# Patient Record
Sex: MALE | Race: OTHER | HISPANIC OR LATINO
[De-identification: names, ages, dates, MRNs, and addresses within clinical notes are randomized per-mention and may not be internally consistent; named-entity substitution may affect disease eponyms.]

---

## 2017-01-23 ENCOUNTER — APPOINTMENT (OUTPATIENT)
Dept: VASCULAR SURGERY | Facility: CLINIC | Age: 82
End: 2017-01-23

## 2017-07-24 ENCOUNTER — APPOINTMENT (OUTPATIENT)
Dept: VASCULAR SURGERY | Facility: CLINIC | Age: 82
End: 2017-07-24

## 2017-07-24 VITALS — DIASTOLIC BLOOD PRESSURE: 60 MMHG | SYSTOLIC BLOOD PRESSURE: 146 MMHG

## 2018-01-03 ENCOUNTER — OUTPATIENT (OUTPATIENT)
Dept: OUTPATIENT SERVICES | Facility: HOSPITAL | Age: 83
LOS: 1 days | End: 2018-01-03
Payer: MEDICARE

## 2018-01-03 DIAGNOSIS — E11.22 TYPE 2 DIABETES MELLITUS WITH DIABETIC CHRONIC KIDNEY DISEASE: ICD-10-CM

## 2018-01-03 DIAGNOSIS — R06.09 OTHER FORMS OF DYSPNEA: ICD-10-CM

## 2018-01-03 PROCEDURE — A9505: CPT

## 2018-01-03 PROCEDURE — 78452 HT MUSCLE IMAGE SPECT MULT: CPT

## 2018-01-03 PROCEDURE — 93017 CV STRESS TEST TRACING ONLY: CPT

## 2018-01-03 PROCEDURE — A9500: CPT

## 2018-01-03 PROCEDURE — 82962 GLUCOSE BLOOD TEST: CPT

## 2018-09-14 ENCOUNTER — APPOINTMENT (OUTPATIENT)
Dept: VASCULAR SURGERY | Facility: CLINIC | Age: 83
End: 2018-09-14
Payer: MEDICARE

## 2018-09-14 PROCEDURE — 99213 OFFICE O/P EST LOW 20 MIN: CPT

## 2018-09-14 PROCEDURE — 93926 LOWER EXTREMITY STUDY: CPT

## 2019-03-15 ENCOUNTER — APPOINTMENT (OUTPATIENT)
Dept: VASCULAR SURGERY | Facility: CLINIC | Age: 84
End: 2019-03-15
Payer: MEDICARE

## 2019-03-15 PROCEDURE — 99213 OFFICE O/P EST LOW 20 MIN: CPT

## 2019-03-15 PROCEDURE — 93926 LOWER EXTREMITY STUDY: CPT

## 2019-03-16 NOTE — HISTORY OF PRESENT ILLNESS
[FreeTextEntry1] : doing well\par no claudication\par no ulcers\par no pain \par he has some swelling in the legs\par no CP or SOb

## 2019-03-16 NOTE — PHYSICAL EXAM
[2+] : left 2+ [Ankle Swelling (On Exam)] : present [Ankle Swelling Bilaterally] : bilaterally  [Varicose Veins Of Lower Extremities] : not present [] : not present [de-identified] : pleasant [FreeTextEntry1] : feet warm and pink

## 2019-09-13 ENCOUNTER — APPOINTMENT (OUTPATIENT)
Dept: VASCULAR SURGERY | Facility: CLINIC | Age: 84
End: 2019-09-13
Payer: MEDICARE

## 2019-09-13 PROCEDURE — 93926 LOWER EXTREMITY STUDY: CPT

## 2019-09-13 PROCEDURE — 99213 OFFICE O/P EST LOW 20 MIN: CPT

## 2019-09-18 NOTE — HISTORY OF PRESENT ILLNESS
[FreeTextEntry1] : 89 year old male  with hx of claudication, s/p Right Angio/ SFA stent on 8/13/15 presents for f/u. Patient states that he can walk without pain, but he walks slowly w/ a cane. Denies any claudication symptoms, no rest pain, no skin breakdown.

## 2019-09-18 NOTE — ASSESSMENT
[Arterial/Venous Disease] : arterial/venous disease [FreeTextEntry1] : 89 year old male  with hx of claudication, s/p Right Angio/ SFA stent on 8/13/15 presents for f/u.\par Patient is doing well, denies claudication, walks with a cane.\par R Arterial duplex is stable.\par F/u in 6 months.

## 2019-09-18 NOTE — REVIEW OF SYSTEMS
[Negative] : Heme/Lymph [Leg Claudication] : no intermittent leg claudication [Lower Ext Edema] : no extremity edema [Limb Pain] : no limb pain [Limb Swelling] : no limb swelling [Difficulty Walking] : no difficulty walking [Limb Weakness] : no limb weakness

## 2019-09-18 NOTE — PROCEDURE
[FreeTextEntry1] : R arterial doppler revealed patent SFA stent, pop, peronea-tibial trunk are occluded, AT occluded and reconstitutes distally

## 2019-09-18 NOTE — PHYSICAL EXAM
[Normal Heart Sounds] : normal heart sounds [Normal Breath Sounds] : Normal breath sounds [2+] : right 2+ [1+] : left 1+ [Ankle Swelling Bilaterally] : bilaterally  [Ankle Swelling (On Exam)] : present [JVD] : no jugular venous distention  [Varicose Veins Of Lower Extremities] : not present [] : not present [de-identified] : WN/WD, NAD [de-identified] : NC/AT [FreeTextEntry1] : feet warm and pink

## 2019-12-11 ENCOUNTER — EMERGENCY (EMERGENCY)
Facility: HOSPITAL | Age: 84
LOS: 1 days | Discharge: HOME | End: 2019-12-11
Attending: EMERGENCY MEDICINE | Admitting: EMERGENCY MEDICINE
Payer: MEDICARE

## 2019-12-11 VITALS
OXYGEN SATURATION: 95 % | SYSTOLIC BLOOD PRESSURE: 114 MMHG | TEMPERATURE: 98 F | HEART RATE: 103 BPM | RESPIRATION RATE: 18 BRPM | DIASTOLIC BLOOD PRESSURE: 63 MMHG

## 2019-12-11 VITALS
HEART RATE: 82 BPM | DIASTOLIC BLOOD PRESSURE: 72 MMHG | TEMPERATURE: 98 F | SYSTOLIC BLOOD PRESSURE: 155 MMHG | OXYGEN SATURATION: 95 % | RESPIRATION RATE: 18 BRPM

## 2019-12-11 DIAGNOSIS — Z79.899 OTHER LONG TERM (CURRENT) DRUG THERAPY: ICD-10-CM

## 2019-12-11 DIAGNOSIS — R06.02 SHORTNESS OF BREATH: ICD-10-CM

## 2019-12-11 DIAGNOSIS — Z79.82 LONG TERM (CURRENT) USE OF ASPIRIN: ICD-10-CM

## 2019-12-11 DIAGNOSIS — E78.5 HYPERLIPIDEMIA, UNSPECIFIED: ICD-10-CM

## 2019-12-11 DIAGNOSIS — J18.9 PNEUMONIA, UNSPECIFIED ORGANISM: ICD-10-CM

## 2019-12-11 LAB
ALBUMIN SERPL ELPH-MCNC: 3.9 G/DL — SIGNIFICANT CHANGE UP (ref 3.3–5)
ALP SERPL-CCNC: 96 U/L — SIGNIFICANT CHANGE UP (ref 40–120)
ALT FLD-CCNC: 14 U/L — SIGNIFICANT CHANGE UP (ref 10–45)
ANION GAP SERPL CALC-SCNC: 12 MMOL/L — SIGNIFICANT CHANGE UP (ref 5–17)
APTT BLD: 32.1 SEC — SIGNIFICANT CHANGE UP (ref 27.5–36.3)
AST SERPL-CCNC: 21 U/L — SIGNIFICANT CHANGE UP (ref 10–40)
BASE EXCESS BLDV CALC-SCNC: -4.3 MMOL/L — SIGNIFICANT CHANGE UP
BASOPHILS # BLD AUTO: 0.03 K/UL — SIGNIFICANT CHANGE UP (ref 0–0.2)
BASOPHILS NFR BLD AUTO: 0.5 % — SIGNIFICANT CHANGE UP (ref 0–2)
BILIRUB SERPL-MCNC: 0.3 MG/DL — SIGNIFICANT CHANGE UP (ref 0.2–1.2)
BUN SERPL-MCNC: 35 MG/DL — HIGH (ref 7–23)
CALCIUM SERPL-MCNC: 9 MG/DL — SIGNIFICANT CHANGE UP (ref 8.4–10.5)
CHLORIDE SERPL-SCNC: 107 MMOL/L — SIGNIFICANT CHANGE UP (ref 96–108)
CK MB CFR SERPL CALC: 1.5 NG/ML — SIGNIFICANT CHANGE UP (ref 0–6.7)
CO2 SERPL-SCNC: 20 MMOL/L — LOW (ref 22–31)
CREAT SERPL-MCNC: 1.66 MG/DL — HIGH (ref 0.5–1.3)
EOSINOPHIL # BLD AUTO: 0.08 K/UL — SIGNIFICANT CHANGE UP (ref 0–0.5)
EOSINOPHIL NFR BLD AUTO: 1.4 % — SIGNIFICANT CHANGE UP (ref 0–6)
GAS PNL BLDV: SIGNIFICANT CHANGE UP
GLUCOSE SERPL-MCNC: 151 MG/DL — HIGH (ref 70–99)
HCO3 BLDV-SCNC: 20 MMOL/L — SIGNIFICANT CHANGE UP (ref 20–27)
HCT VFR BLD CALC: 42.9 % — SIGNIFICANT CHANGE UP (ref 39–50)
HGB BLD-MCNC: 13.3 G/DL — SIGNIFICANT CHANGE UP (ref 13–17)
IMM GRANULOCYTES NFR BLD AUTO: 0.4 % — SIGNIFICANT CHANGE UP (ref 0–1.5)
INR BLD: 0.99 — SIGNIFICANT CHANGE UP (ref 0.88–1.16)
LACTATE SERPL-SCNC: 1.1 MMOL/L — SIGNIFICANT CHANGE UP (ref 0.5–2)
LYMPHOCYTES # BLD AUTO: 1.19 K/UL — SIGNIFICANT CHANGE UP (ref 1–3.3)
LYMPHOCYTES # BLD AUTO: 21.3 % — SIGNIFICANT CHANGE UP (ref 13–44)
MCHC RBC-ENTMCNC: 30.4 PG — SIGNIFICANT CHANGE UP (ref 27–34)
MCHC RBC-ENTMCNC: 31 GM/DL — LOW (ref 32–36)
MCV RBC AUTO: 98.2 FL — SIGNIFICANT CHANGE UP (ref 80–100)
MONOCYTES # BLD AUTO: 0.52 K/UL — SIGNIFICANT CHANGE UP (ref 0–0.9)
MONOCYTES NFR BLD AUTO: 9.3 % — SIGNIFICANT CHANGE UP (ref 2–14)
NEUTROPHILS # BLD AUTO: 3.76 K/UL — SIGNIFICANT CHANGE UP (ref 1.8–7.4)
NEUTROPHILS NFR BLD AUTO: 67.1 % — SIGNIFICANT CHANGE UP (ref 43–77)
NRBC # BLD: 0 /100 WBCS — SIGNIFICANT CHANGE UP (ref 0–0)
NT-PROBNP SERPL-SCNC: 170 PG/ML — SIGNIFICANT CHANGE UP (ref 0–300)
PCO2 BLDV: 35 MMHG — LOW (ref 41–51)
PH BLDV: 7.38 — SIGNIFICANT CHANGE UP (ref 7.32–7.43)
PLATELET # BLD AUTO: 197 K/UL — SIGNIFICANT CHANGE UP (ref 150–400)
PO2 BLDV: 122 MMHG — SIGNIFICANT CHANGE UP
POTASSIUM SERPL-MCNC: 4.7 MMOL/L — SIGNIFICANT CHANGE UP (ref 3.5–5.3)
POTASSIUM SERPL-SCNC: 4.7 MMOL/L — SIGNIFICANT CHANGE UP (ref 3.5–5.3)
PROT SERPL-MCNC: 7.1 G/DL — SIGNIFICANT CHANGE UP (ref 6–8.3)
PROTHROM AB SERPL-ACNC: 11.2 SEC — SIGNIFICANT CHANGE UP (ref 10–12.9)
RAPID RVP RESULT: SIGNIFICANT CHANGE UP
RBC # BLD: 4.37 M/UL — SIGNIFICANT CHANGE UP (ref 4.2–5.8)
RBC # FLD: 13.4 % — SIGNIFICANT CHANGE UP (ref 10.3–14.5)
SAO2 % BLDV: 98 % — SIGNIFICANT CHANGE UP
SODIUM SERPL-SCNC: 139 MMOL/L — SIGNIFICANT CHANGE UP (ref 135–145)
TROPONIN T SERPL-MCNC: <0.01 NG/ML — SIGNIFICANT CHANGE UP (ref 0–0.01)
WBC # BLD: 5.6 K/UL — SIGNIFICANT CHANGE UP (ref 3.8–10.5)
WBC # FLD AUTO: 5.6 K/UL — SIGNIFICANT CHANGE UP (ref 3.8–10.5)

## 2019-12-11 PROCEDURE — 82553 CREATINE MB FRACTION: CPT

## 2019-12-11 PROCEDURE — 83880 ASSAY OF NATRIURETIC PEPTIDE: CPT

## 2019-12-11 PROCEDURE — 85730 THROMBOPLASTIN TIME PARTIAL: CPT

## 2019-12-11 PROCEDURE — 99283 EMERGENCY DEPT VISIT LOW MDM: CPT | Mod: 25

## 2019-12-11 PROCEDURE — 82803 BLOOD GASES ANY COMBINATION: CPT

## 2019-12-11 PROCEDURE — 87486 CHLMYD PNEUM DNA AMP PROBE: CPT

## 2019-12-11 PROCEDURE — 82550 ASSAY OF CK (CPK): CPT

## 2019-12-11 PROCEDURE — 83605 ASSAY OF LACTIC ACID: CPT

## 2019-12-11 PROCEDURE — 84484 ASSAY OF TROPONIN QUANT: CPT

## 2019-12-11 PROCEDURE — 80053 COMPREHEN METABOLIC PANEL: CPT

## 2019-12-11 PROCEDURE — 93005 ELECTROCARDIOGRAM TRACING: CPT

## 2019-12-11 PROCEDURE — 87040 BLOOD CULTURE FOR BACTERIA: CPT

## 2019-12-11 PROCEDURE — 99285 EMERGENCY DEPT VISIT HI MDM: CPT

## 2019-12-11 PROCEDURE — 96360 HYDRATION IV INFUSION INIT: CPT

## 2019-12-11 PROCEDURE — 93010 ELECTROCARDIOGRAM REPORT: CPT

## 2019-12-11 PROCEDURE — 71046 X-RAY EXAM CHEST 2 VIEWS: CPT

## 2019-12-11 PROCEDURE — 36415 COLL VENOUS BLD VENIPUNCTURE: CPT

## 2019-12-11 PROCEDURE — 87581 M.PNEUMON DNA AMP PROBE: CPT

## 2019-12-11 PROCEDURE — 85610 PROTHROMBIN TIME: CPT

## 2019-12-11 PROCEDURE — 94640 AIRWAY INHALATION TREATMENT: CPT

## 2019-12-11 PROCEDURE — 71046 X-RAY EXAM CHEST 2 VIEWS: CPT | Mod: 26

## 2019-12-11 PROCEDURE — 87633 RESP VIRUS 12-25 TARGETS: CPT

## 2019-12-11 PROCEDURE — 85025 COMPLETE CBC W/AUTO DIFF WBC: CPT

## 2019-12-11 PROCEDURE — 87798 DETECT AGENT NOS DNA AMP: CPT

## 2019-12-11 RX ORDER — IPRATROPIUM/ALBUTEROL SULFATE 18-103MCG
3 AEROSOL WITH ADAPTER (GRAM) INHALATION ONCE
Refills: 0 | Status: COMPLETED | OUTPATIENT
Start: 2019-12-11 | End: 2019-12-11

## 2019-12-11 RX ORDER — SODIUM CHLORIDE 9 MG/ML
500 INJECTION INTRAMUSCULAR; INTRAVENOUS; SUBCUTANEOUS ONCE
Refills: 0 | Status: COMPLETED | OUTPATIENT
Start: 2019-12-11 | End: 2019-12-11

## 2019-12-11 RX ADMIN — SODIUM CHLORIDE 500 MILLILITER(S): 9 INJECTION INTRAMUSCULAR; INTRAVENOUS; SUBCUTANEOUS at 09:18

## 2019-12-11 RX ADMIN — Medication 100 MILLIGRAM(S): at 10:42

## 2019-12-11 RX ADMIN — Medication 3 MILLILITER(S): at 07:17

## 2019-12-11 RX ADMIN — SODIUM CHLORIDE 500 MILLILITER(S): 9 INJECTION INTRAMUSCULAR; INTRAVENOUS; SUBCUTANEOUS at 07:57

## 2019-12-11 NOTE — ED PROVIDER NOTE - PATIENT PORTAL LINK FT
You can access the FollowMyHealth Patient Portal offered by [facility] by registering at the following website: [email or web address]. By joining [email or web address]’s FollowMyHealth portal, you will also be able to view your health information using other applications (apps) compatible with our system.

## 2019-12-11 NOTE — ED PROVIDER NOTE - CLINICAL SUMMARY MEDICAL DECISION MAKING FREE TEXT BOX
89M PMH DM, HLD p/w SOB "for a long time." Unclear what finally prompted ED visit today. Also w/ cough, and feeling like "I have the flu." No other systemic symptoms. Mild tachycardia, other vitals wnl. Exam as above. Well appearing.  ddx: Likely infectious. Less likely cardiac.  cbc, cmp, trop, bnp, CXR, trial neb, rvp, reassess.

## 2019-12-11 NOTE — ED PROVIDER NOTE - NSFOLLOWUPINSTRUCTIONS_ED_ALL_ED_FT
Can take tylenol 650mg every 6hrs as needed for pain or mild fever.  Stay well hydrated.  Take antibiotics as prescribed.  Return for fevers, persistent vomit, uncontrolled pain, worsening breathing, worsening lightheaded.  Follow up with primary doctor within 1-2 days.     Shortness of breath    Shortness of breath (dyspnea) means you have trouble breathing and could indicate a medical problem. Causes include lung disease, heart disease, low amount of red blood cells (anemia), poor physical fitness, being overweight, smoking, etc. Your health care provider today may not be able to find a cause for your shortness of breath after your exam. In this case, it is important to have a follow-up exam with your primary care physician as instructed. If medicines were prescribed, take them as directed for the full length of time directed. Refrain from tobacco products.    SEEK IMMEDIATE MEDICAL CARE IF YOU HAVE ANY OF THE FOLLOWING SYMPTOMS: worsening shortness of breath, chest pain, back pain, abdominal pain, fever, coughing up blood, lightheadedness/dizziness.     Pneumonia    Pneumonia is an infection of the lungs. Pneumonia may be caused by bacteria, viruses, or funguses. Symptoms include coughing, fever, chest pain when breathing deeply or coughing, shortness of breath, fatigue, or muscle aches. Pneumonia can be diagnosed with a medical history and physical exam, as well as other tests which may include a chest X-ray. If you were prescribed an antibiotic medicine, take it as told by your health care provider and do not stop taking the antibiotic even if you start to feel better. Do not use tobacco products, including cigarettes, chewing tobacco, and e-cigarettes.    SEEK IMMEDIATE MEDICAL CARE IF YOU HAVE ANY OF THE FOLLOWING SYMPTOMS: worsening shortness of breath, worsening chest pain, coughing up blood, change in mental status, lightheadedness/dizziness.

## 2019-12-11 NOTE — ED PROVIDER NOTE - PROGRESS NOTE DETAILS
[place]: CR 1.6, unknown baseline, other labs grossly wnl. rvp negative. CXR w/ likely RLL infiltrate. Pt ambulating in ER w/ cane w/o any SOB.  [id number] for discahrge instrucitons. given age, offered amdmission, pt prefers to go home w/ oral abx, outpt pmd f/u.

## 2019-12-11 NOTE — ED ADULT NURSE NOTE - NSIMPLEMENTINTERV_GEN_ALL_ED
Implemented All Fall with Harm Risk Interventions:  [place] to call system. Call bell, personal items and telephone within reach. Instruct patient to call for assistance. Room bathroom lighting operational. Non-slip footwear when patient is off stretcher. Physically safe environment: no spills, clutter or unnecessary equipment. Stretcher in lowest position, wheels locked, appropriate side rails in place. Provide visual cue, wrist band, yellow gown, etc. Monitor gait and stability. Monitor for mental status changes and reorient to person, place, and time. Review medications for side effects contributing to fall risk. Reinforce activity limits and safety measures with patient and family. Provide visual clues: red socks.

## 2019-12-11 NOTE — ED PROVIDER NOTE - PHYSICAL EXAMINATION
resp: good air entry b/l, minimal scattered rhonchi/wheeze.  no LE edema, normal equal distal pulses.

## 2019-12-11 NOTE — ED PROVIDER NOTE - OBJECTIVE STATEMENT
asa, januvia, pravastatin, glipizide, flomax, amlodipine, palvix, lisinopril  [id number]  89M PMH DM, HLD p/w SOB "for a long time." Unclear what finally prompted ED visit today. Also w/ cough, and feeling like "I have the flu." Denies cp, f/c, NVD, abd pain, urinary complaints, black/bloody stool, focal weakness/numbness, LE pain/swelling, black/bloody stool. Normal PO intake, normal BMs.   Last cardiac testing ~2yrs ago wnl.   meds: asa, januvia, pravastatin, glipizide, flomax, amlodipine, palvix, lisinopril

## 2019-12-16 LAB
CULTURE RESULTS: SIGNIFICANT CHANGE UP
CULTURE RESULTS: SIGNIFICANT CHANGE UP
SPECIMEN SOURCE: SIGNIFICANT CHANGE UP
SPECIMEN SOURCE: SIGNIFICANT CHANGE UP

## 2020-03-13 ENCOUNTER — APPOINTMENT (OUTPATIENT)
Dept: VASCULAR SURGERY | Facility: CLINIC | Age: 85
End: 2020-03-13

## 2020-06-08 ENCOUNTER — APPOINTMENT (OUTPATIENT)
Dept: VASCULAR SURGERY | Facility: CLINIC | Age: 85
End: 2020-06-08
Payer: MEDICARE

## 2020-06-08 DIAGNOSIS — Z01.818 ENCOUNTER FOR OTHER PREPROCEDURAL EXAMINATION: ICD-10-CM

## 2020-06-08 PROCEDURE — 99214 OFFICE O/P EST MOD 30 MIN: CPT

## 2020-06-08 PROCEDURE — 93923 UPR/LXTR ART STDY 3+ LVLS: CPT

## 2020-06-08 NOTE — PHYSICAL EXAM
[Normal Breath Sounds] : Normal breath sounds [Normal Heart Sounds] : normal heart sounds [2+] : left 2+ [0] : right 0 [1+] : left 1+ [Ankle Swelling (On Exam)] : present [Ankle Swelling Bilaterally] : bilaterally  [JVD] : no jugular venous distention  [] : not present [Varicose Veins Of Lower Extremities] : not present [de-identified] : WN/WD, NAD [de-identified] : NC/AT [FreeTextEntry1] : feet warm and pink

## 2020-06-08 NOTE — REVIEW OF SYSTEMS
[As noted in HPI] : as noted in HPI [Leg Claudication] : intermittent leg claudication [Negative] : Heme/Lymph [Lower Ext Edema] : no extremity edema [Limb Pain] : no limb pain [Limb Swelling] : no limb swelling [Limb Weakness] : no limb weakness [Difficulty Walking] : no difficulty walking

## 2020-06-08 NOTE — ASSESSMENT
[Arterial/Venous Disease] : arterial/venous disease [FreeTextEntry1] : 89 year old male  with PAD, s/p Right Angio/ SFA stent on 8/13/15 presents with c/o of worsening claudication.\par JANNET/PVR was performed, demonstrating R JANNET of 0.46.\par We recommend for patient to have RLE angiogram next week.\par He and his wife agreed and will go for pre-op testing.

## 2020-06-08 NOTE — HISTORY OF PRESENT ILLNESS
[FreeTextEntry1] : 89 year old male  with hx of claudication, s/p Right Angio/ SFA stent on 8/13/15 presents for f/u. Patient states that he started to experience more pain in his legs, and he walks slowly w/ a cane. Denies any  rest pain, skin breakdown.

## 2020-06-13 ENCOUNTER — LABORATORY RESULT (OUTPATIENT)
Age: 85
End: 2020-06-13

## 2020-06-13 LAB
ALBUMIN SERPL ELPH-MCNC: 3.6 G/DL
ALP BLD-CCNC: 120 U/L
ALT SERPL-CCNC: 15 U/L
ANION GAP SERPL CALC-SCNC: 14 MMOL/L
AST SERPL-CCNC: 22 U/L
BILIRUB SERPL-MCNC: 0.3 MG/DL
BUN SERPL-MCNC: 31 MG/DL
CALCIUM SERPL-MCNC: 9.3 MG/DL
CHLORIDE SERPL-SCNC: 103 MMOL/L
CO2 SERPL-SCNC: 22 MMOL/L
CREAT SERPL-MCNC: 1.65 MG/DL
GLUCOSE SERPL-MCNC: 279 MG/DL
INR PPP: 1 RATIO
POTASSIUM SERPL-SCNC: 4.9 MMOL/L
PROT SERPL-MCNC: 6.6 G/DL
PT BLD: 11.4 SEC
SODIUM SERPL-SCNC: 139 MMOL/L

## 2020-06-15 ENCOUNTER — TRANSCRIPTION ENCOUNTER (OUTPATIENT)
Age: 85
End: 2020-06-15

## 2020-06-15 VITALS
RESPIRATION RATE: 16 BRPM | TEMPERATURE: 97 F | DIASTOLIC BLOOD PRESSURE: 60 MMHG | HEART RATE: 80 BPM | WEIGHT: 182.32 LBS | OXYGEN SATURATION: 99 % | HEIGHT: 67 IN | SYSTOLIC BLOOD PRESSURE: 144 MMHG

## 2020-06-15 NOTE — ASU PATIENT PROFILE, ADULT - PSH
Presence of stent in artery  femoral artery Cataracts, both eyes    H/O hernia repair    Presence of stent in artery  femoral artery- left

## 2020-06-16 ENCOUNTER — OUTPATIENT (OUTPATIENT)
Dept: OUTPATIENT SERVICES | Facility: HOSPITAL | Age: 85
LOS: 1 days | Discharge: HOME | End: 2020-06-16
Payer: MEDICARE

## 2020-06-16 ENCOUNTER — APPOINTMENT (OUTPATIENT)
Dept: VASCULAR SURGERY | Facility: HOSPITAL | Age: 85
End: 2020-06-16

## 2020-06-16 VITALS
SYSTOLIC BLOOD PRESSURE: 163 MMHG | DIASTOLIC BLOOD PRESSURE: 72 MMHG | OXYGEN SATURATION: 100 % | RESPIRATION RATE: 13 BRPM | HEART RATE: 55 BPM

## 2020-06-16 DIAGNOSIS — Z98.890 OTHER SPECIFIED POSTPROCEDURAL STATES: Chronic | ICD-10-CM

## 2020-06-16 DIAGNOSIS — Z95.828 PRESENCE OF OTHER VASCULAR IMPLANTS AND GRAFTS: Chronic | ICD-10-CM

## 2020-06-16 DIAGNOSIS — H26.9 UNSPECIFIED CATARACT: Chronic | ICD-10-CM

## 2020-06-16 LAB
GLUCOSE BLDC GLUCOMTR-MCNC: 220 MG/DL — HIGH (ref 70–99)
GLUCOSE BLDC GLUCOMTR-MCNC: 239 MG/DL — HIGH (ref 70–99)

## 2020-06-16 PROCEDURE — C1894: CPT

## 2020-06-16 PROCEDURE — 76000 FLUOROSCOPY <1 HR PHYS/QHP: CPT

## 2020-06-16 PROCEDURE — 75710 ARTERY X-RAYS ARM/LEG: CPT | Mod: 26,GC

## 2020-06-16 PROCEDURE — C1769: CPT

## 2020-06-16 PROCEDURE — 82962 GLUCOSE BLOOD TEST: CPT

## 2020-06-16 PROCEDURE — 36246 INS CATH ABD/L-EXT ART 2ND: CPT | Mod: RT

## 2020-06-16 PROCEDURE — 36246 INS CATH ABD/L-EXT ART 2ND: CPT | Mod: GC

## 2020-06-16 PROCEDURE — 75625 CONTRAST EXAM ABDOMINL AORTA: CPT | Mod: 26,GC

## 2020-06-16 RX ORDER — SODIUM CHLORIDE 9 MG/ML
1000 INJECTION INTRAMUSCULAR; INTRAVENOUS; SUBCUTANEOUS
Refills: 0 | Status: DISCONTINUED | OUTPATIENT
Start: 2020-06-16 | End: 2020-06-16

## 2020-06-16 RX ORDER — ACETAMINOPHEN 500 MG
650 TABLET ORAL EVERY 6 HOURS
Refills: 0 | Status: DISCONTINUED | OUTPATIENT
Start: 2020-06-16 | End: 2020-06-16

## 2020-06-16 NOTE — BRIEF OPERATIVE NOTE - OPERATION/FINDINGS
RLE angio shows patent iliacs, patent CFA/PFA/SFA, patent pop above the knee, but below the knee, pop is occluded with collaterals that reconstitute PT to the ankle. Peroneal and AT are occluded. Diagnostic only, no intervention.

## 2020-06-16 NOTE — BRIEF OPERATIVE NOTE - NSICDXBRIEFPOSTOP_GEN_ALL_CORE_FT
POST-OP DIAGNOSIS:  PAD (peripheral artery disease) 16-Jun-2020 12:11:25 with claudication Geoff Reis

## 2020-06-16 NOTE — BRIEF OPERATIVE NOTE - NSICDXBRIEFPREOP_GEN_ALL_CORE_FT
PRE-OP DIAGNOSIS:  PAD (peripheral artery disease) 16-Jun-2020 12:11:15 with claudication Geoff Reis

## 2020-07-01 PROBLEM — I73.9 PERIPHERAL VASCULAR DISEASE, UNSPECIFIED: Chronic | Status: ACTIVE | Noted: 2020-06-15

## 2020-07-01 PROBLEM — E78.5 HYPERLIPIDEMIA, UNSPECIFIED: Chronic | Status: ACTIVE | Noted: 2020-06-15

## 2020-07-01 PROBLEM — E11.9 TYPE 2 DIABETES MELLITUS WITHOUT COMPLICATIONS: Chronic | Status: ACTIVE | Noted: 2020-06-15

## 2020-07-01 PROBLEM — I10 ESSENTIAL (PRIMARY) HYPERTENSION: Chronic | Status: ACTIVE | Noted: 2020-06-15

## 2020-08-23 ENCOUNTER — EMERGENCY (EMERGENCY)
Facility: HOSPITAL | Age: 85
LOS: 1 days | Discharge: HOME | End: 2020-08-23
Attending: EMERGENCY MEDICINE | Admitting: EMERGENCY MEDICINE
Payer: MEDICARE

## 2020-08-23 VITALS
TEMPERATURE: 99 F | HEART RATE: 98 BPM | RESPIRATION RATE: 20 BRPM | OXYGEN SATURATION: 97 % | DIASTOLIC BLOOD PRESSURE: 60 MMHG | SYSTOLIC BLOOD PRESSURE: 142 MMHG

## 2020-08-23 VITALS
HEIGHT: 67 IN | WEIGHT: 199.96 LBS | SYSTOLIC BLOOD PRESSURE: 120 MMHG | TEMPERATURE: 98 F | HEART RATE: 89 BPM | RESPIRATION RATE: 18 BRPM | OXYGEN SATURATION: 100 % | DIASTOLIC BLOOD PRESSURE: 68 MMHG

## 2020-08-23 DIAGNOSIS — H26.9 UNSPECIFIED CATARACT: Chronic | ICD-10-CM

## 2020-08-23 DIAGNOSIS — Z95.828 PRESENCE OF OTHER VASCULAR IMPLANTS AND GRAFTS: Chronic | ICD-10-CM

## 2020-08-23 DIAGNOSIS — Z98.890 OTHER SPECIFIED POSTPROCEDURAL STATES: Chronic | ICD-10-CM

## 2020-08-23 LAB
ALBUMIN SERPL ELPH-MCNC: 3.8 G/DL — SIGNIFICANT CHANGE UP (ref 3.3–5)
ALP SERPL-CCNC: 85 U/L — SIGNIFICANT CHANGE UP (ref 40–120)
ALT FLD-CCNC: 11 U/L — SIGNIFICANT CHANGE UP (ref 10–45)
ANION GAP SERPL CALC-SCNC: 13 MMOL/L — SIGNIFICANT CHANGE UP (ref 5–17)
APPEARANCE UR: CLEAR — SIGNIFICANT CHANGE UP
AST SERPL-CCNC: 15 U/L — SIGNIFICANT CHANGE UP (ref 10–40)
BASOPHILS # BLD AUTO: 0.02 K/UL — SIGNIFICANT CHANGE UP (ref 0–0.2)
BASOPHILS NFR BLD AUTO: 0.2 % — SIGNIFICANT CHANGE UP (ref 0–2)
BILIRUB SERPL-MCNC: 0.3 MG/DL — SIGNIFICANT CHANGE UP (ref 0.2–1.2)
BILIRUB UR-MCNC: NEGATIVE — SIGNIFICANT CHANGE UP
BUN SERPL-MCNC: 36 MG/DL — HIGH (ref 7–23)
CALCIUM SERPL-MCNC: 9 MG/DL — SIGNIFICANT CHANGE UP (ref 8.4–10.5)
CHLORIDE SERPL-SCNC: 104 MMOL/L — SIGNIFICANT CHANGE UP (ref 96–108)
CO2 SERPL-SCNC: 21 MMOL/L — LOW (ref 22–31)
COLOR SPEC: YELLOW — SIGNIFICANT CHANGE UP
CREAT SERPL-MCNC: 1.6 MG/DL — HIGH (ref 0.5–1.3)
DIFF PNL FLD: NEGATIVE — SIGNIFICANT CHANGE UP
EOSINOPHIL # BLD AUTO: 0.01 K/UL — SIGNIFICANT CHANGE UP (ref 0–0.5)
EOSINOPHIL NFR BLD AUTO: 0.1 % — SIGNIFICANT CHANGE UP (ref 0–6)
GLUCOSE SERPL-MCNC: 257 MG/DL — HIGH (ref 70–99)
GLUCOSE UR QL: >=1000
HCT VFR BLD CALC: 34.5 % — LOW (ref 39–50)
HGB BLD-MCNC: 10.8 G/DL — LOW (ref 13–17)
IMM GRANULOCYTES NFR BLD AUTO: 0.6 % — SIGNIFICANT CHANGE UP (ref 0–1.5)
KETONES UR-MCNC: ABNORMAL MG/DL
LACTATE SERPL-SCNC: 2 MMOL/L — SIGNIFICANT CHANGE UP (ref 0.5–2)
LEUKOCYTE ESTERASE UR-ACNC: NEGATIVE — SIGNIFICANT CHANGE UP
LIDOCAIN IGE QN: 48 U/L — SIGNIFICANT CHANGE UP (ref 7–60)
LYMPHOCYTES # BLD AUTO: 0.82 K/UL — LOW (ref 1–3.3)
LYMPHOCYTES # BLD AUTO: 9.1 % — LOW (ref 13–44)
MCHC RBC-ENTMCNC: 30.1 PG — SIGNIFICANT CHANGE UP (ref 27–34)
MCHC RBC-ENTMCNC: 31.3 GM/DL — LOW (ref 32–36)
MCV RBC AUTO: 96.1 FL — SIGNIFICANT CHANGE UP (ref 80–100)
MONOCYTES # BLD AUTO: 0.41 K/UL — SIGNIFICANT CHANGE UP (ref 0–0.9)
MONOCYTES NFR BLD AUTO: 4.6 % — SIGNIFICANT CHANGE UP (ref 2–14)
NEUTROPHILS # BLD AUTO: 7.69 K/UL — HIGH (ref 1.8–7.4)
NEUTROPHILS NFR BLD AUTO: 85.4 % — HIGH (ref 43–77)
NITRITE UR-MCNC: NEGATIVE — SIGNIFICANT CHANGE UP
NRBC # BLD: 0 /100 WBCS — SIGNIFICANT CHANGE UP (ref 0–0)
PH UR: 6 — SIGNIFICANT CHANGE UP (ref 5–8)
PLATELET # BLD AUTO: 217 K/UL — SIGNIFICANT CHANGE UP (ref 150–400)
POTASSIUM SERPL-MCNC: 4.6 MMOL/L — SIGNIFICANT CHANGE UP (ref 3.5–5.3)
POTASSIUM SERPL-SCNC: 4.6 MMOL/L — SIGNIFICANT CHANGE UP (ref 3.5–5.3)
PROT SERPL-MCNC: 7.1 G/DL — SIGNIFICANT CHANGE UP (ref 6–8.3)
PROT UR-MCNC: NEGATIVE MG/DL — SIGNIFICANT CHANGE UP
RBC # BLD: 3.59 M/UL — LOW (ref 4.2–5.8)
RBC # FLD: 13.7 % — SIGNIFICANT CHANGE UP (ref 10.3–14.5)
SODIUM SERPL-SCNC: 138 MMOL/L — SIGNIFICANT CHANGE UP (ref 135–145)
SP GR SPEC: 1.02 — SIGNIFICANT CHANGE UP (ref 1–1.03)
UROBILINOGEN FLD QL: 0.2 E.U./DL — SIGNIFICANT CHANGE UP
WBC # BLD: 9 K/UL — SIGNIFICANT CHANGE UP (ref 3.8–10.5)
WBC # FLD AUTO: 9 K/UL — SIGNIFICANT CHANGE UP (ref 3.8–10.5)

## 2020-08-23 PROCEDURE — 74177 CT ABD & PELVIS W/CONTRAST: CPT | Mod: 26

## 2020-08-23 PROCEDURE — 71045 X-RAY EXAM CHEST 1 VIEW: CPT

## 2020-08-23 PROCEDURE — 83690 ASSAY OF LIPASE: CPT

## 2020-08-23 PROCEDURE — 99284 EMERGENCY DEPT VISIT MOD MDM: CPT | Mod: 25

## 2020-08-23 PROCEDURE — 96374 THER/PROPH/DIAG INJ IV PUSH: CPT | Mod: XU

## 2020-08-23 PROCEDURE — 71045 X-RAY EXAM CHEST 1 VIEW: CPT | Mod: 26

## 2020-08-23 PROCEDURE — 83605 ASSAY OF LACTIC ACID: CPT

## 2020-08-23 PROCEDURE — 80053 COMPREHEN METABOLIC PANEL: CPT

## 2020-08-23 PROCEDURE — 82962 GLUCOSE BLOOD TEST: CPT

## 2020-08-23 PROCEDURE — 96361 HYDRATE IV INFUSION ADD-ON: CPT

## 2020-08-23 PROCEDURE — 74177 CT ABD & PELVIS W/CONTRAST: CPT

## 2020-08-23 PROCEDURE — 99285 EMERGENCY DEPT VISIT HI MDM: CPT

## 2020-08-23 PROCEDURE — 87086 URINE CULTURE/COLONY COUNT: CPT

## 2020-08-23 PROCEDURE — 85025 COMPLETE CBC W/AUTO DIFF WBC: CPT

## 2020-08-23 PROCEDURE — 81003 URINALYSIS AUTO W/O SCOPE: CPT

## 2020-08-23 PROCEDURE — 36415 COLL VENOUS BLD VENIPUNCTURE: CPT

## 2020-08-23 RX ORDER — SODIUM CHLORIDE 9 MG/ML
1000 INJECTION INTRAMUSCULAR; INTRAVENOUS; SUBCUTANEOUS ONCE
Refills: 0 | Status: COMPLETED | OUTPATIENT
Start: 2020-08-23 | End: 2020-08-23

## 2020-08-23 RX ORDER — IOHEXOL 300 MG/ML
30 INJECTION, SOLUTION INTRAVENOUS ONCE
Refills: 0 | Status: COMPLETED | OUTPATIENT
Start: 2020-08-23 | End: 2020-08-23

## 2020-08-23 RX ORDER — ONDANSETRON 8 MG/1
4 TABLET, FILM COATED ORAL ONCE
Refills: 0 | Status: COMPLETED | OUTPATIENT
Start: 2020-08-23 | End: 2020-08-23

## 2020-08-23 RX ADMIN — ONDANSETRON 4 MILLIGRAM(S): 8 TABLET, FILM COATED ORAL at 17:58

## 2020-08-23 RX ADMIN — SODIUM CHLORIDE 1000 MILLILITER(S): 9 INJECTION INTRAMUSCULAR; INTRAVENOUS; SUBCUTANEOUS at 20:48

## 2020-08-23 RX ADMIN — SODIUM CHLORIDE 1000 MILLILITER(S): 9 INJECTION INTRAMUSCULAR; INTRAVENOUS; SUBCUTANEOUS at 19:48

## 2020-08-23 RX ADMIN — IOHEXOL 30 MILLILITER(S): 300 INJECTION, SOLUTION INTRAVENOUS at 17:58

## 2020-08-23 NOTE — ED ADULT NURSE NOTE - OBJECTIVE STATEMENT
89 y/o male w/ hx of hernia repair several years ago and vascular surgery x 2 months ago presents to the ED c/o generalized abdominal pain that began 1 day ago associated w/ nausea and 1 episode of vomiting. Denies fever, chills, black or bloody stool, cp, SOB, dysuria, hematuria, and any other associate sx.

## 2020-08-23 NOTE — ED ADULT NURSE NOTE - NSIMPLEMENTINTERV_GEN_ALL_ED
Implemented All Fall with Harm Risk Interventions:  [place] to call system. Call bell, personal items and telephone within reach. Instruct patient to call for assistance. Room bathroom lighting operational. Non-slip footwear when patient is off stretcher. Physically safe environment: no spills, clutter or unnecessary equipment. Stretcher in lowest position, wheels locked, appropriate side rails in place. Provide visual cue, wrist band, yellow gown, etc. Monitor gait and stability. Monitor for mental status changes and reorient to person, place, and time. Review medications for side effects contributing to fall risk. Reinforce activity limits and safety measures with patient and family. Provide visual clues: red socks.

## 2020-08-23 NOTE — ED PROVIDER NOTE - OBJECTIVE STATEMENT
89 M co abd pain- hx of hernia repair many years ago, vascular surgery 2 months ago- co abd pain  sharp gen abd pain x1 day w nausea, vomit x 1- nb,nb- nml bm earlier today  no black stools/bloody stools  no cp no sob  no sig assoc sx  moderate severity  no exac/allev factors

## 2020-08-23 NOTE — ED PROVIDER NOTE - CLINICAL SUMMARY MEDICAL DECISION MAKING FREE TEXT BOX
nonspecific abd pain- in elderly male nonspecific abd pain- in elderly male- hx of pvd, dm, hernia repair- check labs CT abd/Pelvis

## 2020-08-23 NOTE — ED PROVIDER NOTE - PATIENT PORTAL LINK FT
You can access the FollowMyHealth Patient Portal offered by [facility] by registering at the following website: [email or web address]. By joining [employer]’s FollowMyHealth portal, you will also be able to view your health information using other applications (apps) compatible with our system.

## 2020-08-23 NOTE — ED PROVIDER NOTE - NSFOLLOWUPINSTRUCTIONS_ED_ALL_ED_FT
Dolor abdominal en los adultos  Abdominal Pain, Adult  El dolor en el abdomen (dolor abdominal) puede tener muchas causas. A menudo, el dolor abdominal no es grave y mejora sin tratamiento o con tratamiento en la casa. Sin embargo, a veces el dolor abdominal es grave.  El médico le hará preguntas sobre maris antecedentes médicos y le hará un examen físico para tratar de determinar la causa del dolor abdominal.  Siga estas instrucciones en wu casa:     Medicamentos     Use los medicamentos de venta manoj y los recetados solamente terell se lo haya indicado el médico.No tome un laxante a menos que se lo haya indicado el médico.Instrucciones generales     Controle wu afección para detectar cualquier cambio.Rachel suficiente líquido terell para mantener la orina de color amarillo pálido.Concurra a todas las visitas de seguimiento terell se lo haya indicado el médico. [place] es importante.Comuníquese con un médico si:  El dolor abdominal cambia o empeora.No tiene apetito o baja de peso sin proponérselo.Está estreñido o tiene diarrea naima más de 2 o 3 días.Tiene dolor cuando orina o defeca.El dolor abdominal lo despierta de noche.El dolor empeora con las comidas, después de comer o con determinados alimentos.Tiene vómitos y no puede retener nada de lo que ingiere.Tiene fiebre.Observa yohannes en la orina.[place] ayuda inmediatamente si:  El dolor no desaparece tan pronto terell el médico le dijo que era esperable.No puede dejar de vomitar.El dolor se siente solo en zonas del abdomen, terell el lado derecho o la parte inferior izquierda del abdomen. Si se localiza en la dov derecha, posiblemente podría tratarse de apendicitis.Las heces son sanguinolentas o de color cheryl, o de aspecto alquitranado.Tiene dolor intenso, cólicos o distensión abdominal.Tiene signos de deshidratación, terell los siguientes:  Orina de color oscuro, muy escasa o falta de orina.Labios agrietados.Sequedad de boca.Ojos hundidos.Somnolencia.Debilidad.Tiene dificultad para respirar o dolor en el pecho.Resumen  A menudo, el dolor abdominal no es grave y mejora sin tratamiento o con tratamiento en la casa. Sin embargo, a veces el dolor abdominal es grave.Controle wu afección para detectar cualquier cambio.Use los medicamentos de venta manoj y los recetados solamente terell se lo haya indicado el médico.Comuníquese con un médico si el dolor abdominal cambia o empeora.Busque ayuda de inmediato si tiene dolor intenso, cólicos o distensión abdominal.Esta información no tiene terell fin reemplazar el consejo del médico. Asegúrese de hacerle al médico cualquier pregunta que tenga.    Document Released: 12/18/2006 Document Revised: 06/24/2020 Document Reviewed: 06/24/2020  Elsevier Patient Education © 2020 [employer]

## 2020-08-23 NOTE — ED PROVIDER NOTE - SHIFT CHANGE DETAILS
89M highly functional c/o vague diffuse abd pain. no systemic sx. no acute surgical abd. labs wnl. ekg w/o acute abnl. reportedly no indication for trop. s/p ct a/p.    dispo: pending ct read, anticipate dc home

## 2020-08-24 LAB
CULTURE RESULTS: NO GROWTH — SIGNIFICANT CHANGE UP
SPECIMEN SOURCE: SIGNIFICANT CHANGE UP

## 2020-08-27 DIAGNOSIS — R10.84 GENERALIZED ABDOMINAL PAIN: ICD-10-CM

## 2020-10-12 VITALS
OXYGEN SATURATION: 100 % | SYSTOLIC BLOOD PRESSURE: 145 MMHG | HEART RATE: 126 BPM | DIASTOLIC BLOOD PRESSURE: 51 MMHG | TEMPERATURE: 103 F | HEIGHT: 67 IN | WEIGHT: 175.05 LBS | RESPIRATION RATE: 20 BRPM

## 2020-10-12 LAB
ALBUMIN SERPL ELPH-MCNC: 3.6 G/DL — SIGNIFICANT CHANGE UP (ref 3.3–5)
ALP SERPL-CCNC: 125 U/L — HIGH (ref 40–120)
ALT FLD-CCNC: 15 U/L — SIGNIFICANT CHANGE UP (ref 10–45)
ANION GAP SERPL CALC-SCNC: 16 MMOL/L — SIGNIFICANT CHANGE UP (ref 5–17)
APPEARANCE UR: CLEAR — SIGNIFICANT CHANGE UP
APTT BLD: 26.6 SEC — LOW (ref 27.5–35.5)
AST SERPL-CCNC: 27 U/L — SIGNIFICANT CHANGE UP (ref 10–40)
BASE EXCESS BLDV CALC-SCNC: -3.1 MMOL/L — SIGNIFICANT CHANGE UP
BASOPHILS # BLD AUTO: 0.04 K/UL — SIGNIFICANT CHANGE UP (ref 0–0.2)
BASOPHILS NFR BLD AUTO: 0.9 % — SIGNIFICANT CHANGE UP (ref 0–2)
BILIRUB SERPL-MCNC: 0.2 MG/DL — SIGNIFICANT CHANGE UP (ref 0.2–1.2)
BILIRUB UR-MCNC: NEGATIVE — SIGNIFICANT CHANGE UP
BUN SERPL-MCNC: 29 MG/DL — HIGH (ref 7–23)
CA-I SERPL-SCNC: 1.14 MMOL/L — SIGNIFICANT CHANGE UP (ref 1.12–1.3)
CALCIUM SERPL-MCNC: 8.5 MG/DL — SIGNIFICANT CHANGE UP (ref 8.4–10.5)
CHLORIDE SERPL-SCNC: 106 MMOL/L — SIGNIFICANT CHANGE UP (ref 96–108)
CO2 SERPL-SCNC: 20 MMOL/L — LOW (ref 22–31)
COLOR SPEC: YELLOW — SIGNIFICANT CHANGE UP
CREAT SERPL-MCNC: 1.47 MG/DL — HIGH (ref 0.5–1.3)
CRP SERPL-MCNC: 0.38 MG/DL — SIGNIFICANT CHANGE UP (ref 0–0.4)
D DIMER BLD IA.RAPID-MCNC: 1943 NG/ML DDU — HIGH
DIFF PNL FLD: NEGATIVE — SIGNIFICANT CHANGE UP
EOSINOPHIL # BLD AUTO: 0 K/UL — SIGNIFICANT CHANGE UP (ref 0–0.5)
EOSINOPHIL NFR BLD AUTO: 0 % — SIGNIFICANT CHANGE UP (ref 0–6)
ETHANOL SERPL-MCNC: <10 MG/DL — SIGNIFICANT CHANGE UP (ref 0–10)
FERRITIN SERPL-MCNC: 16 NG/ML — LOW (ref 30–400)
GAS PNL BLDV: 136 MMOL/L — LOW (ref 138–146)
GAS PNL BLDV: SIGNIFICANT CHANGE UP
GIANT PLATELETS BLD QL SMEAR: PRESENT — SIGNIFICANT CHANGE UP
GLUCOSE SERPL-MCNC: 262 MG/DL — HIGH (ref 70–99)
GLUCOSE UR QL: >=1000
HCO3 BLDV-SCNC: 22 MMOL/L — SIGNIFICANT CHANGE UP (ref 20–27)
HCT VFR BLD CALC: 33.6 % — LOW (ref 39–50)
HGB BLD-MCNC: 10.7 G/DL — LOW (ref 13–17)
HYPOCHROMIA BLD QL: SLIGHT — SIGNIFICANT CHANGE UP
INR BLD: 1.03 — SIGNIFICANT CHANGE UP (ref 0.88–1.16)
KETONES UR-MCNC: NEGATIVE — SIGNIFICANT CHANGE UP
LACTATE SERPL-SCNC: 4.7 MMOL/L — CRITICAL HIGH (ref 0.5–2)
LEUKOCYTE ESTERASE UR-ACNC: NEGATIVE — SIGNIFICANT CHANGE UP
LYMPHOCYTES # BLD AUTO: 0 % — LOW (ref 13–44)
LYMPHOCYTES # BLD AUTO: 0 K/UL — LOW (ref 1–3.3)
MACROCYTES BLD QL: SLIGHT — SIGNIFICANT CHANGE UP
MANUAL SMEAR VERIFICATION: SIGNIFICANT CHANGE UP
MCHC RBC-ENTMCNC: 29.1 PG — SIGNIFICANT CHANGE UP (ref 27–34)
MCHC RBC-ENTMCNC: 31.8 GM/DL — LOW (ref 32–36)
MCV RBC AUTO: 91.3 FL — SIGNIFICANT CHANGE UP (ref 80–100)
MONOCYTES # BLD AUTO: 0.04 K/UL — SIGNIFICANT CHANGE UP (ref 0–0.9)
MONOCYTES NFR BLD AUTO: 0.9 % — LOW (ref 2–14)
NEUTROPHILS # BLD AUTO: 4.57 K/UL — SIGNIFICANT CHANGE UP (ref 1.8–7.4)
NEUTROPHILS NFR BLD AUTO: 93.8 % — HIGH (ref 43–77)
NEUTS BAND # BLD: 4.4 % — SIGNIFICANT CHANGE UP (ref 0–8)
NITRITE UR-MCNC: NEGATIVE — SIGNIFICANT CHANGE UP
OVALOCYTES BLD QL SMEAR: SLIGHT — SIGNIFICANT CHANGE UP
PCO2 BLDV: 37 MMHG — LOW (ref 41–51)
PH BLDV: 7.38 — SIGNIFICANT CHANGE UP (ref 7.32–7.43)
PH UR: 6 — SIGNIFICANT CHANGE UP (ref 5–8)
PLAT MORPH BLD: ABNORMAL
PLATELET # BLD AUTO: 237 K/UL — SIGNIFICANT CHANGE UP (ref 150–400)
PO2 BLDV: 42 MMHG — SIGNIFICANT CHANGE UP
POLYCHROMASIA BLD QL SMEAR: SLIGHT — SIGNIFICANT CHANGE UP
POTASSIUM BLDV-SCNC: 4.7 MMOL/L — SIGNIFICANT CHANGE UP (ref 3.5–4.9)
POTASSIUM SERPL-MCNC: 4.9 MMOL/L — SIGNIFICANT CHANGE UP (ref 3.5–5.3)
POTASSIUM SERPL-SCNC: 4.9 MMOL/L — SIGNIFICANT CHANGE UP (ref 3.5–5.3)
PROCALCITONIN SERPL-MCNC: 7 NG/ML — HIGH (ref 0.02–0.1)
PROT SERPL-MCNC: 7 G/DL — SIGNIFICANT CHANGE UP (ref 6–8.3)
PROT UR-MCNC: NEGATIVE MG/DL — SIGNIFICANT CHANGE UP
PROTHROM AB SERPL-ACNC: 12.3 SEC — SIGNIFICANT CHANGE UP (ref 10.6–13.6)
RBC # BLD: 3.68 M/UL — LOW (ref 4.2–5.8)
RBC # FLD: 13.8 % — SIGNIFICANT CHANGE UP (ref 10.3–14.5)
RBC BLD AUTO: ABNORMAL
SAO2 % BLDV: 77 % — SIGNIFICANT CHANGE UP
SODIUM SERPL-SCNC: 142 MMOL/L — SIGNIFICANT CHANGE UP (ref 135–145)
SP GR SPEC: 1.01 — SIGNIFICANT CHANGE UP (ref 1–1.03)
TROPONIN T SERPL-MCNC: <0.01 NG/ML — SIGNIFICANT CHANGE UP (ref 0–0.01)
UROBILINOGEN FLD QL: 0.2 E.U./DL — SIGNIFICANT CHANGE UP
WBC # BLD: 4.65 K/UL — SIGNIFICANT CHANGE UP (ref 3.8–10.5)
WBC # FLD AUTO: 4.65 K/UL — SIGNIFICANT CHANGE UP (ref 3.8–10.5)

## 2020-10-12 PROCEDURE — 71045 X-RAY EXAM CHEST 1 VIEW: CPT | Mod: 26

## 2020-10-12 RX ORDER — SODIUM CHLORIDE 9 MG/ML
500 INJECTION INTRAMUSCULAR; INTRAVENOUS; SUBCUTANEOUS ONCE
Refills: 0 | Status: COMPLETED | OUTPATIENT
Start: 2020-10-12 | End: 2020-10-12

## 2020-10-12 RX ORDER — SODIUM CHLORIDE 9 MG/ML
1000 INJECTION INTRAMUSCULAR; INTRAVENOUS; SUBCUTANEOUS ONCE
Refills: 0 | Status: COMPLETED | OUTPATIENT
Start: 2020-10-12 | End: 2020-10-12

## 2020-10-12 RX ORDER — ACETAMINOPHEN 500 MG
650 TABLET ORAL ONCE
Refills: 0 | Status: COMPLETED | OUTPATIENT
Start: 2020-10-12 | End: 2020-10-12

## 2020-10-12 RX ORDER — PIPERACILLIN AND TAZOBACTAM 4; .5 G/20ML; G/20ML
3.38 INJECTION, POWDER, LYOPHILIZED, FOR SOLUTION INTRAVENOUS ONCE
Refills: 0 | Status: COMPLETED | OUTPATIENT
Start: 2020-10-12 | End: 2020-10-12

## 2020-10-12 RX ADMIN — Medication 650 MILLIGRAM(S): at 22:15

## 2020-10-12 RX ADMIN — SODIUM CHLORIDE 1000 MILLILITER(S): 9 INJECTION INTRAMUSCULAR; INTRAVENOUS; SUBCUTANEOUS at 22:30

## 2020-10-12 RX ADMIN — PIPERACILLIN AND TAZOBACTAM 200 GRAM(S): 4; .5 INJECTION, POWDER, LYOPHILIZED, FOR SOLUTION INTRAVENOUS at 22:25

## 2020-10-12 RX ADMIN — SODIUM CHLORIDE 1000 MILLILITER(S): 9 INJECTION INTRAMUSCULAR; INTRAVENOUS; SUBCUTANEOUS at 23:50

## 2020-10-12 RX ADMIN — Medication 650 MILLIGRAM(S): at 21:45

## 2020-10-12 RX ADMIN — PIPERACILLIN AND TAZOBACTAM 3.38 GRAM(S): 4; .5 INJECTION, POWDER, LYOPHILIZED, FOR SOLUTION INTRAVENOUS at 22:55

## 2020-10-12 RX ADMIN — SODIUM CHLORIDE 1000 MILLILITER(S): 9 INJECTION INTRAMUSCULAR; INTRAVENOUS; SUBCUTANEOUS at 21:30

## 2020-10-12 NOTE — ED PROVIDER NOTE - PROGRESS NOTE DETAILS
will give judicious ivf pending cxr and covid markers given no acute resp distress and no acute pulm edema or significantly elevated covid markers, will complete remaining sepsis ivf.

## 2020-10-12 NOTE — ED PROVIDER NOTE - OBJECTIVE STATEMENT
89M nonsmoker, htn, niddm, hld, PAD on asa/plavix, chronic ble edema, bph, c/o <6h rigors. +achy bl low back pain. at baseline this AM. has been drinking etoh (vodka/beer) w/ friends on weekends, as well as last night -- no etoh-dpt, no phx WD/DTs. no fever, no ha/dizziness, no neck pain/stiffness, no uri/cough, no cp/sob, no abd pain/n/v, no diarrhea, no hematochezia/melena, no dysuria, no pedal rash/ttp, no recent travel, no sick contacts, no prior covid, no trauma.     at baseline, highly functional, a+ox3, +ADLs, ambulates w/ cane, lives at home w/ family. 89M nonsmoker, htn, niddm, hld, PAD s/p R angio/sfa stent (8/2015) w/ confirmed patent sfa stent on angiogram (6/2020) on asa/plavix, chronic ble edema, bph, c/o <6h rigors. +achy bl low back pain. at baseline this AM. has been drinking etoh (vodka/beer) w/ friends on weekends, as well as last night -- no etoh-dpt or phx WD/DTs. no fever, no ha/dizziness, no neck pain/stiffness, no uri/cough, no cp/sob, no abd pain/n/v, no diarrhea, no hematochezia/melena, no dysuria, no pedal rash/ttp, no back pain, no extremity weakness/numbness/paresthesia, no saddle anesthesia, no urinary/fecal incontinence/retention, no recent travel, no sick contacts, no prior covid, no trauma.     at baseline, highly functional, a+ox3, +ADLs, ambulates w/ cane, lives at home w/ wife. 89M nonsmoker, htn, niddm, hld, PAD s/p R angio/sfa stent (8/2015) w/ confirmed patent sfa stent on angiogram (6/2020) on asa/plavix, chronic ble edema, bph, c/o <6h rigors. +generalized weakness. +achy bl low back pain. at baseline this AM. has been drinking etoh (vodka/beer) w/ friends on weekends, as well as last night -- no etoh-dpt or phx WD/DTs. no fever, no ha/dizziness, no neck pain/stiffness, no uri/cough, no cp/sob, no abd pain/n/v, no diarrhea, no hematochezia/melena, no dysuria, no pedal rash/ttp, no back pain, no extremity weakness/numbness/paresthesia, no saddle anesthesia, no urinary/fecal incontinence/retention, no recent travel, no sick contacts, no prior covid, no trauma.     at baseline, highly functional, a+ox3, +ADLs, ambulates w/ cane, lives at home w/ wife.    wife: darline caraballo: [phone]

## 2020-10-12 NOTE — ED PROVIDER NOTE - PHYSICAL EXAMINATION
CONST: nontoxic NAD speaking in full sentences  HEAD: atraumatic  EYES: conjunctivae clear, PERRL, EOMI  ENT: dry mucous membranes  NECK: supple/FROM, nttp, no jvd  CARD: rrr no murmurs  CHEST: ctab no r/r/w, no stridor/retractions/tripoding  ABD: soft, nd, nttp, no rebound/guarding, no cvat  BACK: no midline ttp, +dull diffuse low back ttp, no rash/skin changes  EXT: FROM, symmetric distal pulses intact  SKIN: warm, dry, no pedal edema/rash/ttp, cap refill <2sec  NEURO: a+ox3, 5/5 strength x4, gross sensation intact x4, normal gait

## 2020-10-12 NOTE — ED ADULT NURSE NOTE - NSIMPLEMENTINTERV_GEN_ALL_ED
Implemented All Fall Risk Interventions:  [place] to call system. Call bell, personal items and telephone within reach. Instruct patient to call for assistance. Room bathroom lighting operational. Non-slip footwear when patient is off stretcher. Physically safe environment: no spills, clutter or unnecessary equipment. Stretcher in lowest position, wheels locked, appropriate side rails in place. Provide visual cue, wrist band, yellow gown, etc. Monitor gait and stability. Monitor for mental status changes and reorient to person, place, and time. Review medications for side effects contributing to fall risk. Reinforce activity limits and safety measures with patient and family.

## 2020-10-12 NOTE — ED ADULT NURSE NOTE - CHPI ED NUR SYMPTOMS NEG
no diarrhea/no abdominal pain/no cough/no decreased eating/drinking/no shortness of breath/no rash/no vomiting/no headache

## 2020-10-12 NOTE — ED ADULT NURSE NOTE - OBJECTIVE STATEMENT
Pt came in cc of chills/ Pt came in cc of chills/fever. Pt reports he has been drinking ETOH with friends on the weekends so possible exposure to covid. pt c/o chills, denies pain, no N/V/D, no SOB. Pt has mild labored breathing at rest. Pt tachycardic, febrile, tachypneic on arrival. Pt denies cough, no SOB/CP. Pt Aox3. Pt reports he uses cane to walk but less now d/t pain to bilateral feet.

## 2020-10-12 NOTE — ED PROVIDER NOTE - CLINICAL SUMMARY MEDICAL DECISION MAKING FREE TEXT BOX
febrile. initially sinus tachy improved w/ fever defervescence/ivf. otherwise hds. no hypoxia. found to have septic shock w/ lactate 4s, improved to 2s s/p ivf. procalcitonin 7s. no acute focal neuro deficits. no midline back pain or red flag c/f spinal abscess. no meningismus. no rash. ua neg. covid/rvp pending. cxr w/o acute focal consolidation vs other acute abnl. ddimer elevated. cta chest neg for acute pe vs consolidation. s/p abx for ?source. bcx pending. no electrolyte abnl. trop neg. ekg w/o acute abnl. given persistent generalized weakness, will admit. admitting team will fu rvp/covid.

## 2020-10-13 ENCOUNTER — INPATIENT (INPATIENT)
Facility: HOSPITAL | Age: 85
LOS: 2 days | Discharge: HOME | DRG: 872 | End: 2020-10-16
Attending: HOSPITALIST | Admitting: INTERNAL MEDICINE
Payer: MEDICARE

## 2020-10-13 DIAGNOSIS — R63.8 OTHER SYMPTOMS AND SIGNS CONCERNING FOOD AND FLUID INTAKE: ICD-10-CM

## 2020-10-13 DIAGNOSIS — N40.0 BENIGN PROSTATIC HYPERPLASIA WITHOUT LOWER URINARY TRACT SYMPTOMS: ICD-10-CM

## 2020-10-13 DIAGNOSIS — Z95.828 PRESENCE OF OTHER VASCULAR IMPLANTS AND GRAFTS: Chronic | ICD-10-CM

## 2020-10-13 DIAGNOSIS — I10 ESSENTIAL (PRIMARY) HYPERTENSION: ICD-10-CM

## 2020-10-13 DIAGNOSIS — A41.9 SEPSIS, UNSPECIFIED ORGANISM: ICD-10-CM

## 2020-10-13 DIAGNOSIS — N18.30 CHRONIC KIDNEY DISEASE, STAGE 3 UNSPECIFIED: ICD-10-CM

## 2020-10-13 DIAGNOSIS — H26.9 UNSPECIFIED CATARACT: Chronic | ICD-10-CM

## 2020-10-13 DIAGNOSIS — Z98.890 OTHER SPECIFIED POSTPROCEDURAL STATES: Chronic | ICD-10-CM

## 2020-10-13 DIAGNOSIS — R53.1 WEAKNESS: ICD-10-CM

## 2020-10-13 DIAGNOSIS — E04.1 NONTOXIC SINGLE THYROID NODULE: ICD-10-CM

## 2020-10-13 DIAGNOSIS — N28.1 CYST OF KIDNEY, ACQUIRED: ICD-10-CM

## 2020-10-13 DIAGNOSIS — E78.5 HYPERLIPIDEMIA, UNSPECIFIED: ICD-10-CM

## 2020-10-13 DIAGNOSIS — E11.9 TYPE 2 DIABETES MELLITUS WITHOUT COMPLICATIONS: ICD-10-CM

## 2020-10-13 DIAGNOSIS — R60.0 LOCALIZED EDEMA: ICD-10-CM

## 2020-10-13 LAB
A1C WITH ESTIMATED AVERAGE GLUCOSE RESULT: 9 % — HIGH (ref 4–5.6)
ANION GAP SERPL CALC-SCNC: 9 MMOL/L — SIGNIFICANT CHANGE UP (ref 5–17)
ANISOCYTOSIS BLD QL: SLIGHT — SIGNIFICANT CHANGE UP
B PERT DNA SPEC QL NAA+PROBE: SIGNIFICANT CHANGE UP
BASOPHILS # BLD AUTO: 0 K/UL — SIGNIFICANT CHANGE UP (ref 0–0.2)
BASOPHILS NFR BLD AUTO: 0 % — SIGNIFICANT CHANGE UP (ref 0–2)
BUN SERPL-MCNC: 27 MG/DL — HIGH (ref 7–23)
C PNEUM DNA SPEC QL NAA+PROBE: SIGNIFICANT CHANGE UP
CALCIUM SERPL-MCNC: 7.9 MG/DL — LOW (ref 8.4–10.5)
CHLORIDE SERPL-SCNC: 110 MMOL/L — HIGH (ref 96–108)
CO2 SERPL-SCNC: 20 MMOL/L — LOW (ref 22–31)
CREAT SERPL-MCNC: 1.47 MG/DL — HIGH (ref 0.5–1.3)
EOSINOPHIL # BLD AUTO: 0 K/UL — SIGNIFICANT CHANGE UP (ref 0–0.5)
EOSINOPHIL NFR BLD AUTO: 0 % — SIGNIFICANT CHANGE UP (ref 0–6)
ESTIMATED AVERAGE GLUCOSE: 212 MG/DL — HIGH (ref 68–114)
FLUAV H1 2009 PAND RNA SPEC QL NAA+PROBE: SIGNIFICANT CHANGE UP
FLUAV H1 RNA SPEC QL NAA+PROBE: SIGNIFICANT CHANGE UP
FLUAV H3 RNA SPEC QL NAA+PROBE: SIGNIFICANT CHANGE UP
FLUAV SUBTYP SPEC NAA+PROBE: SIGNIFICANT CHANGE UP
FLUBV RNA SPEC QL NAA+PROBE: SIGNIFICANT CHANGE UP
GLUCOSE BLDC GLUCOMTR-MCNC: 137 MG/DL — HIGH (ref 70–99)
GLUCOSE BLDC GLUCOMTR-MCNC: 171 MG/DL — HIGH (ref 70–99)
GLUCOSE BLDC GLUCOMTR-MCNC: 187 MG/DL — HIGH (ref 70–99)
GLUCOSE BLDC GLUCOMTR-MCNC: 196 MG/DL — HIGH (ref 70–99)
GLUCOSE SERPL-MCNC: 207 MG/DL — HIGH (ref 70–99)
HADV DNA SPEC QL NAA+PROBE: SIGNIFICANT CHANGE UP
HCOV PNL SPEC NAA+PROBE: SIGNIFICANT CHANGE UP
HCT VFR BLD CALC: 28 % — LOW (ref 39–50)
HCT VFR BLD CALC: 29.5 % — LOW (ref 39–50)
HGB BLD-MCNC: 8.6 G/DL — LOW (ref 13–17)
HGB BLD-MCNC: 8.9 G/DL — LOW (ref 13–17)
HMPV RNA SPEC QL NAA+PROBE: SIGNIFICANT CHANGE UP
HPIV1 RNA SPEC QL NAA+PROBE: SIGNIFICANT CHANGE UP
HPIV2 RNA SPEC QL NAA+PROBE: SIGNIFICANT CHANGE UP
HPIV3 RNA SPEC QL NAA+PROBE: SIGNIFICANT CHANGE UP
HPIV4 RNA SPEC QL NAA+PROBE: SIGNIFICANT CHANGE UP
LACTATE SERPL-SCNC: 2 MMOL/L — SIGNIFICANT CHANGE UP (ref 0.5–2)
LEGIONELLA AG UR QL: NEGATIVE — SIGNIFICANT CHANGE UP
LIDOCAIN IGE QN: 43 U/L — SIGNIFICANT CHANGE UP (ref 7–60)
LYMPHOCYTES # BLD AUTO: 0.8 K/UL — LOW (ref 1–3.3)
LYMPHOCYTES # BLD AUTO: 5.2 % — LOW (ref 13–44)
MACROCYTES BLD QL: SLIGHT — SIGNIFICANT CHANGE UP
MAGNESIUM SERPL-MCNC: 1.8 MG/DL — SIGNIFICANT CHANGE UP (ref 1.6–2.6)
MANUAL SMEAR VERIFICATION: SIGNIFICANT CHANGE UP
MCHC RBC-ENTMCNC: 28.3 PG — SIGNIFICANT CHANGE UP (ref 27–34)
MCHC RBC-ENTMCNC: 28.3 PG — SIGNIFICANT CHANGE UP (ref 27–34)
MCHC RBC-ENTMCNC: 30.2 GM/DL — LOW (ref 32–36)
MCHC RBC-ENTMCNC: 30.7 GM/DL — LOW (ref 32–36)
MCV RBC AUTO: 92.1 FL — SIGNIFICANT CHANGE UP (ref 80–100)
MCV RBC AUTO: 93.9 FL — SIGNIFICANT CHANGE UP (ref 80–100)
MICROCYTES BLD QL: SLIGHT — SIGNIFICANT CHANGE UP
MONOCYTES # BLD AUTO: 0.54 K/UL — SIGNIFICANT CHANGE UP (ref 0–0.9)
MONOCYTES NFR BLD AUTO: 3.5 % — SIGNIFICANT CHANGE UP (ref 2–14)
MRSA PCR RESULT.: NEGATIVE — SIGNIFICANT CHANGE UP
NEUTROPHILS # BLD AUTO: 14.04 K/UL — HIGH (ref 1.8–7.4)
NEUTROPHILS NFR BLD AUTO: 82.6 % — HIGH (ref 43–77)
NEUTS BAND # BLD: 8.7 % — HIGH (ref 0–8)
NRBC # BLD: 0 /100 WBCS — SIGNIFICANT CHANGE UP (ref 0–0)
PHOSPHATE SERPL-MCNC: 2.9 MG/DL — SIGNIFICANT CHANGE UP (ref 2.5–4.5)
PLAT MORPH BLD: NORMAL — SIGNIFICANT CHANGE UP
PLATELET # BLD AUTO: 193 K/UL — SIGNIFICANT CHANGE UP (ref 150–400)
PLATELET # BLD AUTO: 197 K/UL — SIGNIFICANT CHANGE UP (ref 150–400)
POLYCHROMASIA BLD QL SMEAR: SLIGHT — SIGNIFICANT CHANGE UP
POTASSIUM SERPL-MCNC: 4.5 MMOL/L — SIGNIFICANT CHANGE UP (ref 3.5–5.3)
POTASSIUM SERPL-SCNC: 4.5 MMOL/L — SIGNIFICANT CHANGE UP (ref 3.5–5.3)
PROCALCITONIN SERPL-MCNC: 78.09 NG/ML — HIGH (ref 0.02–0.1)
RAPID RVP RESULT: SIGNIFICANT CHANGE UP
RBC # BLD: 3.04 M/UL — LOW (ref 4.2–5.8)
RBC # BLD: 3.14 M/UL — LOW (ref 4.2–5.8)
RBC # FLD: 14.2 % — SIGNIFICANT CHANGE UP (ref 10.3–14.5)
RBC # FLD: 14.2 % — SIGNIFICANT CHANGE UP (ref 10.3–14.5)
RBC BLD AUTO: ABNORMAL
RSV RNA SPEC QL NAA+PROBE: SIGNIFICANT CHANGE UP
RV+EV RNA SPEC QL NAA+PROBE: SIGNIFICANT CHANGE UP
S AUREUS DNA NOSE QL NAA+PROBE: POSITIVE
SARS-COV-2 RNA SPEC QL NAA+PROBE: SIGNIFICANT CHANGE UP
SARS-COV-2 RNA SPEC QL NAA+PROBE: SIGNIFICANT CHANGE UP
SODIUM SERPL-SCNC: 139 MMOL/L — SIGNIFICANT CHANGE UP (ref 135–145)
WBC # BLD: 15.16 K/UL — HIGH (ref 3.8–10.5)
WBC # BLD: 15.38 K/UL — HIGH (ref 3.8–10.5)
WBC # FLD AUTO: 15.16 K/UL — HIGH (ref 3.8–10.5)
WBC # FLD AUTO: 15.38 K/UL — HIGH (ref 3.8–10.5)

## 2020-10-13 PROCEDURE — 12345: CPT | Mod: NC,GC

## 2020-10-13 PROCEDURE — 93010 ELECTROCARDIOGRAM REPORT: CPT

## 2020-10-13 PROCEDURE — 99285 EMERGENCY DEPT VISIT HI MDM: CPT

## 2020-10-13 PROCEDURE — 99223 1ST HOSP IP/OBS HIGH 75: CPT | Mod: GC

## 2020-10-13 PROCEDURE — 71275 CT ANGIOGRAPHY CHEST: CPT | Mod: 26

## 2020-10-13 RX ORDER — DEXTROSE 50 % IN WATER 50 %
15 SYRINGE (ML) INTRAVENOUS ONCE
Refills: 0 | Status: DISCONTINUED | OUTPATIENT
Start: 2020-10-13 | End: 2020-10-16

## 2020-10-13 RX ORDER — INFLUENZA VIRUS VACCINE 15; 15; 15; 15 UG/.5ML; UG/.5ML; UG/.5ML; UG/.5ML
0.7 SUSPENSION INTRAMUSCULAR ONCE
Refills: 0 | Status: DISCONTINUED | OUTPATIENT
Start: 2020-10-13 | End: 2020-10-16

## 2020-10-13 RX ORDER — INSULIN LISPRO 100/ML
VIAL (ML) SUBCUTANEOUS AT BEDTIME
Refills: 0 | Status: DISCONTINUED | OUTPATIENT
Start: 2020-10-13 | End: 2020-10-13

## 2020-10-13 RX ORDER — DEXTROSE 50 % IN WATER 50 %
25 SYRINGE (ML) INTRAVENOUS ONCE
Refills: 0 | Status: DISCONTINUED | OUTPATIENT
Start: 2020-10-13 | End: 2020-10-16

## 2020-10-13 RX ORDER — TAMSULOSIN HYDROCHLORIDE 0.4 MG/1
0.4 CAPSULE ORAL AT BEDTIME
Refills: 0 | Status: DISCONTINUED | OUTPATIENT
Start: 2020-10-13 | End: 2020-10-16

## 2020-10-13 RX ORDER — FERROUS SULFATE 325(65) MG
325 TABLET ORAL DAILY
Refills: 0 | Status: DISCONTINUED | OUTPATIENT
Start: 2020-10-13 | End: 2020-10-16

## 2020-10-13 RX ORDER — ASPIRIN/CALCIUM CARB/MAGNESIUM 324 MG
81 TABLET ORAL DAILY
Refills: 0 | Status: DISCONTINUED | OUTPATIENT
Start: 2020-10-13 | End: 2020-10-16

## 2020-10-13 RX ORDER — AZITHROMYCIN 500 MG/1
TABLET, FILM COATED ORAL
Refills: 0 | Status: DISCONTINUED | OUTPATIENT
Start: 2020-10-13 | End: 2020-10-13

## 2020-10-13 RX ORDER — CLOPIDOGREL BISULFATE 75 MG/1
75 TABLET, FILM COATED ORAL DAILY
Refills: 0 | Status: DISCONTINUED | OUTPATIENT
Start: 2020-10-13 | End: 2020-10-15

## 2020-10-13 RX ORDER — INFLUENZA VIRUS VACCINE 15; 15; 15; 15 UG/.5ML; UG/.5ML; UG/.5ML; UG/.5ML
0.5 SUSPENSION INTRAMUSCULAR ONCE
Refills: 0 | Status: DISCONTINUED | OUTPATIENT
Start: 2020-10-13 | End: 2020-10-13

## 2020-10-13 RX ORDER — PIPERACILLIN AND TAZOBACTAM 4; .5 G/20ML; G/20ML
3.38 INJECTION, POWDER, LYOPHILIZED, FOR SOLUTION INTRAVENOUS EVERY 8 HOURS
Refills: 0 | Status: DISCONTINUED | OUTPATIENT
Start: 2020-10-13 | End: 2020-10-13

## 2020-10-13 RX ORDER — MAGNESIUM SULFATE 500 MG/ML
2 VIAL (ML) INJECTION ONCE
Refills: 0 | Status: COMPLETED | OUTPATIENT
Start: 2020-10-13 | End: 2020-10-13

## 2020-10-13 RX ORDER — PIPERACILLIN AND TAZOBACTAM 4; .5 G/20ML; G/20ML
3.38 INJECTION, POWDER, LYOPHILIZED, FOR SOLUTION INTRAVENOUS ONCE
Refills: 0 | Status: COMPLETED | OUTPATIENT
Start: 2020-10-13 | End: 2020-10-13

## 2020-10-13 RX ORDER — DEXTROSE 50 % IN WATER 50 %
12.5 SYRINGE (ML) INTRAVENOUS ONCE
Refills: 0 | Status: DISCONTINUED | OUTPATIENT
Start: 2020-10-13 | End: 2020-10-16

## 2020-10-13 RX ORDER — CEFTRIAXONE 500 MG/1
1000 INJECTION, POWDER, FOR SOLUTION INTRAMUSCULAR; INTRAVENOUS EVERY 24 HOURS
Refills: 0 | Status: DISCONTINUED | OUTPATIENT
Start: 2020-10-13 | End: 2020-10-13

## 2020-10-13 RX ORDER — INSULIN GLARGINE 100 [IU]/ML
8 INJECTION, SOLUTION SUBCUTANEOUS AT BEDTIME
Refills: 0 | Status: DISCONTINUED | OUTPATIENT
Start: 2020-10-13 | End: 2020-10-16

## 2020-10-13 RX ORDER — INSULIN LISPRO 100/ML
VIAL (ML) SUBCUTANEOUS
Refills: 0 | Status: DISCONTINUED | OUTPATIENT
Start: 2020-10-13 | End: 2020-10-16

## 2020-10-13 RX ORDER — GLUCAGON INJECTION, SOLUTION 0.5 MG/.1ML
1 INJECTION, SOLUTION SUBCUTANEOUS ONCE
Refills: 0 | Status: DISCONTINUED | OUTPATIENT
Start: 2020-10-13 | End: 2020-10-16

## 2020-10-13 RX ORDER — AZITHROMYCIN 500 MG/1
500 TABLET, FILM COATED ORAL ONCE
Refills: 0 | Status: COMPLETED | OUTPATIENT
Start: 2020-10-13 | End: 2020-10-13

## 2020-10-13 RX ORDER — ACETAMINOPHEN 500 MG
650 TABLET ORAL ONCE
Refills: 0 | Status: COMPLETED | OUTPATIENT
Start: 2020-10-13 | End: 2020-10-13

## 2020-10-13 RX ORDER — MAGNESIUM SULFATE 500 MG/ML
1 VIAL (ML) INJECTION ONCE
Refills: 0 | Status: COMPLETED | OUTPATIENT
Start: 2020-10-13 | End: 2020-10-13

## 2020-10-13 RX ORDER — SODIUM CHLORIDE 9 MG/ML
1000 INJECTION, SOLUTION INTRAVENOUS
Refills: 0 | Status: DISCONTINUED | OUTPATIENT
Start: 2020-10-13 | End: 2020-10-16

## 2020-10-13 RX ORDER — PIPERACILLIN AND TAZOBACTAM 4; .5 G/20ML; G/20ML
3.38 INJECTION, POWDER, LYOPHILIZED, FOR SOLUTION INTRAVENOUS EVERY 6 HOURS
Refills: 0 | Status: DISCONTINUED | OUTPATIENT
Start: 2020-10-13 | End: 2020-10-15

## 2020-10-13 RX ORDER — HEPARIN SODIUM 5000 [USP'U]/ML
5000 INJECTION INTRAVENOUS; SUBCUTANEOUS EVERY 8 HOURS
Refills: 0 | Status: DISCONTINUED | OUTPATIENT
Start: 2020-10-13 | End: 2020-10-14

## 2020-10-13 RX ORDER — ATORVASTATIN CALCIUM 80 MG/1
10 TABLET, FILM COATED ORAL AT BEDTIME
Refills: 0 | Status: DISCONTINUED | OUTPATIENT
Start: 2020-10-13 | End: 2020-10-16

## 2020-10-13 RX ADMIN — ATORVASTATIN CALCIUM 10 MILLIGRAM(S): 80 TABLET, FILM COATED ORAL at 22:16

## 2020-10-13 RX ADMIN — Medication 100 GRAM(S): at 12:18

## 2020-10-13 RX ADMIN — PIPERACILLIN AND TAZOBACTAM 200 GRAM(S): 4; .5 INJECTION, POWDER, LYOPHILIZED, FOR SOLUTION INTRAVENOUS at 16:43

## 2020-10-13 RX ADMIN — HEPARIN SODIUM 5000 UNIT(S): 5000 INJECTION INTRAVENOUS; SUBCUTANEOUS at 13:41

## 2020-10-13 RX ADMIN — HEPARIN SODIUM 5000 UNIT(S): 5000 INJECTION INTRAVENOUS; SUBCUTANEOUS at 22:16

## 2020-10-13 RX ADMIN — SODIUM CHLORIDE 1000 MILLILITER(S): 9 INJECTION INTRAMUSCULAR; INTRAVENOUS; SUBCUTANEOUS at 00:50

## 2020-10-13 RX ADMIN — Medication 325 MILLIGRAM(S): at 12:18

## 2020-10-13 RX ADMIN — Medication 81 MILLIGRAM(S): at 11:32

## 2020-10-13 RX ADMIN — Medication 650 MILLIGRAM(S): at 00:55

## 2020-10-13 RX ADMIN — SODIUM CHLORIDE 500 MILLILITER(S): 9 INJECTION INTRAMUSCULAR; INTRAVENOUS; SUBCUTANEOUS at 00:50

## 2020-10-13 RX ADMIN — CLOPIDOGREL BISULFATE 75 MILLIGRAM(S): 75 TABLET, FILM COATED ORAL at 11:32

## 2020-10-13 RX ADMIN — TAMSULOSIN HYDROCHLORIDE 0.4 MILLIGRAM(S): 0.4 CAPSULE ORAL at 22:16

## 2020-10-13 RX ADMIN — Medication 650 MILLIGRAM(S): at 00:27

## 2020-10-13 RX ADMIN — AZITHROMYCIN 255 MILLIGRAM(S): 500 TABLET, FILM COATED ORAL at 05:47

## 2020-10-13 RX ADMIN — PIPERACILLIN AND TAZOBACTAM 200 GRAM(S): 4; .5 INJECTION, POWDER, LYOPHILIZED, FOR SOLUTION INTRAVENOUS at 11:32

## 2020-10-13 RX ADMIN — Medication 100 GRAM(S): at 09:27

## 2020-10-13 RX ADMIN — HEPARIN SODIUM 5000 UNIT(S): 5000 INJECTION INTRAVENOUS; SUBCUTANEOUS at 05:48

## 2020-10-13 RX ADMIN — Medication 2: at 12:50

## 2020-10-13 RX ADMIN — INSULIN GLARGINE 8 UNIT(S): 100 INJECTION, SOLUTION SUBCUTANEOUS at 22:17

## 2020-10-13 NOTE — PROGRESS NOTE ADULT - PROBLEM SELECTOR PLAN 2
Pt has known hx of HTN and takes Amlodipine 10mg + Lisinopril 10mg at home.  - c/w holding antihypertensives given that pt has severe sepsis

## 2020-10-13 NOTE — H&P ADULT - HISTORY OF PRESENT ILLNESS
89M HTN, DM2, HLD, PAD s/p R angio/sfa stent (8/2015) w/ confirmed patent sfa stent on angiogram (6/2020) on asa/plavix, chronic LE edema, BPH, presenting with rigors and generalized weakness. Per patient and wife, symptoms started today. Pt also reports achy low back pain. Denies HA, dizziness, lightheadedness, neck pain/stiffness, uri/cough, CP, chest tightness, b, no abd pain/n/v, no diarrhea, no hematochezia/melena, no dysuria, no pedal rash/ttp, no back pain, no extremity weakness/numbness/paresthesia, no saddle anesthesia, no urinary/fecal incontinence/retention, no recent travel, no sick contacts, no prior covid, no trauma.    89M HTN, DM2, HLD, PAD s/p R angio/sfa stent (8/2015) w/ confirmed patent sfa stent on angiogram (6/2020) on asa/plavix, chronic LE edema, BPH, presenting with rigors and generalized weakness. Per patient and wife, symptoms started today. Pt also reports achy low back pain. Denies HA, dizziness, lightheadedness, neck pain, neck stiffness, uri symptoms, cough, CP, chest tightness, no abd pain, N/V/D, no hematochezia/melena, no dysuria, no rash, no back pain, no extremity weakness/numbness/paresthesia, no saddle anesthesia, no urinary/fecal incontinence/retention, no recent travel, no sick contacts, no prior covid, no trauma.     ED Vitals: Tmax 102.8F, HR 90s-120s, BP 120s-140s/50s-60s RR 20 SpO2 95-98% RA  ED labs: WBC wnl, H/H 10.7/33.6, Cr 1.47, D-dimer 1943, Lactate 4.7 --> 2.0 COVID- UA- Procalcitonin 7, Ferritin 16  ED Course: Tylenol 650mg x2, 2.5L NS, Zosyn 3.375g     89M HTN, DM2, HLD, PAD s/p R angio/sfa stent (8/2015) w/ confirmed patent sfa stent on angiogram (6/2020) on asa/plavix, chronic LE edema, BPH, presenting with rigors and generalized weakness. Per patient and wife, symptoms started today. Pt also reports achy low back pain (which is chronic ~ 2years, with no associated trauma) and foot pain when ambulating. Denies HA, dizziness, lightheadedness, neck pain, neck stiffness, URI symptoms, cough, CP, chest tightness, abd pain, N/V/D, no hematochezia/melena, no dysuria, no rash, no back pain, no extremity weakness/numbness/paresthesia, no saddle anesthesia, no urinary/fecal incontinence/retention, no recent travel, no sick contacts, no prior covid, no trauma.     ED Vitals: Tmax 102.8F, HR 90s-120s, BP 120s-140s/50s-60s RR 20 SpO2 95-98% RA  ED labs: WBC wnl, H/H 10.7/33.6, Cr 1.47, D-dimer 1943, Lactate 4.7 --> 2.0 COVID- UA- Procalcitonin 7, Ferritin 16  EKG: sinus tachycardia, left axis deviation, RBBB, QTc 478  ED Course: Tylenol 650mg x2, 2.5L NS, Zosyn 3.375g       89M Anguillan-speaking with PMHx HTN, DM2, HLD, PAD s/p R angio/sfa stent (8/2015) w/ confirmed patent sfa stent on angiogram (6/2020) on asa/plavix, chronic LE edema, BPH, presenting with rigors and generalized weakness. Per patient and wife, symptoms started today. Pt also reports achy low back pain (which is chronic ~ 2years, with no associated trauma) and foot pain when ambulating. Denies HA, dizziness, lightheadedness, neck pain, neck stiffness, URI symptoms, cough, CP, chest tightness, abd pain, N/V/D, no hematochezia/melena, no dysuria, no rash, no back pain, no extremity weakness/numbness/paresthesia, no saddle anesthesia, no urinary/fecal incontinence/retention, no recent travel, no sick contacts, no prior covid, no trauma.     ED Vitals: Tmax 102.8F, HR 90s-120s, BP 100s-140s/50s-60s RR 20 SpO2 95-98% RA  ED labs: WBC wnl, H/H 10.7/33.6, Cr 1.47, D-dimer 1943, Lactate 4.7 --> 2.0 COVID- UA- Procalcitonin 7, Ferritin 16  EKG: sinus tachycardia, left axis deviation, RBBB, QTc 478  ED Course: Tylenol 650mg x2, 2.5L NS, Zosyn 3.375g       89M Zambian-speaking with PMHx HTN, DM2, HLD, PAD s/p R angio/sfa stent (8/2015) w/ confirmed patent sfa stent on angiogram (6/2020) on asa/plavix, chronic LE edema, BPH, presenting with rigors and generalized weakness. Per patient and wife, symptoms started today. Pt also reports achy low back pain (which is chronic ~ 2years, with no associated trauma) and foot pain when ambulating. Denies HA, dizziness, lightheadedness, neck pain, neck stiffness, URI symptoms, cough, CP, chest tightness, abd pain, N/V/D, no hematochezia/melena, no dysuria, no rash, no back pain, no extremity weakness/numbness/paresthesia, no saddle anesthesia, no urinary/fecal incontinence/retention, no recent travel, no sick contacts, no prior covid, no trauma.     ED Vitals: Tmax 102.8F, HR 90s-120s, BP 100s-140s/50s-60s RR 20 SpO2 95-98% RA  ED labs: WBC wnl, H/H 10.7/33.6, Cr 1.47, D-dimer 1943, Lactate 4.7 --> 2.0 COVID- UA- Procalcitonin 7, Ferritin 16  EKG: sinus tachycardia, left axis deviation, RBBB, QTc 478  CT PE: no PE, hypodense nodules in thyroid  ED Course: Tylenol 650mg x2, 2.5L NS, Zosyn 3.375g

## 2020-10-13 NOTE — H&P ADULT - PROBLEM SELECTOR PLAN 3
-takes pravastatin 20mg at home  -per therapeutic interchange, continuing atorvastatin 10mg qhs #CKD3  Pt with elevated Cr 1.47, unknown baseline  -continue to monitor Cr

## 2020-10-13 NOTE — H&P ADULT - PROBLEM SELECTOR PROBLEM 6
Nutrition, metabolism, and development symptoms Thyroid nodule incidentally noted on imaging study BPH (benign prostatic hyperplasia) HLD (hyperlipidemia)

## 2020-10-13 NOTE — H&P ADULT - NSHPLABSRESULTS_GEN_ALL_CORE
LABS:                        10.7   4.65  )-----------( 237      ( 12 Oct 2020 21:43 )             33.6     10-12    142  |  106  |  29<H>  ----------------------------<  262<H>  4.9   |  20<L>  |  1.47<H>    Ca    8.5      12 Oct 2020 21:43    TPro  7.0  /  Alb  3.6  /  TBili  0.2  /  DBili  x   /  AST  27  /  ALT  15  /  AlkPhos  125<H>  10-12    PT/INR - ( 12 Oct 2020 21:43 )   PT: 12.3 sec;   INR: 1.03          PTT - ( 12 Oct 2020 21:43 )  PTT:26.6 sec  Lactate, Blood: 2.0 mmol/L (10-13-20 @ 01:03)  Lactate, Blood: 4.7 mmol/L (10-12-20 @ 21:42)    CARDIAC MARKERS ( 12 Oct 2020 22:13 )  x     / <0.01 ng/mL / x     / x     / x          Urinalysis Basic - ( 12 Oct 2020 22:44 )    Color: Yellow / Appearance: Clear / S.015 / pH: x  Gluc: x / Ketone: NEGATIVE  / Bili: Negative / Urobili: 0.2 E.U./dL   Blood: x / Protein: NEGATIVE mg/dL / Nitrite: NEGATIVE   Leuk Esterase: NEGATIVE / RBC: x / WBC x   Sq Epi: x / Non Sq Epi: x / Bacteria: x      Blood Gas Profile w/Lytes - Venous: Performed in Lab (10-12-20 @ 21:49)      EKG: Reviewed.    RADIOLOGY & ADDITIONAL TESTS: Reviewed.

## 2020-10-13 NOTE — H&P ADULT - ASSESSMENT
89M HTN, DM2, HLD, PAD s/p R angio/sfa stent (8/2015) w/ confirmed patent sfa stent on angiogram (6/2020) on asa/plavix, chronic LE edema, BPH, presenting with rigors and generalized weakness found to have severe sepsis with no known source, admitted for further monitoring.

## 2020-10-13 NOTE — H&P ADULT - NSICDXPASTMEDICALHX_GEN_ALL_CORE_FT
PAST MEDICAL HISTORY:  Claudication     DM (diabetes mellitus)     HLD (hyperlipidemia)     Hypertension

## 2020-10-13 NOTE — PROGRESS NOTE ADULT - PROBLEM SELECTOR PLAN 1
Pt found to be in severe sepsis on admission with T102.8F, HR >90, and with lactate 4.7 (source unknown). UA negative, CT Chest negative for PE or consolidations in the ED. Denies cough, runny nose, sore throat, abdominal pain, n/v/d/c, dysuria.   - s/p Zosyn 3.375g in ED  - Lactate improved to 2.0 after 2.5L NS in ED  - s/p Ceftriaxone 1g + Azithromycin 500mg on admission  - Switch to Zosyn 3.375g q6h  - MRSA negative/MSSA positive  - RVP negative  - COVID negative x2  - f/u Blood and Urine cultures  - f/u LE Dopplers

## 2020-10-13 NOTE — PROGRESS NOTE ADULT - PROBLEM SELECTOR PLAN 10
F: none  E: replete PRN  N: DASH/TLC  DVT ppx: Heparin 5000U SQ  Code: FULL CODE  Dispo: Transfer from [address] [place] [address] to [facility]

## 2020-10-13 NOTE — H&P ADULT - PROBLEM SELECTOR PLAN 4
not on insulin at home. Takes glipizide 10mg and sitagliptin 100mg  -mISS  -f/u HgbA1c ADDENDUM: followup dopplers given lower ext findings and  elevated d-dimer

## 2020-10-13 NOTE — PROGRESS NOTE ADULT - PROBLEM SELECTOR PLAN 5
Pt has known hx of DM2 and takes Glipizide 10mg with Sitagliptin 100mg at home, does not take insulin.   - HgA1C 9.0% (average glucose 212) on admission  - mISS initially ordered but pt continually refuses  - Initiate basal bolus Lantus 8U at bedtime

## 2020-10-13 NOTE — PROGRESS NOTE ADULT - PROBLEM SELECTOR PLAN 5
Pt has known hx of DM2 and takes Glipizide 10mg with Sitagliptin 100mg at home, does not take insulin.   - HgA1C 9.0% (average glucose 212) on admission  - mISS initially ordered but pt continually refuses  - Initiate basal bolus Lantus 8U at bedtime  - plan to start mealtime insulin if not discharged 10/14

## 2020-10-13 NOTE — H&P ADULT - PROBLEM SELECTOR PLAN 8
F: none  E: replete PRN  N: DASH/TLC  DVT ppx: HSQ  Dispo: 2Wo -incidental finding on CT  -recommend outpatient follow-up

## 2020-10-13 NOTE — PROGRESS NOTE ADULT - SUBJECTIVE AND OBJECTIVE BOX
OVERNIGHT EVENTS:     SUBJECTIVE / INTERVAL HPI: Patient seen and examined at bedside.     VITAL SIGNS:  Vital Signs Last 24 Hrs  T(C): 37 (13 Oct 2020 12:55), Max: 39.3 (12 Oct 2020 21:13)  T(F): 98.6 (13 Oct 2020 12:55), Max: 102.8 (12 Oct 2020 21:13)  HR: 76 (13 Oct 2020 12:55) (76 - 126)  BP: 122/55 (13 Oct 2020 12:55) (101/58 - 145/51)  BP(mean): --  RR: 18 (13 Oct 2020 12:55) (17 - 24)  SpO2: 98% (13 Oct 2020 12:55) (95% - 100%)    PHYSICAL EXAM:    General: WDWN  HEENT: NC/AT; PERRL, anicteric sclera; MMM  Neck: supple  Cardiovascular: +S1/S2, RRR  Respiratory: CTA B/L; no W/R/R  Gastrointestinal: soft, NT/ND; +BSx4  Extremities: WWP; no edema, clubbing or cyanosis  Vascular: 2+ radial, DP/PT pulses B/L  Neurological: AAOx3; no focal deficits    MEDICATIONS:  MEDICATIONS  (STANDING):  aspirin enteric coated 81 milliGRAM(s) Oral daily  atorvastatin 10 milliGRAM(s) Oral at bedtime  clopidogrel Tablet 75 milliGRAM(s) Oral daily  dextrose 5%. 1000 milliLiter(s) (50 mL/Hr) IV Continuous <Continuous>  dextrose 50% Injectable 12.5 Gram(s) IV Push once  dextrose 50% Injectable 25 Gram(s) IV Push once  dextrose 50% Injectable 25 Gram(s) IV Push once  ferrous    sulfate 325 milliGRAM(s) Oral daily  heparin   Injectable 5000 Unit(s) SubCutaneous every 8 hours  influenza  Vaccine (HIGH DOSE) 0.7 milliLiter(s) IntraMuscular once  insulin glargine Injectable (LANTUS) 8 Unit(s) SubCutaneous at bedtime  insulin lispro (HumaLOG) corrective regimen sliding scale   SubCutaneous three times a day before meals  piperacillin/tazobactam IVPB.. 3.375 Gram(s) IV Intermittent every 6 hours  tamsulosin 0.4 milliGRAM(s) Oral at bedtime    MEDICATIONS  (PRN):  dextrose 40% Gel 15 Gram(s) Oral once PRN Blood Glucose LESS THAN 70 milliGRAM(s)/deciliter  glucagon  Injectable 1 milliGRAM(s) IntraMuscular once PRN Glucose LESS THAN 70 milligrams/deciliter      ALLERGIES:  Allergies    No Known Allergies    Intolerances        LABS:                        8.6    15.38 )-----------( 193      ( 13 Oct 2020 07:17 )             28.0     10-13    139  |  110<H>  |  27<H>  ----------------------------<  207<H>  4.5   |  20<L>  |  1.47<H>    Ca    7.9<L>      13 Oct 2020 07:17  Phos  2.9     10-13  Mg     1.8     10    TPro  7.0  /  Alb  3.6  /  TBili  0.2  /  DBili  x   /  AST  27  /  ALT  15  /  AlkPhos  125<H>  10-12    PT/INR - ( 12 Oct 2020 21:43 )   PT: 12.3 sec;   INR: 1.03          PTT - ( 12 Oct 2020 21:43 )  PTT:26.6 sec  Urinalysis Basic - ( 12 Oct 2020 22:44 )    Color: Yellow / Appearance: Clear / S.015 / pH: x  Gluc: x / Ketone: NEGATIVE  / Bili: Negative / Urobili: 0.2 E.U./dL   Blood: x / Protein: NEGATIVE mg/dL / Nitrite: NEGATIVE   Leuk Esterase: NEGATIVE / RBC: x / WBC x   Sq Epi: x / Non Sq Epi: x / Bacteria: x      CAPILLARY BLOOD GLUCOSE      POCT Blood Glucose.: 187 mg/dL (13 Oct 2020 12:01)      RADIOLOGY & ADDITIONAL TESTS: Reviewed. ***Transfer Note from [address] [place] [address] (COVID [facility]) to non-COVID [facility]***    HOSPITAL COURSE:  89M (Macedonian-speaking only) with PMH of HTN, HLD, DM2, PAD s/p R angio with SFA stent 2015 (confirmed patent via angiogram 2020, on ASA/Plavix), chronic LE edema, and BPH presented late Monday evening 10/12 after 1 day of rigors, chills, and generalized weakness. Pt also endorses dull, achy lower back pain c/w his chronic back pain that he believes was aggravated by Monday's cold/rainy weather. In the ED, pt denied headache, neck pain, cough, dyspnea, abdominal pain, nausea or vomiting, diarrhea, meleana/hematochezia, dysuria, rash, urinary/fecal incontinence. He also denied sick contacts, recent travel, prior COVID infection, or trauma. On arrival, pt was febrile to 102.8F, tachycardic to , with -140 and SpO2 95-98% on RA. Labs were most notable for WBC 5, Hg 10.7, Lactate 4.7, which improved to 2.0 after 2.5L of NS, D-dimer 1943, Procalcitonin 7, and Ferritin 16. CTPE showed no PE, no GGO opacities, no infiltrates, UA was negative, RVP was negative, and COVID was negative x2. Pt was given Zosyn 3.375g in the ED but was then started on Ceftriaxone and Azithromycin on admission for empiric CAP coverage. Given that morning labs on 10/13 showed WBC 15.4 with neutrophilia and CT showed no evidence of pneumonia, Ceftriaxone/Azithromycin were discontinued and Zosyn 3.375g q6h was initiated for broader coverage.         OVERNIGHT EVENTS:     SUBJECTIVE / INTERVAL HPI: Patient seen and examined at bedside.     VITAL SIGNS:  Vital Signs Last 24 Hrs  T(C): 37 (13 Oct 2020 12:55), Max: 39.3 (12 Oct 2020 21:13)  T(F): 98.6 (13 Oct 2020 12:55), Max: 102.8 (12 Oct 2020 21:13)  HR: 76 (13 Oct 2020 12:55) (76 - 126)  BP: 122/55 (13 Oct 2020 12:55) (101/58 - 145/51)  BP(mean): --  RR: 18 (13 Oct 2020 12:55) (17 - 24)  SpO2: 98% (13 Oct 2020 12:55) (95% - 100%)    PHYSICAL EXAM:    General: WDWN  HEENT: NC/AT; PERRL, anicteric sclera; MMM  Neck: supple  Cardiovascular: +S1/S2, RRR  Respiratory: CTA B/L; no W/R/R  Gastrointestinal: soft, NT/ND; +BSx4  Extremities: WWP; no edema, clubbing or cyanosis  Vascular: 2+ radial, DP/PT pulses B/L  Neurological: AAOx3; no focal deficits    MEDICATIONS:  MEDICATIONS  (STANDING):  aspirin enteric coated 81 milliGRAM(s) Oral daily  atorvastatin 10 milliGRAM(s) Oral at bedtime  clopidogrel Tablet 75 milliGRAM(s) Oral daily  dextrose 5%. 1000 milliLiter(s) (50 mL/Hr) IV Continuous <Continuous>  dextrose 50% Injectable 12.5 Gram(s) IV Push once  dextrose 50% Injectable 25 Gram(s) IV Push once  dextrose 50% Injectable 25 Gram(s) IV Push once  ferrous    sulfate 325 milliGRAM(s) Oral daily  heparin   Injectable 5000 Unit(s) SubCutaneous every 8 hours  influenza  Vaccine (HIGH DOSE) 0.7 milliLiter(s) IntraMuscular once  insulin glargine Injectable (LANTUS) 8 Unit(s) SubCutaneous at bedtime  insulin lispro (HumaLOG) corrective regimen sliding scale   SubCutaneous three times a day before meals  piperacillin/tazobactam IVPB.. 3.375 Gram(s) IV Intermittent every 6 hours  tamsulosin 0.4 milliGRAM(s) Oral at bedtime    MEDICATIONS  (PRN):  dextrose 40% Gel 15 Gram(s) Oral once PRN Blood Glucose LESS THAN 70 milliGRAM(s)/deciliter  glucagon  Injectable 1 milliGRAM(s) IntraMuscular once PRN Glucose LESS THAN 70 milligrams/deciliter      ALLERGIES:  Allergies    No Known Allergies    Intolerances        LABS:                        8.6    15.38 )-----------( 193      ( 13 Oct 2020 07:17 )             28.0     10-13    139  |  110<H>  |  27<H>  ----------------------------<  207<H>  4.5   |  20<L>  |  1.47<H>    Ca    7.9<L>      13 Oct 2020 07:17  Phos  2.9     10-13  Mg     1.8     10    TPro  7.0  /  Alb  3.6  /  TBili  0.2  /  DBili  x   /  AST  27  /  ALT  15  /  AlkPhos  125<H>  10-12    PT/INR - ( 12 Oct 2020 21:43 )   PT: 12.3 sec;   INR: 1.03          PTT - ( 12 Oct 2020 21:43 )  PTT:26.6 sec  Urinalysis Basic - ( 12 Oct 2020 22:44 )    Color: Yellow / Appearance: Clear / S.015 / pH: x  Gluc: x / Ketone: NEGATIVE  / Bili: Negative / Urobili: 0.2 E.U./dL   Blood: x / Protein: NEGATIVE mg/dL / Nitrite: NEGATIVE   Leuk Esterase: NEGATIVE / RBC: x / WBC x   Sq Epi: x / Non Sq Epi: x / Bacteria: x      CAPILLARY BLOOD GLUCOSE      POCT Blood Glucose.: 187 mg/dL (13 Oct 2020 12:01)      RADIOLOGY & ADDITIONAL TESTS: Reviewed. ***Transfer Note from [address] [place] [address] ([facility]***    HOSPITAL COURSE:  89M (Cambodian-speaking only) with PMH of HTN, HLD, DM2, PAD s/p R angio with SFA stent 2015 (confirmed patent via angiogram 2020, on ASA/Plavix), chronic LE edema, and BPH presented late Monday evening 10/12 after 1 day of rigors, chills, and generalized weakness. Pt also endorses dull, achy lower back pain c/w his chronic back pain that he believes was aggravated by Monday's cold/rainy weather. In the ED, pt denied headache, neck pain, cough, dyspnea, abdominal pain, nausea or vomiting, diarrhea, meleana/hematochezia, dysuria, rash, urinary/fecal incontinence. He also denied sick contacts, recent travel, prior COVID infection, or trauma. On arrival, pt was febrile to 102.8F, tachycardic to , with -140 and SpO2 95-98% on RA. Labs were most notable for WBC 5, Hg 10.7, Lactate 4.7, which improved to 2.0 after 2.5L of NS, D-dimer 1943, Procalcitonin 7, and Ferritin 16. CTPE showed no PE, no GGO opacities, no infiltrates, UA was negative, RVP was negative, and COVID was negative x2. Pt was given Zosyn 3.375g in the ED but was then started on Ceftriaxone and Azithromycin on admission for empiric CAP coverage. Given that morning labs on 10/13 showed WBC 15.4 with neutrophilia and CT showed no evidence of pneumonia, Ceftriaxone/Azithromycin were discontinued and Zosyn 3.375g q6h was initiated for broader coverage (Nasal swab negative for MRSA/positive for MSSA). Further workup pending for this patient include lower extremity venous Dopplers. Pt was given his home ASA, Plavix, Flomax, and Pravastatin (Lipitor via therapeutic interchange), but Lisinopril and Amlodipine have been held since admission; Ferrous sulfate was initiated due to Ferritin 16 indicating iron deficiency.    COVID negative x2 with low suspicion for COVID discussed with Attending Physician Parisa Villeda (Epidemiology) and Bed Board. [facility]'s [facility] with bacteremia.    OVERNIGHT EVENTS: AURELIO    SUBJECTIVE / INTERVAL HPI: Patient seen and examined at bedside. On evaluation today 10/13, pt feels well and emphasizes that he has no complaints aside from persistent chills. He specifically denies fever, headache, blurry vision, photophobia, neck pain, chest pain, palpitations, abdominal pain, n/v, d/c, dysuria, hematuria, dyspnea, or cough.    VITAL SIGNS:  Vital Signs Last 24 Hrs  T(C): 37 (13 Oct 2020 12:55), Max: 39.3 (12 Oct 2020 21:13)  T(F): 98.6 (13 Oct 2020 12:55), Max: 102.8 (12 Oct 2020 21:)  HR: 76 (13 Oct 2020 12:55) (76 - 126)  BP: 122/55 (13 Oct 2020 12:55) (101/58 - 145/51)  BP(mean): --  RR: 18 (13 Oct 2020 12:55) (17 - 24)  SpO2: 98% (13 Oct 2020 12:55) (95% - 100%)    PHYSICAL EXAM:    General: WDWN  HEENT: NC/AT; PERRL, anicteric sclera; MMM  Neck: supple, no stiffness  Cardiovascular: +S1/S2, RRR  Respiratory: CTA B/L; no W/R/R  Gastrointestinal: soft, mildly distended but non-tender, +BSx4  Extremities: WWP; +1 pitting edema b/l to mid-shin  Vascular: 2+ radial, DP/PT pulses B/L  Neurological: AAOx3; no focal deficits    MEDICATIONS:  MEDICATIONS  (STANDING):  aspirin enteric coated 81 milliGRAM(s) Oral daily  atorvastatin 10 milliGRAM(s) Oral at bedtime  clopidogrel Tablet 75 milliGRAM(s) Oral daily  dextrose 5%. 1000 milliLiter(s) (50 mL/Hr) IV Continuous <Continuous>  dextrose 50% Injectable 12.5 Gram(s) IV Push once  dextrose 50% Injectable 25 Gram(s) IV Push once  dextrose 50% Injectable 25 Gram(s) IV Push once  ferrous    sulfate 325 milliGRAM(s) Oral daily  heparin   Injectable 5000 Unit(s) SubCutaneous every 8 hours  influenza  Vaccine (HIGH DOSE) 0.7 milliLiter(s) IntraMuscular once  insulin glargine Injectable (LANTUS) 8 Unit(s) SubCutaneous at bedtime  insulin lispro (HumaLOG) corrective regimen sliding scale   SubCutaneous three times a day before meals  piperacillin/tazobactam IVPB.. 3.375 Gram(s) IV Intermittent every 6 hours  tamsulosin 0.4 milliGRAM(s) Oral at bedtime    MEDICATIONS  (PRN):  dextrose 40% Gel 15 Gram(s) Oral once PRN Blood Glucose LESS THAN 70 milliGRAM(s)/deciliter  glucagon  Injectable 1 milliGRAM(s) IntraMuscular once PRN Glucose LESS THAN 70 milligrams/deciliter      ALLERGIES:  Allergies    No Known Allergies    Intolerances        LABS:                        8.6    15.38 )-----------( 193      ( 13 Oct 2020 07:17 )             28.0     10-13    139  |  110<H>  |  27<H>  ----------------------------<  207<H>  4.5   |  20<L>  |  1.47<H>    Ca    7.9<L>      13 Oct 2020 07:17  Phos  2.9     10-13  Mg     1.8     10-    TPro  7.0  /  Alb  3.6  /  TBili  0.2  /  DBili  x   /  AST  27  /  ALT  15  /  AlkPhos  125<H>  10-12    PT/INR - ( 12 Oct 2020 21:43 )   PT: 12.3 sec;   INR: 1.03          PTT - ( 12 Oct 2020 21:43 )  PTT:26.6 sec  Urinalysis Basic - ( 12 Oct 2020 22:44 )    Color: Yellow / Appearance: Clear / S.015 / pH: x  Gluc: x / Ketone: NEGATIVE  / Bili: Negative / Urobili: 0.2 E.U./dL   Blood: x / Protein: NEGATIVE mg/dL / Nitrite: NEGATIVE   Leuk Esterase: NEGATIVE / RBC: x / WBC x   Sq Epi: x / Non Sq Epi: x / Bacteria: x      CAPILLARY BLOOD GLUCOSE      POCT Blood Glucose.: 187 mg/dL (13 Oct 2020 12:01)      RADIOLOGY & ADDITIONAL TESTS: Reviewed.

## 2020-10-13 NOTE — PROGRESS NOTE ADULT - ASSESSMENT
89M with PMH of HTN, DM2, HLD, PAD s/p R angio/sfa stent (8/2015) w/ confirmed patent sfa stent on angiogram (6/2020) on asa/plavix, chronic LE edema, BPH, presenting with rigors and generalized weakness for 1 day, found to have severe sepsis with no known source and 2 negative COVID PCRs, being transferred to non-COVID [facility] for further workup of his severe sepsis.

## 2020-10-13 NOTE — PROGRESS NOTE ADULT - SUBJECTIVE AND OBJECTIVE BOX
HOSPITAL COURSE:  89M ([place]ian-speaking only) with PMH of HTN, HLD, DM2, PAD s/p R angio with SFA stent 2015 (confirmed patent via angiogram 2020, on ASA/Plavix), chronic LE edema, and BPH presented late Monday evening 10/12 after 1 day of rigors, chills, and generalized weakness. Pt also endorses dull, achy lower back pain c/w his chronic back pain that he believes was aggravated by Monday's cold/rainy weather. In the ED, pt denied headache, neck pain, cough, dyspnea, abdominal pain, nausea or vomiting, diarrhea, meleana/hematochezia, dysuria, rash, urinary/fecal incontinence. He also denied sick contacts, recent travel, prior COVID infection, or trauma. On arrival, pt was febrile to 102.8F, tachycardic to , with -140 and SpO2 95-98% on RA. Labs were most notable for WBC 5, Hg 10.7, Lactate 4.7, which improved to 2.0 after 2.5L of NS, D-dimer 1943, Procalcitonin 7, and Ferritin 16. CTPE showed no PE, no GGO opacities, no infiltrates, UA was negative, RVP was negative, and COVID was negative x2. Pt was given Zosyn 3.375g in the ED but was then started on Ceftriaxone and Azithromycin on admission for empiric CAP coverage. Given that morning labs on 10/13 showed WBC 15.4 with neutrophilia and CT showed no evidence of pneumonia, Ceftriaxone/Azithromycin were discontinued and Zosyn 3.375g q6h was initiated for broader coverage (Nasal swab negative for MRSA/positive for MSSA). Further workup pending for this patient include lower extremity venous Dopplers. Pt was given his home ASA, Plavix, Flomax, and Pravastatin (Lipitor via therapeutic interchange), but Lisinopril and Amlodipine have been held since admission; Ferrous sulfate was initiated due to Ferritin 16 indicating iron deficiency.    INTERVAL HPI/OVERNIGHT EVENTS:  Patient was seen and examined at bedside. As per nurse and patient, no o/n events, patient resting comfortably, pain free with no fevers/chills, eating normally. Patient denies: fever, chills, dizziness, weakness, HA, Changes in vision, CP, palpitations, SOB, cough, N/V/D/C, dysuria, LE edema. ROS otherwise negative.    VITAL SIGNS:  T(F): 98.1 (10-13-20 @ 17:19)  HR: 76 (10-13-20 @ 17:19)  BP: 128/69 (10-13-20 @ 17:19)  RR: 18 (10-13-20 @ 17:19)  SpO2: 98% (10-13-20 @ 17:19)      PHYSICAL EXAM:    Constitutional: WDWN resting comfortably in bed; NAD  HEENT: NC/AT, PER, anicteric sclera, no nasal discharge; uvula midline, no oropharyngeal erythema or exudates; MMM  Neck: supple; no JVD or thyromegaly  Respiratory: CTA B/L; no W/R/R, no retractions  Cardiac: +S1/S2; RRR; no M/R/G  Gastrointestinal: soft, NT/ND; no rebound or guarding  Rectal exam: brown stool, no prostate enlargement or tenderness  Back: spine midline, no bony tenderness or step-offs; no CVAT B/L  Extremities: WWP, no clubbing or cyanosis; no peripheral edema  Musculoskeletal: NROM x4; no joint swelling, tenderness or erythema  Vascular: 2+ radial, DP/PT pulses B/L  Dermatologic: skin warm, dry and intact; no rashes, wounds, or scars  Neurologic: AAOx3; CNII-XII intact; face symmetric; 5/5 strength all distal extremities  Psychiatric: affect and characteristics of appearance, verbalizations, behaviors are appropriate    MEDICATIONS  (STANDING):  aspirin enteric coated 81 milliGRAM(s) Oral daily  atorvastatin 10 milliGRAM(s) Oral at bedtime  clopidogrel Tablet 75 milliGRAM(s) Oral daily  dextrose 5%. 1000 milliLiter(s) (50 mL/Hr) IV Continuous <Continuous>  dextrose 50% Injectable 12.5 Gram(s) IV Push once  dextrose 50% Injectable 25 Gram(s) IV Push once  dextrose 50% Injectable 25 Gram(s) IV Push once  ferrous    sulfate 325 milliGRAM(s) Oral daily  heparin   Injectable 5000 Unit(s) SubCutaneous every 8 hours  influenza  Vaccine (HIGH DOSE) 0.7 milliLiter(s) IntraMuscular once  insulin glargine Injectable (LANTUS) 8 Unit(s) SubCutaneous at bedtime  insulin lispro (HumaLOG) corrective regimen sliding scale   SubCutaneous three times a day before meals  piperacillin/tazobactam IVPB.. 3.375 Gram(s) IV Intermittent every 6 hours  tamsulosin 0.4 milliGRAM(s) Oral at bedtime    MEDICATIONS  (PRN):  dextrose 40% Gel 15 Gram(s) Oral once PRN Blood Glucose LESS THAN 70 milliGRAM(s)/deciliter  glucagon  Injectable 1 milliGRAM(s) IntraMuscular once PRN Glucose LESS THAN 70 milligrams/deciliter      Allergies    No Known Allergies    Intolerances        LABS:                        8.6    15.38 )-----------( 193      ( 13 Oct 2020 07:17 )             28.0     10-13    139  |  110<H>  |  27<H>  ----------------------------<  207<H>  4.5   |  20<L>  |  1.47<H>    Ca    7.9<L>      13 Oct 2020 07:17  Phos  2.9     10-  Mg     1.8     10-13    TPro  7.0  /  Alb  3.6  /  TBili  0.2  /  DBili  x   /  AST  27  /  ALT  15  /  AlkPhos  125<H>  10-12    PT/INR - ( 12 Oct 2020 21:43 )   PT: 12.3 sec;   INR: 1.03          PTT - ( 12 Oct 2020 21:43 )  PTT:26.6 sec  Urinalysis Basic - ( 12 Oct 2020 22:44 )    Color: Yellow / Appearance: Clear / S.015 / pH: x  Gluc: x / Ketone: NEGATIVE  / Bili: Negative / Urobili: 0.2 E.U./dL   Blood: x / Protein: NEGATIVE mg/dL / Nitrite: NEGATIVE   Leuk Esterase: NEGATIVE / RBC: x / WBC x   Sq Epi: x / Non Sq Epi: x / Bacteria: x        RADIOLOGY & ADDITIONAL TESTS:  Reviewed

## 2020-10-13 NOTE — H&P ADULT - PROBLEM SELECTOR PLAN 1
Pt found to be in severe sepsis on admission: Tmax 102.8, HR 120s with lactate 4.7, with unknown source. UA- CT Chest negative for PE or consolidations, pt with no URI symptoms, no abdominal pain, N/V/D/C, dysuria. Received 1 dose of Zosyn in ED and 2.5L NS.  -f/u BCx, UCx, RVP Pt found to be in severe sepsis on admission: Tmax 102.8, HR 120s with lactate 4.7, with unknown source. UA- CT Chest negative for PE or consolidations, pt with no URI symptoms, no abdominal pain, N/V/D/C, dysuria. Received 1 dose of Zosyn in ED and 2.5L NS.  -f/u BCx, UCx, RVP  -f/u 2nd COVID swab  -will continue antibiotics, and keep pt under isolation precautions until source is identified Pt found to be in severe sepsis on admission: Tmax 102.8, HR 120s with lactate 4.7, with unknown source. UA- CT Chest negative for PE or consolidations, pt with no URI symptoms, no abdominal pain, N/V/D/C, dysuria. Received 1 dose of Zosyn in ED and 2.5L NS.  -f/u BCx, UCx, RVP  -f/u 2nd COVID swab  -will continue Zosyn 3.375g q8h, and keep pt under isolation precautions until source is identified  -f/u LE Dopplers Pt found to be in severe sepsis on admission: Tmax 102.8, HR 120s with lactate 4.7, with unknown source. UA- CT Chest negative for PE or consolidations, pt with no URI symptoms, no abdominal pain, N/V/D/C, dysuria. Received 1 dose of Zosyn in ED and 2.5L NS.  -f/u BCx, UCx, RVP  -f/u 2nd COVID swab  -will switch to Ceftriaxone 1g and Azithromycin 500mg for CAP treatment, and keep pt under isolation precautions until source is identified/confirmed  -f/u LE Dopplers

## 2020-10-13 NOTE — H&P ADULT - PROBLEM SELECTOR PLAN 6
F: none  E: replete PRN  N: DASH/TLC  DVT ppx: HSQ  Dispo: 2Wo -incidental finding on CT  -recommend outpatient follow-up -c/w tamsulosin 0.4mg -takes pravastatin 20mg at home  -per therapeutic interchange, continuing atorvastatin 10mg qhs

## 2020-10-13 NOTE — H&P ADULT - NSICDXPASTSURGICALHX_GEN_ALL_CORE_FT
PAST SURGICAL HISTORY:  Cataracts, both eyes     H/O hernia repair     Presence of stent in artery femoral artery- left

## 2020-10-13 NOTE — PROGRESS NOTE ADULT - PROBLEM SELECTOR PLAN 6
Pt has known hx HLD and takes pravastatin 20mg at home  - c/w Atorvastatin 10mg qhs via therapeutic interchange

## 2020-10-13 NOTE — PROGRESS NOTE ADULT - PROBLEM SELECTOR PLAN 3
Pt found to have an elevated Cr of 1.47. Baseline is unknown but prior records show Cr as high as 1.6. Denies changes in urination, hematuria, dysuria.  - Trend daily BMP

## 2020-10-13 NOTE — H&P ADULT - PROBLEM SELECTOR PROBLEM 5
Nutrition, metabolism, and development symptoms BPH (benign prostatic hyperplasia) HLD (hyperlipidemia) DM (diabetes mellitus)

## 2020-10-13 NOTE — PROGRESS NOTE ADULT - PROBLEM SELECTOR PLAN 10
F: none  E: replete PRN  N: DASH/TLC  DVT ppx: Heparin 5000U SQ  Code: FULL CODE  Dispo: Transfer from [address] [place] [address] to [facility]

## 2020-10-13 NOTE — H&P ADULT - PROBLEM SELECTOR PLAN 2
Pt initially complaining of weakness on presentation. Found to be in severe sepsis on arrival, s/p 1 dose of Zosyn, 2.5L NS. Pt says he feels better Pt with history of HTN, home meds: amlodipine 10mg, lisinopril 10mg  -Pt currently septic, will hold antihypertensives. Pt with history of HTN, home meds: amlodipine 10mg, lisinopril 10mg  -Pt currently septic, will hold antihypertensives.    #CKD3  Pt with elevated Cr 1.47, unknown baseline  -continue to monitor Cr

## 2020-10-13 NOTE — PROGRESS NOTE ADULT - PROBLEM SELECTOR PLAN 1
Pt found to be in severe sepsis on admission with T102.8F, HR >90, and with lactate 4.7 (source unknown). UA negative, CT Chest negative for PE or consolidations in the ED. Denies cough, runny nose, sore throat, abdominal pain, n/v/d/c, dysuria.   - CT/PE negative for PE, consolidation  - will obtain CT A/P with contrast  - c/w Zosyn 3.375g q6 hours  - Lactate improved to 2.0 after 2.5L NS in ED  - s/p Azithromycin 500mg x1 prior to Zosyn  - MRSA negative/MSSA positive  - RVP negative  - COVID negative x2  - f/u Blood and Urine cultures  - f/u LE Dopplers  - f/u AM CBC + diff, CMP, Procalcitonin, ESR, CRP

## 2020-10-13 NOTE — PROGRESS NOTE ADULT - ATTENDING COMMENTS
Patient discussed with resident team and plan of care reviewed. I have personally reviewed all pertinent labs and imaging and performed an independent history and physical. Resident note personally reviewed, and I agree with above resident note with the following additions:     89YOM with history of non-insulin dependent DM2 (a1c 9% this admission), PAD s/p R SFA stent, HTN, HLD admitted for severe sepsis of unknown origin.    Problem list:  Severe sepsis (fever, leukocytosis, tachycardia, lactic acidosis)  CKD Stage 3  Non-insulin dependent DM2, poorly controlled  Anemia  PAD  Essential HTN  Hyperlipidemia    Infectious workup thus far negative – UA, CTA chest, RVP, COVID19, full skin exam all unrevealing. Unclear etiology at this time and he has no new focal symptoms – his back pain appears to be chronic and unchanged, and his exam is unremarkable. Follow up blood cultures. Broadening abx today given that his WBC increased, de-escalate as able. Consider spinal imaging if no other sources found. Diabetes is poorly controlled with a1c 9% and blood sugars in 200s, will use basal/bolus regimen while in house.    My suspicion for COVID19 causing his sepsis is low, especially with normal CT chest and COVID19 negative x2 – can remove from COVID unit.

## 2020-10-13 NOTE — H&P ADULT - ATTENDING COMMENTS
[id number]    patient seen and examined overnight     reviewed pertinent data , h&p    Elderly in NAD , Mmm, no photophobia,  Audible runny nose , no sinus tenderness bl  Distant heart sounds   Lungs cta bl   Abd obese /soft/nt/nd  RLEXT larger than LLEXT ,  reports pain on palpation of lower ext bl     1. severe sepsis, source unclear at time of admission, possible LLL infiltrate  noted on CT chest imaging, started on ceftriaxone / azithromycin; followup 2nd covid swab. followup ctxs.    2. followup lower ext dopplers     rest of  plan as above, with noted addenda

## 2020-10-13 NOTE — H&P ADULT - PROBLEM SELECTOR PROBLEM 3
Hypertension HLD (hyperlipidemia) Stage 3 chronic kidney disease, unspecified whether stage 3a or 3b CKD

## 2020-10-13 NOTE — PROGRESS NOTE ADULT - ASSESSMENT
89M with PMH of HTN, DM2, HLD, PAD s/p R angio/sfa stent (8/2015) w/ confirmed patent sfa stent on angiogram (6/2020) on asa/plavix, chronic LE edema, BPH, presenting with rigors and generalized weakness for 1 day, found to have severe sepsis with no known source and 2 negative COVID PCRs, being transferred to non-COVID [facility] for further workup of his severe sepsis.

## 2020-10-13 NOTE — H&P ADULT - PROBLEM SELECTOR PLAN 5
-c/w tamsulosin 0.4mg -takes pravastatin 20mg at home  -per therapeutic interchange, continuing atorvastatin 10mg qhs not on insulin at home. Takes glipizide 10mg and sitagliptin 100mg  -mISS  -f/u HgbA1c

## 2020-10-13 NOTE — H&P ADULT - PROBLEM SELECTOR PROBLEM 8
Nutrition, metabolism, and development symptoms Renal cyst Thyroid nodule incidentally noted on imaging study

## 2020-10-13 NOTE — PROGRESS NOTE ADULT - ATTENDING COMMENTS
Patient admitted early this morning for COVID rule out.   Stepped down from COVID floor when COVID swab returned negative.   Was febrile on admission, with shaking chills. Admission procal of 7.00; Shaking chills ?possibly bacteremic from occult infection  Infectious workup thus far negative – UA, CTA chest, RVP, COVID19, full skin exam all unrevealing  ROBERT at bedside this evening shows brown stool   History unrevealing except for shaking chills and "back pain" which he says has improved since admission. Patient is otherwise well-appearing, was asking housestaff about discharge earlier today.   Still no good source for leukocytosis and procal elevation. Intra-abdominal abscess, intramuscular abscess, infected hematoma are all possibilities  Repeat procal, CBC, ESR, CRP.  CT abdomen pelvis with IV contrast   Continue abx  Reassess in morning    *** evening update  chart checked in late evening. Procal much higher at 78. from 7 the day before.   spoke with overnight housestaff . will try to expedite imaging.   Make sure physical exam, mental status unchanged from earlier report. Family history, social history, past medical history, and home medications from admission H&P were reviewed and are unchanged except as noted above.    Patient admitted early this morning for COVID rule out.   Stepped down from COVID floor when COVID swab returned negative.   Was febrile on admission, with shaking chills. Admission procal of 7.00; Shaking chills ?possibly bacteremic from occult infection  Infectious workup thus far negative – UA, CTA chest, RVP, COVID19, full skin exam all unrevealing  ROBRET at bedside this evening shows brown stool   History unrevealing except for shaking chills and "back pain" which he says has improved since admission. Patient is otherwise well-appearing, was asking housestaff about discharge earlier today.   Still no good source for leukocytosis and procal elevation. Intra-abdominal abscess, intramuscular abscess, infected hematoma are all possibilities  Repeat procal, CBC, ESR, CRP.  CT abdomen pelvis with IV contrast   Continue abx  Reassess in morning    *** evening update  chart checked in late evening. Procal much higher at 78. from 7 on admission.   spoke with overnight housestaff . will try to expedite imaging.   Make sure physical exam, mental status unchanged from earlier report.

## 2020-10-13 NOTE — H&P ADULT - NSHPSOCIALHISTORY_GEN_ALL_CORE
at baseline, highly functional, a+ox3, +ADLs, ambulates w/ cane, lives at home w/ wife.  Never smoker, social drinker only on weekends, highly functional, AAOx3, independent with ADLs, ambulates w/ cane, lives at home w/ wife.  Never smoker, social drinker only on weekends with friends (3 beers max), no illicit drug use

## 2020-10-13 NOTE — H&P ADULT - NSHPPHYSICALEXAM_GEN_ALL_CORE
VITAL SIGNS:  ICU Vital Signs Last 24 Hrs  T(C): 37.3 (13 Oct 2020 01:42), Max: 39.3 (12 Oct 2020 21:13)  T(F): 99.1 (13 Oct 2020 01:42), Max: 102.8 (12 Oct 2020 21:13)  HR: 96 (13 Oct 2020 01:42) (92 - 126)  BP: 101/58 (13 Oct 2020 01:42) (101/58 - 145/51)  BP(mean): --  ABP: --  ABP(mean): --  RR: 18 (13 Oct 2020 01:42) (18 - 24)  SpO2: 98% (13 Oct 2020 01:42) (95% - 100%)    CAPILLARY BLOOD GLUCOSE          PHYSICAL EXAM:  Constitutional: resting comfortably in bed, NAD  HEENT: NC/AT; PERRL, anicteric sclera; no oropharyngeal erythema or exudates; MMM  Neck: supple, no appreciable JVD  Respiratory: CTA B/L, no W/R/R; respirations appear non-labored, conversive in full sentences  Cardiovascular: +S1/S2, RRR  Gastrointestinal: abdomen soft, NT/ND  Extremities: WWP; no clubbing, cyanosis or edema  Vascular: 2+ radial, femoral, and DP/PT pulses B/L  Dermatologic: skin normal color and turgor; no visible rashes  Neurological: VITAL SIGNS:  ICU Vital Signs Last 24 Hrs  T(C): 37.3 (13 Oct 2020 01:42), Max: 39.3 (12 Oct 2020 21:13)  T(F): 99.1 (13 Oct 2020 01:42), Max: 102.8 (12 Oct 2020 21:13)  HR: 96 (13 Oct 2020 01:42) (92 - 126)  BP: 101/58 (13 Oct 2020 01:42) (101/58 - 145/51)  BP(mean): --  ABP: --  ABP(mean): --  RR: 18 (13 Oct 2020 01:42) (18 - 24)  SpO2: 98% (13 Oct 2020 01:42) (95% - 100%)    CAPILLARY BLOOD GLUCOSE          PHYSICAL EXAM:  Constitutional: resting comfortably in bed, NAD, appears younger than stated age  HEENT: NC/AT; PERRL, anicteric sclera; no oropharyngeal erythema or exudates; MMM  Neck: supple, no appreciable JVD  Respiratory: upper lung fields CTA B/L, no W/R/R; few crackles at R lung base, respirations appear non-labored, conversive in full sentences  Cardiovascular: +S1/S2, RRR, no m/r/g  Gastrointestinal: obese abdomen, NT, tympanitic to percussion  Extremities: WWP; no clubbing, cyanosis, nonpitting edema b/l knees to feet  Vascular: 2+ radial, DP/PT pulses B/L  Dermatologic: skin normal color and turgor; no visible rashes or skin tears  Neurological: AAOx3, cranial nerves grossly intact VITAL SIGNS:  ICU Vital Signs Last 24 Hrs  T(C): 37.3 (13 Oct 2020 01:42), Max: 39.3 (12 Oct 2020 21:13)  T(F): 99.1 (13 Oct 2020 01:42), Max: 102.8 (12 Oct 2020 21:13)  HR: 96 (13 Oct 2020 01:42) (92 - 126)  BP: 101/58 (13 Oct 2020 01:42) (101/58 - 145/51)  BP(mean): --  ABP: --  ABP(mean): --  RR: 18 (13 Oct 2020 01:42) (18 - 24)  SpO2: 98% (13 Oct 2020 01:42) (95% - 100%)    CAPILLARY BLOOD GLUCOSE          PHYSICAL EXAM:  Constitutional: resting comfortably in bed, NAD, appears younger than stated age  HEENT: NC/AT; PERRL, anicteric sclera; no oropharyngeal erythema or exudates; MMM  Neck: supple, no appreciable JVD  Respiratory: upper lung fields CTA B/L, no W/R/R; few crackles at R lung base, respirations appear non-labored, conversive in full sentences  Cardiovascular: +S1/S2, RRR, no m/r/g  Gastrointestinal: obese abdomen, NT, tympanitic to percussion  Extremities: WWP; no clubbing, cyanosis, nonpitting edema b/l knees to feet R>L  Vascular: 2+ radial, DP/PT pulses B/L  Dermatologic: skin normal color and turgor; no visible rashes or skin tears  Neurological: AAOx3, cranial nerves grossly intact

## 2020-10-13 NOTE — PROGRESS NOTE ADULT - PROBLEM SELECTOR PLAN 4
Pt found to have D-dimer of 1943 on admission with b/l LE +1 pitting edema on exam. Non-tender, non-erythematous shins b/l.   - f/u LE Dopplers to r/o DVT

## 2020-10-14 LAB
ALBUMIN SERPL ELPH-MCNC: 2.9 G/DL — LOW (ref 3.3–5)
ALP SERPL-CCNC: 118 U/L — SIGNIFICANT CHANGE UP (ref 40–120)
ALT FLD-CCNC: 42 U/L — SIGNIFICANT CHANGE UP (ref 10–45)
ANION GAP SERPL CALC-SCNC: 7 MMOL/L — SIGNIFICANT CHANGE UP (ref 5–17)
ANISOCYTOSIS BLD QL: SLIGHT — SIGNIFICANT CHANGE UP
AST SERPL-CCNC: 53 U/L — HIGH (ref 10–40)
BASOPHILS # BLD AUTO: 0 K/UL — SIGNIFICANT CHANGE UP (ref 0–0.2)
BASOPHILS NFR BLD AUTO: 0 % — SIGNIFICANT CHANGE UP (ref 0–2)
BILIRUB SERPL-MCNC: 0.4 MG/DL — SIGNIFICANT CHANGE UP (ref 0.2–1.2)
BUN SERPL-MCNC: 23 MG/DL — SIGNIFICANT CHANGE UP (ref 7–23)
CALCIUM SERPL-MCNC: 7.9 MG/DL — LOW (ref 8.4–10.5)
CHLORIDE SERPL-SCNC: 109 MMOL/L — HIGH (ref 96–108)
CO2 SERPL-SCNC: 23 MMOL/L — SIGNIFICANT CHANGE UP (ref 22–31)
CREAT SERPL-MCNC: 1.58 MG/DL — HIGH (ref 0.5–1.3)
CRP SERPL-MCNC: 5.92 MG/DL — HIGH (ref 0–0.4)
CULTURE RESULTS: SIGNIFICANT CHANGE UP
D DIMER BLD IA.RAPID-MCNC: 1017 NG/ML DDU — HIGH
E COLI DNA BLD POS QL NAA+NON-PROBE: SIGNIFICANT CHANGE UP
EOSINOPHIL # BLD AUTO: 0.22 K/UL — SIGNIFICANT CHANGE UP (ref 0–0.5)
EOSINOPHIL NFR BLD AUTO: 1.7 % — SIGNIFICANT CHANGE UP (ref 0–6)
ERYTHROCYTE [SEDIMENTATION RATE] IN BLOOD: 45 MM/HR — HIGH
FERRITIN SERPL-MCNC: 40 NG/ML — SIGNIFICANT CHANGE UP (ref 30–400)
GGT SERPL-CCNC: 283 U/L — HIGH (ref 9–50)
GIANT PLATELETS BLD QL SMEAR: PRESENT — SIGNIFICANT CHANGE UP
GLUCOSE BLDC GLUCOMTR-MCNC: 141 MG/DL — HIGH (ref 70–99)
GLUCOSE BLDC GLUCOMTR-MCNC: 189 MG/DL — HIGH (ref 70–99)
GLUCOSE BLDC GLUCOMTR-MCNC: 212 MG/DL — HIGH (ref 70–99)
GLUCOSE BLDC GLUCOMTR-MCNC: 250 MG/DL — HIGH (ref 70–99)
GLUCOSE SERPL-MCNC: 142 MG/DL — HIGH (ref 70–99)
GRAM STN FLD: SIGNIFICANT CHANGE UP
GRAM STN FLD: SIGNIFICANT CHANGE UP
HAV IGM SER-ACNC: SIGNIFICANT CHANGE UP
HBV CORE AB SER-ACNC: SIGNIFICANT CHANGE UP
HBV CORE IGM SER-ACNC: SIGNIFICANT CHANGE UP
HBV SURFACE AB SER-ACNC: SIGNIFICANT CHANGE UP
HBV SURFACE AG SER-ACNC: SIGNIFICANT CHANGE UP
HCT VFR BLD CALC: 29.2 % — LOW (ref 39–50)
HCV AB S/CO SERPL IA: 0.15 S/CO — SIGNIFICANT CHANGE UP
HCV AB SERPL-IMP: SIGNIFICANT CHANGE UP
HGB BLD-MCNC: 9 G/DL — LOW (ref 13–17)
HIV 1+2 AB+HIV1 P24 AG SERPL QL IA: SIGNIFICANT CHANGE UP
HYPOCHROMIA BLD QL: SLIGHT — SIGNIFICANT CHANGE UP
IRON SATN MFR SERPL: 16 % — SIGNIFICANT CHANGE UP (ref 16–55)
IRON SATN MFR SERPL: 41 UG/DL — LOW (ref 45–165)
LYMPHOCYTES # BLD AUTO: 0.55 K/UL — LOW (ref 1–3.3)
LYMPHOCYTES # BLD AUTO: 4.3 % — LOW (ref 13–44)
MACROCYTES BLD QL: SLIGHT — SIGNIFICANT CHANGE UP
MAGNESIUM SERPL-MCNC: 2.3 MG/DL — SIGNIFICANT CHANGE UP (ref 1.6–2.6)
MANUAL SMEAR VERIFICATION: SIGNIFICANT CHANGE UP
MCHC RBC-ENTMCNC: 28.8 PG — SIGNIFICANT CHANGE UP (ref 27–34)
MCHC RBC-ENTMCNC: 30.8 GM/DL — LOW (ref 32–36)
MCV RBC AUTO: 93.6 FL — SIGNIFICANT CHANGE UP (ref 80–100)
METHOD TYPE: SIGNIFICANT CHANGE UP
MONOCYTES # BLD AUTO: 0.44 K/UL — SIGNIFICANT CHANGE UP (ref 0–0.9)
MONOCYTES NFR BLD AUTO: 3.5 % — SIGNIFICANT CHANGE UP (ref 2–14)
NEUTROPHILS # BLD AUTO: 11.5 K/UL — HIGH (ref 1.8–7.4)
NEUTROPHILS NFR BLD AUTO: 89.6 % — HIGH (ref 43–77)
NEUTS BAND # BLD: 0.9 % — SIGNIFICANT CHANGE UP (ref 0–8)
OVALOCYTES BLD QL SMEAR: SLIGHT — SIGNIFICANT CHANGE UP
PHOSPHATE SERPL-MCNC: 2.9 MG/DL — SIGNIFICANT CHANGE UP (ref 2.5–4.5)
PLAT MORPH BLD: ABNORMAL
PLATELET # BLD AUTO: 186 K/UL — SIGNIFICANT CHANGE UP (ref 150–400)
POLYCHROMASIA BLD QL SMEAR: SLIGHT — SIGNIFICANT CHANGE UP
POTASSIUM SERPL-MCNC: 4.6 MMOL/L — SIGNIFICANT CHANGE UP (ref 3.5–5.3)
POTASSIUM SERPL-SCNC: 4.6 MMOL/L — SIGNIFICANT CHANGE UP (ref 3.5–5.3)
PROCALCITONIN SERPL-MCNC: 71.24 NG/ML — HIGH (ref 0.02–0.1)
PROT SERPL-MCNC: 5.7 G/DL — LOW (ref 6–8.3)
RBC # BLD: 3.12 M/UL — LOW (ref 4.2–5.8)
RBC # FLD: 14.1 % — SIGNIFICANT CHANGE UP (ref 10.3–14.5)
RBC BLD AUTO: ABNORMAL
SODIUM SERPL-SCNC: 139 MMOL/L — SIGNIFICANT CHANGE UP (ref 135–145)
SPECIMEN SOURCE: SIGNIFICANT CHANGE UP
TIBC SERPL-MCNC: 257 UG/DL — SIGNIFICANT CHANGE UP (ref 220–430)
UIBC SERPL-MCNC: 216 UG/DL — SIGNIFICANT CHANGE UP (ref 110–370)
WBC # BLD: 12.71 K/UL — HIGH (ref 3.8–10.5)
WBC # FLD AUTO: 12.71 K/UL — HIGH (ref 3.8–10.5)

## 2020-10-14 PROCEDURE — 74177 CT ABD & PELVIS W/CONTRAST: CPT | Mod: 26

## 2020-10-14 PROCEDURE — 99233 SBSQ HOSP IP/OBS HIGH 50: CPT | Mod: GC

## 2020-10-14 PROCEDURE — 93970 EXTREMITY STUDY: CPT | Mod: 26

## 2020-10-14 RX ORDER — ENOXAPARIN SODIUM 100 MG/ML
80 INJECTION SUBCUTANEOUS EVERY 24 HOURS
Refills: 0 | Status: DISCONTINUED | OUTPATIENT
Start: 2020-10-14 | End: 2020-10-15

## 2020-10-14 RX ADMIN — Medication 4: at 22:25

## 2020-10-14 RX ADMIN — PIPERACILLIN AND TAZOBACTAM 200 GRAM(S): 4; .5 INJECTION, POWDER, LYOPHILIZED, FOR SOLUTION INTRAVENOUS at 06:46

## 2020-10-14 RX ADMIN — HEPARIN SODIUM 5000 UNIT(S): 5000 INJECTION INTRAVENOUS; SUBCUTANEOUS at 06:46

## 2020-10-14 RX ADMIN — Medication 4: at 12:21

## 2020-10-14 RX ADMIN — ENOXAPARIN SODIUM 80 MILLIGRAM(S): 100 INJECTION SUBCUTANEOUS at 18:15

## 2020-10-14 RX ADMIN — Medication 2: at 18:15

## 2020-10-14 RX ADMIN — Medication 325 MILLIGRAM(S): at 12:22

## 2020-10-14 RX ADMIN — CLOPIDOGREL BISULFATE 75 MILLIGRAM(S): 75 TABLET, FILM COATED ORAL at 12:22

## 2020-10-14 RX ADMIN — TAMSULOSIN HYDROCHLORIDE 0.4 MILLIGRAM(S): 0.4 CAPSULE ORAL at 22:18

## 2020-10-14 RX ADMIN — PIPERACILLIN AND TAZOBACTAM 200 GRAM(S): 4; .5 INJECTION, POWDER, LYOPHILIZED, FOR SOLUTION INTRAVENOUS at 00:11

## 2020-10-14 RX ADMIN — PIPERACILLIN AND TAZOBACTAM 200 GRAM(S): 4; .5 INJECTION, POWDER, LYOPHILIZED, FOR SOLUTION INTRAVENOUS at 12:29

## 2020-10-14 RX ADMIN — Medication 81 MILLIGRAM(S): at 12:22

## 2020-10-14 RX ADMIN — HEPARIN SODIUM 5000 UNIT(S): 5000 INJECTION INTRAVENOUS; SUBCUTANEOUS at 14:09

## 2020-10-14 RX ADMIN — PIPERACILLIN AND TAZOBACTAM 200 GRAM(S): 4; .5 INJECTION, POWDER, LYOPHILIZED, FOR SOLUTION INTRAVENOUS at 23:04

## 2020-10-14 RX ADMIN — PIPERACILLIN AND TAZOBACTAM 200 GRAM(S): 4; .5 INJECTION, POWDER, LYOPHILIZED, FOR SOLUTION INTRAVENOUS at 18:15

## 2020-10-14 RX ADMIN — INSULIN GLARGINE 8 UNIT(S): 100 INJECTION, SOLUTION SUBCUTANEOUS at 22:18

## 2020-10-14 RX ADMIN — ATORVASTATIN CALCIUM 10 MILLIGRAM(S): 80 TABLET, FILM COATED ORAL at 22:18

## 2020-10-14 NOTE — PROGRESS NOTE ADULT - PROBLEM SELECTOR PLAN 5
Problem: DM (diabetes mellitus).  Plan: Pt has known hx of DM2 and takes Glipizide 10mg with Sitagliptin 100mg at home, does not take insulin.   - HgA1C 9.0% (average glucose 212) on admission  - mISS initially ordered but pt continually refuses  - Initiate basal bolus Lantus 8U at bedtime  - plan to start mealtime insulin if not discharged 10/14. Pt has known hx of DM2 and takes Glipizide 10mg with Sitagliptin 100mg at home, does not take insulin.   - HgA1C 9.0% (average glucose 212) on admission  - mISS initially ordered but pt continually refuses  - Initiate basal bolus Lantus 8U at bedtime  - plan to start mealtime insulin if not discharged 10/14.

## 2020-10-14 NOTE — PROGRESS NOTE ADULT - PROBLEM SELECTOR PLAN 8
Problem: Thyroid nodule incidentally noted on imaging study.  Plan: Pt found to have incidental thyroid nodule on CT chest.  - Refer for outpatient follow-up. Pt found to have incidental thyroid nodule on CT chest.  - Refer for outpatient follow-up.

## 2020-10-14 NOTE — PROGRESS NOTE ADULT - ATTENDING COMMENTS
patient with elevated procal, esr, crp concerning for infection of unclear source vs inflammation   ct imaging not showing any source of infection or malignancy  will obtain wbc scan for better assessment   lactate improved with fluids, wbc improving  continued on broad spectrum abx for now

## 2020-10-14 NOTE — PROGRESS NOTE ADULT - PROBLEM SELECTOR PLAN 3
Problem: Stage 3 chronic kidney disease, unspecified whether stage 3a or 3b CKD.  Plan: Pt found to have an elevated Cr of 1.47. Baseline is unknown but prior records show Cr as high as 1.6. Denies changes in urination, hematuria, dysuria.  - Trend daily BMP. Stage 3 chronic kidney disease, unspecified whether stage 3a or 3b CKD. Pt found to have an elevated Cr of 1.47. Baseline is unknown but prior records show Cr as high as 1.6. Denies changes in urination, hematuria, dysuria.  - Trend daily BMP

## 2020-10-14 NOTE — PROGRESS NOTE ADULT - PROBLEM SELECTOR PLAN 6
Problem: HLD (hyperlipidemia). Plan: Pt has known hx HLD and takes pravastatin 20mg at home  - c/w Atorvastatin 10mg qhs via therapeutic interchange. Pt has known hx HLD and takes pravastatin 20mg at home  - c/w Atorvastatin 10mg qhs via therapeutic interchange.

## 2020-10-14 NOTE — PROGRESS NOTE ADULT - SUBJECTIVE AND OBJECTIVE BOX
OVERNIGHT EVENTS:    SUBJECTIVE / INTERVAL HPI: Patient seen and examined at bedside.     VITAL SIGNS:  Vital Signs Last 24 Hrs  T(C): 36.6 (14 Oct 2020 09:00), Max: 37 (13 Oct 2020 12:55)  T(F): 97.9 (14 Oct 2020 09:00), Max: 98.6 (13 Oct 2020 12:55)  HR: 86 (14 Oct 2020 09:00) (67 - 86)  BP: 157/67 (14 Oct 2020 09:00) (122/55 - 157/67)  BP(mean): --  RR: 17 (14 Oct 2020 09:00) (17 - 18)  SpO2: 97% (14 Oct 2020 09:00) (95% - 98%)    PHYSICAL EXAM:    General: Well developed, well nourished, no acute distress  HEENT: NC/AT; PERRL, anicteric sclera; MMM  Neck: supple, no JVD  Cardiovascular: +S1/S2, RRR; no murmurs, rubs, gallops  Respiratory: CTAB; no wheezes, rales, or rhonchi  Gastrointestinal: soft, NT/ND, no massess palpated; no rebound tenderness or guarding; +BS  Extremities: warm and well-perfused; no edema, clubbing or cyanosis  Vascular: 2+ radial, DP pulses B/L  Neurological: AAOx3; no focal deficits; SAFIA    MEDICATIONS:  MEDICATIONS  (STANDING):  aspirin enteric coated 81 milliGRAM(s) Oral daily  atorvastatin 10 milliGRAM(s) Oral at bedtime  clopidogrel Tablet 75 milliGRAM(s) Oral daily  dextrose 5%. 1000 milliLiter(s) (50 mL/Hr) IV Continuous <Continuous>  dextrose 50% Injectable 12.5 Gram(s) IV Push once  dextrose 50% Injectable 25 Gram(s) IV Push once  dextrose 50% Injectable 25 Gram(s) IV Push once  ferrous    sulfate 325 milliGRAM(s) Oral daily  heparin   Injectable 5000 Unit(s) SubCutaneous every 8 hours  influenza  Vaccine (HIGH DOSE) 0.7 milliLiter(s) IntraMuscular once  insulin glargine Injectable (LANTUS) 8 Unit(s) SubCutaneous at bedtime  insulin lispro (HumaLOG) corrective regimen sliding scale   SubCutaneous Before meals and at bedtime  piperacillin/tazobactam IVPB.. 3.375 Gram(s) IV Intermittent every 6 hours  tamsulosin 0.4 milliGRAM(s) Oral at bedtime    MEDICATIONS  (PRN):  dextrose 40% Gel 15 Gram(s) Oral once PRN Blood Glucose LESS THAN 70 milliGRAM(s)/deciliter  glucagon  Injectable 1 milliGRAM(s) IntraMuscular once PRN Glucose LESS THAN 70 milligrams/deciliter      ALLERGIES:  Allergies    No Known Allergies    Intolerances        LABS:                        9.0    12.71 )-----------( 186      ( 14 Oct 2020 06:07 )             29.2     10-14    139  |  109<H>  |  23  ----------------------------<  142<H>  4.6   |  23  |  1.58<H>    Ca    7.9<L>      14 Oct 2020 06:07  Phos  2.9     10-14  Mg     2.3     10-14    TPro  5.7<L>  /  Alb  2.9<L>  /  TBili  0.4  /  DBili  x   /  AST  53<H>  /  ALT  42  /  AlkPhos  118  10-14    PT/INR - ( 12 Oct 2020 21:43 )   PT: 12.3 sec;   INR: 1.03          PTT - ( 12 Oct 2020 21:43 )  PTT:26.6 sec  Urinalysis Basic - ( 12 Oct 2020 22:44 )    Color: Yellow / Appearance: Clear / S.015 / pH: x  Gluc: x / Ketone: NEGATIVE  / Bili: Negative / Urobili: 0.2 E.U./dL   Blood: x / Protein: NEGATIVE mg/dL / Nitrite: NEGATIVE   Leuk Esterase: NEGATIVE / RBC: x / WBC x   Sq Epi: x / Non Sq Epi: x / Bacteria: x      CAPILLARY BLOOD GLUCOSE      POCT Blood Glucose.: 141 mg/dL (14 Oct 2020 08:29)      RADIOLOGY & ADDITIONAL TESTS: Reviewed.    PLAN: OVERNIGHT EVENTS: No acute overnight events     SUBJECTIVE / INTERVAL HPI: Mr. Gonzales is a 89 y/o male (Qatari speaking) with a h/o HTN, DM, CKD, BPH, HLD, PAD, s/p r angio with sfa stent presented to the ED with rigors and chills 2 days ago on 10/12. Today pt says he is feeling well and denies, fevers, chills, fatigue, cough, SOB, chest pain, palpitations, nausea, vomiting abdominal pain, melena, dysuria, and rash.    VITAL SIGNS:  Vital Signs Last 24 Hrs  T(C): 36.6 (14 Oct 2020 09:00), Max: 37 (13 Oct 2020 12:55)  T(F): 97.9 (14 Oct 2020 09:00), Max: 98.6 (13 Oct 2020 12:55)  HR: 86 (14 Oct 2020 09:00) (67 - 86)  BP: 157/67 (14 Oct 2020 09:00) (122/55 - 157/67)  BP(mean): --  RR: 17 (14 Oct 2020 09:00) (17 - 18)  SpO2: 97% (14 Oct 2020 09:00) (95% - 98%)    PHYSICAL EXAM:    General: Well developed, well nourished, no acute distress  HEENT: NC/AT; PERRL, anicteric sclera; MMM  Neck: supple, no JVD  Cardiovascular: +S1/S2, RRR; no murmurs, rubs, gallops  Respiratory: CTAB; no wheezes, rales, or rhonchi  Gastrointestinal: soft, NT/ND, no massess palpated; no rebound tenderness or guarding; +BS  Extremities: warm and well-perfused; no edema, clubbing or cyanosis  Vascular: 2+ radial, DP pulses B/L  Neurological: AAOx3; no focal deficits; SAFIA    MEDICATIONS:  MEDICATIONS  (STANDING):  aspirin enteric coated 81 milliGRAM(s) Oral daily  atorvastatin 10 milliGRAM(s) Oral at bedtime  clopidogrel Tablet 75 milliGRAM(s) Oral daily  dextrose 5%. 1000 milliLiter(s) (50 mL/Hr) IV Continuous <Continuous>  dextrose 50% Injectable 12.5 Gram(s) IV Push once  dextrose 50% Injectable 25 Gram(s) IV Push once  dextrose 50% Injectable 25 Gram(s) IV Push once  ferrous    sulfate 325 milliGRAM(s) Oral daily  heparin   Injectable 5000 Unit(s) SubCutaneous every 8 hours  influenza  Vaccine (HIGH DOSE) 0.7 milliLiter(s) IntraMuscular once  insulin glargine Injectable (LANTUS) 8 Unit(s) SubCutaneous at bedtime  insulin lispro (HumaLOG) corrective regimen sliding scale   SubCutaneous Before meals and at bedtime  piperacillin/tazobactam IVPB.. 3.375 Gram(s) IV Intermittent every 6 hours  tamsulosin 0.4 milliGRAM(s) Oral at bedtime    MEDICATIONS  (PRN):  dextrose 40% Gel 15 Gram(s) Oral once PRN Blood Glucose LESS THAN 70 milliGRAM(s)/deciliter  glucagon  Injectable 1 milliGRAM(s) IntraMuscular once PRN Glucose LESS THAN 70 milligrams/deciliter      ALLERGIES:  Allergies    No Known Allergies    Intolerances        LABS:                        9.0    12.71 )-----------( 186      ( 14 Oct 2020 06:07 )             29.2     10-14    139  |  109<H>  |  23  ----------------------------<  142<H>  4.6   |  23  |  1.58<H>    Ca    7.9<L>      14 Oct 2020 06:07  Phos  2.9     10-14  Mg     2.3     10-14    TPro  5.7<L>  /  Alb  2.9<L>  /  TBili  0.4  /  DBili  x   /  AST  53<H>  /  ALT  42  /  AlkPhos  118  10-14    PT/INR - ( 12 Oct 2020 21:43 )   PT: 12.3 sec;   INR: 1.03          PTT - ( 12 Oct 2020 21:43 )  PTT:26.6 sec  Urinalysis Basic - ( 12 Oct 2020 22:44 )    Color: Yellow / Appearance: Clear / S.015 / pH: x  Gluc: x / Ketone: NEGATIVE  / Bili: Negative / Urobili: 0.2 E.U./dL   Blood: x / Protein: NEGATIVE mg/dL / Nitrite: NEGATIVE   Leuk Esterase: NEGATIVE / RBC: x / WBC x   Sq Epi: x / Non Sq Epi: x / Bacteria: x      CAPILLARY BLOOD GLUCOSE      POCT Blood Glucose.: 141 mg/dL (14 Oct 2020 08:29)      RADIOLOGY & ADDITIONAL TESTS: Reviewed.    PLAN: INTERVAL HPI: Mr. Gonzales is a 89 y/o male (Azeri speaking) with a h/o HTN, DM, CKD, BPH, HLD, PAD, s/p r angio with sfa stent presented to the ED with rigors and chills 2 days ago on 10/12.     SUBJECTIVE/OVERNIGHT EVENTS: No acute overnight events. Today pt says he is feeling well and denies, fevers, chills, fatigue, cough, SOB, chest pain, palpitations, nausea, vomiting abdominal pain, melena, dysuria, and rash.    VITAL SIGNS:  Vital Signs Last 24 Hrs  T(C): 36.6 (14 Oct 2020 09:00), Max: 37 (13 Oct 2020 12:55)  T(F): 97.9 (14 Oct 2020 09:00), Max: 98.6 (13 Oct 2020 12:55)  HR: 86 (14 Oct 2020 09:00) (67 - 86)  BP: 157/67 (14 Oct 2020 09:00) (122/55 - 157/67)  BP(mean): --  RR: 17 (14 Oct 2020 09:00) (17 - 18)  SpO2: 97% (14 Oct 2020 09:00) (95% - 98%)    PHYSICAL EXAM:    General: Well developed, well nourished, no acute distress  HEENT: NC/AT; PERRL, anicteric sclera; MMM  Neck: supple, no JVD  Cardiovascular: +S1/S2, RRR; no murmurs, rubs, gallops  Respiratory: CTAB; no wheezes, rales, or rhonchi  Gastrointestinal: soft, NT/ND, no massess palpated; no rebound tenderness or guarding; +BS  Extremities: warm and well-perfused; no edema, clubbing or cyanosis  Vascular: 2+ radial, DP pulses B/L  Neurological: AAOx3; no focal deficits; SAFIA    MEDICATIONS:  MEDICATIONS  (STANDING):  aspirin enteric coated 81 milliGRAM(s) Oral daily  atorvastatin 10 milliGRAM(s) Oral at bedtime  clopidogrel Tablet 75 milliGRAM(s) Oral daily  dextrose 5%. 1000 milliLiter(s) (50 mL/Hr) IV Continuous <Continuous>  dextrose 50% Injectable 12.5 Gram(s) IV Push once  dextrose 50% Injectable 25 Gram(s) IV Push once  dextrose 50% Injectable 25 Gram(s) IV Push once  ferrous    sulfate 325 milliGRAM(s) Oral daily  heparin   Injectable 5000 Unit(s) SubCutaneous every 8 hours  influenza  Vaccine (HIGH DOSE) 0.7 milliLiter(s) IntraMuscular once  insulin glargine Injectable (LANTUS) 8 Unit(s) SubCutaneous at bedtime  insulin lispro (HumaLOG) corrective regimen sliding scale   SubCutaneous Before meals and at bedtime  piperacillin/tazobactam IVPB.. 3.375 Gram(s) IV Intermittent every 6 hours  tamsulosin 0.4 milliGRAM(s) Oral at bedtime    MEDICATIONS  (PRN):  dextrose 40% Gel 15 Gram(s) Oral once PRN Blood Glucose LESS THAN 70 milliGRAM(s)/deciliter  glucagon  Injectable 1 milliGRAM(s) IntraMuscular once PRN Glucose LESS THAN 70 milligrams/deciliter      ALLERGIES:  Allergies    No Known Allergies    Intolerances        LABS:                        9.0    12.71 )-----------( 186      ( 14 Oct 2020 06:07 )             29.2     10-14    139  |  109<H>  |  23  ----------------------------<  142<H>  4.6   |  23  |  1.58<H>    Ca    7.9<L>      14 Oct 2020 06:07  Phos  2.9     10-14  Mg     2.3     10-14    TPro  5.7<L>  /  Alb  2.9<L>  /  TBili  0.4  /  DBili  x   /  AST  53<H>  /  ALT  42  /  AlkPhos  118  10-14    PT/INR - ( 12 Oct 2020 21:43 )   PT: 12.3 sec;   INR: 1.03          PTT - ( 12 Oct 2020 21:43 )  PTT:26.6 sec  Urinalysis Basic - ( 12 Oct 2020 22:44 )    Color: Yellow / Appearance: Clear / S.015 / pH: x  Gluc: x / Ketone: NEGATIVE  / Bili: Negative / Urobili: 0.2 E.U./dL   Blood: x / Protein: NEGATIVE mg/dL / Nitrite: NEGATIVE   Leuk Esterase: NEGATIVE / RBC: x / WBC x   Sq Epi: x / Non Sq Epi: x / Bacteria: x      CAPILLARY BLOOD GLUCOSE      POCT Blood Glucose.: 141 mg/dL (14 Oct 2020 08:29)      RADIOLOGY & ADDITIONAL TESTS: Reviewed.    PLAN: SUBJECTIVE/OVERNIGHT EVENTS: No acute overnight events. Today pt says he is feeling well and denies, fevers, chills, fatigue, cough, SOB, chest pain, palpitations, nausea, vomiting abdominal pain, melena, dysuria, and rash.    VITAL SIGNS:  Vital Signs Last 24 Hrs  T(C): 36.6 (14 Oct 2020 09:00), Max: 37 (13 Oct 2020 12:55)  T(F): 97.9 (14 Oct 2020 09:00), Max: 98.6 (13 Oct 2020 12:55)  HR: 86 (14 Oct 2020 09:00) (67 - 86)  BP: 157/67 (14 Oct 2020 09:00) (122/55 - 157/67)  BP(mean): --  RR: 17 (14 Oct 2020 09:00) (17 - 18)  SpO2: 97% (14 Oct 2020 09:00) (95% - 98%)    PHYSICAL EXAM:    General: Well developed, well nourished, no acute distress  Neck: supple, no JVD  Cardiovascular: +S1/S2, RRR; no murmurs, rubs, gallops  Respiratory: CTAB; no wheezes, rales, or rhonchi  Gastrointestinal: soft, NT/ND, no massess palpated; no rebound tenderness or guarding; +BS  Extremities: warm and well-perfused; no edema, clubbing or cyanosis  Vascular: 2+ radial, DP pulses B/L  Neurological: AAOx3    MEDICATIONS:  MEDICATIONS  (STANDING):  aspirin enteric coated 81 milliGRAM(s) Oral daily  atorvastatin 10 milliGRAM(s) Oral at bedtime  clopidogrel Tablet 75 milliGRAM(s) Oral daily  dextrose 5%. 1000 milliLiter(s) (50 mL/Hr) IV Continuous <Continuous>  dextrose 50% Injectable 12.5 Gram(s) IV Push once  dextrose 50% Injectable 25 Gram(s) IV Push once  dextrose 50% Injectable 25 Gram(s) IV Push once  ferrous    sulfate 325 milliGRAM(s) Oral daily  heparin   Injectable 5000 Unit(s) SubCutaneous every 8 hours  influenza  Vaccine (HIGH DOSE) 0.7 milliLiter(s) IntraMuscular once  insulin glargine Injectable (LANTUS) 8 Unit(s) SubCutaneous at bedtime  insulin lispro (HumaLOG) corrective regimen sliding scale   SubCutaneous Before meals and at bedtime  piperacillin/tazobactam IVPB.. 3.375 Gram(s) IV Intermittent every 6 hours  tamsulosin 0.4 milliGRAM(s) Oral at bedtime    MEDICATIONS  (PRN):  dextrose 40% Gel 15 Gram(s) Oral once PRN Blood Glucose LESS THAN 70 milliGRAM(s)/deciliter  glucagon  Injectable 1 milliGRAM(s) IntraMuscular once PRN Glucose LESS THAN 70 milligrams/deciliter      ALLERGIES:  Allergies    No Known Allergies    Intolerances        LABS:                        9.0    12.71 )-----------( 186      ( 14 Oct 2020 06:07 )             29.2     10-14    139  |  109<H>  |  23  ----------------------------<  142<H>  4.6   |  23  |  1.58<H>    Ca    7.9<L>      14 Oct 2020 06:07  Phos  2.9     10-14  Mg     2.3     10-14    TPro  5.7<L>  /  Alb  2.9<L>  /  TBili  0.4  /  DBili  x   /  AST  53<H>  /  ALT  42  /  AlkPhos  118  10-14    PT/INR - ( 12 Oct 2020 21:43 )   PT: 12.3 sec;   INR: 1.03          PTT - ( 12 Oct 2020 21:43 )  PTT:26.6 sec  Urinalysis Basic - ( 12 Oct 2020 22:44 )    Color: Yellow / Appearance: Clear / S.015 / pH: x  Gluc: x / Ketone: NEGATIVE  / Bili: Negative / Urobili: 0.2 E.U./dL   Blood: x / Protein: NEGATIVE mg/dL / Nitrite: NEGATIVE   Leuk Esterase: NEGATIVE / RBC: x / WBC x   Sq Epi: x / Non Sq Epi: x / Bacteria: x      CAPILLARY BLOOD GLUCOSE      POCT Blood Glucose.: 141 mg/dL (14 Oct 2020 08:29)      RADIOLOGY & ADDITIONAL TESTS: Reviewed.    PLAN:

## 2020-10-14 NOTE — PROGRESS NOTE ADULT - PROBLEM SELECTOR PLAN 7
Problem: BPH (benign prostatic hyperplasia).  Plan: Pt has known hx of BPH and takes Flomax 0.4mg at home.  - c/w Tamsulosin 0.4mg. Pt has known hx of BPH and takes Flomax 0.4mg at home.  - c/w Tamsulosin 0.4mg

## 2020-10-14 NOTE — PROGRESS NOTE ADULT - PROBLEM SELECTOR PLAN 10
Problem: Nutrition, metabolism, and development symptoms. Plan; F: none  E: replete PRN  N: DASH/TLC F: none  E: replete PRN  N: DASH/TLC  DVT ppx: heparin subQ  GI ppx:  Code status:

## 2020-10-14 NOTE — PROGRESS NOTE ADULT - ASSESSMENT
89M with PMH of HTN, DM2, HLD, PAD s/p R angio/sfa stent (8/2015) w/ confirmed patent sfa stent on angiogram (6/2020) on asa/plavix, chronic LE edema, BPH, presented with rigors and generalized weakness 2 days ago, with no current symptoms has presumed sepsis with no known cause, further work up is needed. 89yo M with PMH of HTN, DM2, HLD, PAD s/p R angio/sfa stent (8/2015) on asa/plavix, chronic LE edema, BPH, presented with rigors and generalized weakness 2 days ago, otherwise asymptomatic however SIRS-positive (WBC, febrile 102F, tachycardic), empirically being treated for sepsis of undetermined source. 89yo M with PMH of HTN, DM2, HLD, PAD s/p R angio/sfa stent (8/2015) on asa/plavix, chronic LE edema, BPH, presented with rigors and generalized weakness 2 days ago, otherwise asymptomatic however SIRS-positive (WBC, febrile 102F, tachycardic), empirically being treated for sepsis of undetermined source.

## 2020-10-14 NOTE — PROGRESS NOTE ADULT - PROBLEM SELECTOR PLAN 9
Problem: Renal cyst.  Plan: Pt found to have incidental renal cyst on CT chest.  - Refer for outpatient follow-up. Pt found to have incidental renal cyst on CT chest.  - Refer for outpatient follow-up.

## 2020-10-14 NOTE — PROGRESS NOTE ADULT - PROBLEM SELECTOR PLAN 2
Problem: Hypertension.  Plan: Pt has known hx of HTN and takes Amlodipine 10mg + Lisinopril 10mg at home.  - c/w holding antihypertensives given that pt has severe sepsis. Pt has known hx of HTN and takes Amlodipine 10mg + Lisinopril 10mg at home.  - c/w holding antihypertensives due to concern for severe sepsis.

## 2020-10-14 NOTE — PROGRESS NOTE ADULT - PROBLEM SELECTOR PLAN 1
Problem: Severe sepsis.  Plan: Pt found to be in severe sepsis on admission with T102.8F, HR >90, and with lactate 4.7 (source unknown). UA negative, CT Chest negative for PE or consolidations in the ED. Denies cough, runny nose, sore throat, abdominal pain, n/v/d/c, dysuria.   Obtain Peripheral smear   Perform Gallium Scan   - CT/PE negative for PE, consolidation  - CT A/P with contrast negative for infectious pathology   - c/w Zosyn 3.375g q6 hours Problem: Severe sepsis.  Plan: Pt found to be in severe sepsis on admission with T102.8F, HR >90, and with lactate 4.7 (source unknown). UA negative, CT Chest negative for PE or consolidations in the ED. Denies cough, runny nose, sore throat, abdominal pain, n/v/d/c, dysuria.   Obtain Peripheral smear   Perform Gallium Scan   - CT/PE negative for PE, consolidation  - CT A/P with contrast negative for infectious pathology   - c/w Zosyn 3.375g q6 hours    7 DAY ZOSYN COURSE:  Start Date: 10/13   Current Date: 10/14 Day 2 of treatment   End Date: 10/20 Pt found to be in severe sepsis on admission with T102.8F, HR >90, and with lactate 4.7 (source unknown). UA negative, CT Chest negative for PE or consolidations in the ED. Denies cough, runny nose, sore throat, abdominal pain, n/v/d/c, dysuria. CT/PE negative for PE, consolidation. CT A/P with contrast negative for infectious pathology.  - Obtain Peripheral smear   - Perform Gallium Scan   - c/w Zosyn 3.375g q6 hours x 7days (10/13-10/20) Pt found to be in severe sepsis on admission with T102.8F, HR >90, and with lactate 4.7 (source unknown). UA negative, CT Chest negative for PE or consolidations in the ED. Denies cough, runny nose, sore throat, abdominal pain, n/v/d/c, dysuria. CT/PE negative for PE, consolidation. CT A/P with contrast negative for infectious pathology. WBC 12.7, Procalcitonin 71, ESR 45, CRP 5.92, blood cultures returned positive for gram negative rods.  - Obtain Peripheral smear   - Perform Gallium Scan   -Trend WBCs  - c/w Zosyn 3.375g q6 hours x 7days (10/13-10/20)

## 2020-10-15 DIAGNOSIS — I82.409 ACUTE EMBOLISM AND THROMBOSIS OF UNSPECIFIED DEEP VEINS OF UNSPECIFIED LOWER EXTREMITY: ICD-10-CM

## 2020-10-15 DIAGNOSIS — R78.81 BACTEREMIA: ICD-10-CM

## 2020-10-15 LAB
ALBUMIN SERPL ELPH-MCNC: 2.8 G/DL — LOW (ref 3.3–5)
ALP SERPL-CCNC: 96 U/L — SIGNIFICANT CHANGE UP (ref 40–120)
ALT FLD-CCNC: 30 U/L — SIGNIFICANT CHANGE UP (ref 10–45)
ANION GAP SERPL CALC-SCNC: 9 MMOL/L — SIGNIFICANT CHANGE UP (ref 5–17)
AST SERPL-CCNC: 29 U/L — SIGNIFICANT CHANGE UP (ref 10–40)
BILIRUB SERPL-MCNC: 0.4 MG/DL — SIGNIFICANT CHANGE UP (ref 0.2–1.2)
BUN SERPL-MCNC: 17 MG/DL — SIGNIFICANT CHANGE UP (ref 7–23)
CALCIUM SERPL-MCNC: 8.5 MG/DL — SIGNIFICANT CHANGE UP (ref 8.4–10.5)
CHLORIDE SERPL-SCNC: 110 MMOL/L — HIGH (ref 96–108)
CO2 SERPL-SCNC: 22 MMOL/L — SIGNIFICANT CHANGE UP (ref 22–31)
CREAT SERPL-MCNC: 1.46 MG/DL — HIGH (ref 0.5–1.3)
CRP SERPL-MCNC: 3.56 MG/DL — HIGH (ref 0–0.4)
ERYTHROCYTE [SEDIMENTATION RATE] IN BLOOD: 47 MM/HR — HIGH
GLUCOSE BLDC GLUCOMTR-MCNC: 141 MG/DL — HIGH (ref 70–99)
GLUCOSE BLDC GLUCOMTR-MCNC: 151 MG/DL — HIGH (ref 70–99)
GLUCOSE BLDC GLUCOMTR-MCNC: 152 MG/DL — HIGH (ref 70–99)
GLUCOSE BLDC GLUCOMTR-MCNC: 325 MG/DL — HIGH (ref 70–99)
GLUCOSE SERPL-MCNC: 126 MG/DL — HIGH (ref 70–99)
HCT VFR BLD CALC: 30.2 % — LOW (ref 39–50)
HGB BLD-MCNC: 9.3 G/DL — LOW (ref 13–17)
MAGNESIUM SERPL-MCNC: 2.4 MG/DL — SIGNIFICANT CHANGE UP (ref 1.6–2.6)
MCHC RBC-ENTMCNC: 28.7 PG — SIGNIFICANT CHANGE UP (ref 27–34)
MCHC RBC-ENTMCNC: 30.8 GM/DL — LOW (ref 32–36)
MCV RBC AUTO: 93.2 FL — SIGNIFICANT CHANGE UP (ref 80–100)
NRBC # BLD: 0 /100 WBCS — SIGNIFICANT CHANGE UP (ref 0–0)
PLATELET # BLD AUTO: 190 K/UL — SIGNIFICANT CHANGE UP (ref 150–400)
POTASSIUM SERPL-MCNC: 4.2 MMOL/L — SIGNIFICANT CHANGE UP (ref 3.5–5.3)
POTASSIUM SERPL-SCNC: 4.2 MMOL/L — SIGNIFICANT CHANGE UP (ref 3.5–5.3)
PROCALCITONIN SERPL-MCNC: 43.41 NG/ML — HIGH (ref 0.02–0.1)
PROT SERPL-MCNC: 5.6 G/DL — LOW (ref 6–8.3)
RBC # BLD: 3.24 M/UL — LOW (ref 4.2–5.8)
RBC # FLD: 14.2 % — SIGNIFICANT CHANGE UP (ref 10.3–14.5)
SODIUM SERPL-SCNC: 141 MMOL/L — SIGNIFICANT CHANGE UP (ref 135–145)
WBC # BLD: 7.83 K/UL — SIGNIFICANT CHANGE UP (ref 3.8–10.5)
WBC # FLD AUTO: 7.83 K/UL — SIGNIFICANT CHANGE UP (ref 3.8–10.5)

## 2020-10-15 PROCEDURE — 99233 SBSQ HOSP IP/OBS HIGH 50: CPT | Mod: GC

## 2020-10-15 PROCEDURE — 93306 TTE W/DOPPLER COMPLETE: CPT | Mod: 26

## 2020-10-15 RX ORDER — CEFTRIAXONE 500 MG/1
1000 INJECTION, POWDER, FOR SOLUTION INTRAMUSCULAR; INTRAVENOUS ONCE
Refills: 0 | Status: COMPLETED | OUTPATIENT
Start: 2020-10-15 | End: 2020-10-15

## 2020-10-15 RX ORDER — APIXABAN 2.5 MG/1
10 TABLET, FILM COATED ORAL EVERY 12 HOURS
Refills: 0 | Status: DISCONTINUED | OUTPATIENT
Start: 2020-10-15 | End: 2020-10-16

## 2020-10-15 RX ORDER — CEFTRIAXONE 500 MG/1
2000 INJECTION, POWDER, FOR SOLUTION INTRAMUSCULAR; INTRAVENOUS EVERY 24 HOURS
Refills: 0 | Status: DISCONTINUED | OUTPATIENT
Start: 2020-10-16 | End: 2020-10-16

## 2020-10-15 RX ORDER — CEFTRIAXONE 500 MG/1
1000 INJECTION, POWDER, FOR SOLUTION INTRAMUSCULAR; INTRAVENOUS EVERY 24 HOURS
Refills: 0 | Status: DISCONTINUED | OUTPATIENT
Start: 2020-10-15 | End: 2020-10-15

## 2020-10-15 RX ORDER — AMLODIPINE BESYLATE 2.5 MG/1
5 TABLET ORAL DAILY
Refills: 0 | Status: DISCONTINUED | OUTPATIENT
Start: 2020-10-15 | End: 2020-10-16

## 2020-10-15 RX ADMIN — Medication 2: at 22:15

## 2020-10-15 RX ADMIN — TAMSULOSIN HYDROCHLORIDE 0.4 MILLIGRAM(S): 0.4 CAPSULE ORAL at 22:14

## 2020-10-15 RX ADMIN — CEFTRIAXONE 100 MILLIGRAM(S): 500 INJECTION, POWDER, FOR SOLUTION INTRAMUSCULAR; INTRAVENOUS at 16:34

## 2020-10-15 RX ADMIN — Medication 81 MILLIGRAM(S): at 13:28

## 2020-10-15 RX ADMIN — Medication 8: at 14:37

## 2020-10-15 RX ADMIN — Medication 325 MILLIGRAM(S): at 13:29

## 2020-10-15 RX ADMIN — INSULIN GLARGINE 8 UNIT(S): 100 INJECTION, SOLUTION SUBCUTANEOUS at 22:15

## 2020-10-15 RX ADMIN — AMLODIPINE BESYLATE 5 MILLIGRAM(S): 2.5 TABLET ORAL at 09:44

## 2020-10-15 RX ADMIN — Medication 2: at 17:18

## 2020-10-15 RX ADMIN — PIPERACILLIN AND TAZOBACTAM 200 GRAM(S): 4; .5 INJECTION, POWDER, LYOPHILIZED, FOR SOLUTION INTRAVENOUS at 06:20

## 2020-10-15 RX ADMIN — CLOPIDOGREL BISULFATE 75 MILLIGRAM(S): 75 TABLET, FILM COATED ORAL at 13:29

## 2020-10-15 RX ADMIN — CEFTRIAXONE 100 MILLIGRAM(S): 500 INJECTION, POWDER, FOR SOLUTION INTRAMUSCULAR; INTRAVENOUS at 13:29

## 2020-10-15 RX ADMIN — APIXABAN 10 MILLIGRAM(S): 2.5 TABLET, FILM COATED ORAL at 18:06

## 2020-10-15 RX ADMIN — ATORVASTATIN CALCIUM 10 MILLIGRAM(S): 80 TABLET, FILM COATED ORAL at 22:14

## 2020-10-15 NOTE — PROGRESS NOTE ADULT - PROBLEM SELECTOR PLAN 3
Problem: Hypertension.  Plan: Pt has known hx of HTN and takes Amlodipine 10mg + Lisinopril 10mg at home. Patient's last blood pressure read 151/69  -Start Amlodipine 5mg Pt has h/o left sfa stent and found to have D-Dimer of 1943 on admission with b/l LE pitting edema on exam, non tender non erythematous shins b/l bilateral DVTs in r popliteal artery and left great saphenous femoral junction found on doppler   -d/c Lovenox 80mg treatment  -Start Eliquis  -Obtain Vascular consult Pt has h/o left sfa stent and found to have D-Dimer of 1943 on admission with b/l LE pitting edema on exam, non tender non erythematous shins b/l bilateral DVTs in r popliteal artery and left great saphenous femoral junction found on doppler   -d/c Lovenox 80mg treatment  -Start Eliquis 10mg q12h loading dose  -per vascular, discontinuing plavix to avoid triple therapy

## 2020-10-15 NOTE — PROGRESS NOTE ADULT - SUBJECTIVE AND OBJECTIVE BOX
OVERNIGHT EVENTS: No acute overnight events    SUBJECTIVE / INTERVAL HPI: Today pt says he is feeling well and denies, fevers, chills, fatigue, cough, SOB, chest pain, palpitations, nausea, vomiting abdominal pain, melena, dysuria, and rash.      VITAL SIGNS:  Vital Signs Last 24 Hrs  T(C): 37.1 (15 Oct 2020 08:44), Max: 37.1 (15 Oct 2020 08:44)  T(F): 98.7 (15 Oct 2020 08:44), Max: 98.7 (15 Oct 2020 08:44)  HR: 69 (15 Oct 2020 08:44) (69 - 96)  BP: 149/70 (15 Oct 2020 08:44) (140/63 - 158/69)  BP(mean): --  RR: 17 (15 Oct 2020 08:44) (17 - 18)  SpO2: 98% (15 Oct 2020 08:44) (94% - 98%)    PHYSICAL EXAM:    General: Well developed, well nourished, no acute distress  HEENT: NC/AT; PERRL, anicteric sclera; MMM  Neck: supple  Cardiovascular: +S1/S2, RRR, no murmurs, rubs, gallops  Respiratory: CTA B/L; no W/R/R  Gastrointestinal: soft, NT/ND; +BSx4  Extremities: WWP; no edema, clubbing or cyanosis  Vascular: 2+ radial, DP/PT pulses B/L  Neurological: AAOx3; no focal deficits    MEDICATIONS:  MEDICATIONS  (STANDING):  amLODIPine   Tablet 5 milliGRAM(s) Oral daily  apixaban 10 milliGRAM(s) Oral every 12 hours  aspirin enteric coated 81 milliGRAM(s) Oral daily  atorvastatin 10 milliGRAM(s) Oral at bedtime  cefTRIAXone   IVPB 1000 milliGRAM(s) IV Intermittent every 24 hours  clopidogrel Tablet 75 milliGRAM(s) Oral daily  dextrose 5%. 1000 milliLiter(s) (50 mL/Hr) IV Continuous <Continuous>  dextrose 50% Injectable 12.5 Gram(s) IV Push once  dextrose 50% Injectable 25 Gram(s) IV Push once  dextrose 50% Injectable 25 Gram(s) IV Push once  ferrous    sulfate 325 milliGRAM(s) Oral daily  influenza  Vaccine (HIGH DOSE) 0.7 milliLiter(s) IntraMuscular once  insulin glargine Injectable (LANTUS) 8 Unit(s) SubCutaneous at bedtime  insulin lispro (HumaLOG) corrective regimen sliding scale   SubCutaneous Before meals and at bedtime  tamsulosin 0.4 milliGRAM(s) Oral at bedtime    MEDICATIONS  (PRN):  dextrose 40% Gel 15 Gram(s) Oral once PRN Blood Glucose LESS THAN 70 milliGRAM(s)/deciliter  glucagon  Injectable 1 milliGRAM(s) IntraMuscular once PRN Glucose LESS THAN 70 milligrams/deciliter      ALLERGIES:  Allergies    No Known Allergies    Intolerances        LABS:                        9.3    7.83  )-----------( 190      ( 15 Oct 2020 07:18 )             30.2     10-15    141  |  110<H>  |  17  ----------------------------<  126<H>  4.2   |  22  |  1.46<H>    Ca    8.5      15 Oct 2020 07:18  Phos  2.9     10-14  Mg     2.4     10-15    TPro  5.6<L>  /  Alb  2.8<L>  /  TBili  0.4  /  DBili  x   /  AST  29  /  ALT  30  /  AlkPhos  96  10-15        CAPILLARY BLOOD GLUCOSE      POCT Blood Glucose.: 141 mg/dL (15 Oct 2020 08:19)      RADIOLOGY & ADDITIONAL TESTS: Reviewed.     OVERNIGHT EVENTS: No acute overnight events    SUBJECTIVE / INTERVAL HPI: Today pt says he is feeling well and denies, fevers, chills, fatigue, cough, SOB, chest pain, palpitations, nausea, vomiting abdominal pain, melena, dysuria, and rash.      VITAL SIGNS:  Vital Signs Last 24 Hrs  T(C): 37.1 (15 Oct 2020 08:44), Max: 37.1 (15 Oct 2020 08:44)  T(F): 98.7 (15 Oct 2020 08:44), Max: 98.7 (15 Oct 2020 08:44)  HR: 69 (15 Oct 2020 08:44) (69 - 96)  BP: 149/70 (15 Oct 2020 08:44) (140/63 - 158/69)  BP(mean): --  RR: 17 (15 Oct 2020 08:44) (17 - 18)  SpO2: 98% (15 Oct 2020 08:44) (94% - 98%)    PHYSICAL EXAM:    General: Well developed, well nourished, no acute distress  Neck: supple  Cardiovascular: +S1/S2, RRR, no murmurs, rubs, gallops  Respiratory: CTA B/L; no W/R/R  Gastrointestinal: soft, NT/ND; +BSx4  Extremities: WWP; no edema, clubbing or cyanosis  Vascular: 2+ radial, DP/PT pulses B/L  Neurological: AAOx3; no focal deficits    MEDICATIONS:  MEDICATIONS  (STANDING):  amLODIPine   Tablet 5 milliGRAM(s) Oral daily  apixaban 10 milliGRAM(s) Oral every 12 hours  aspirin enteric coated 81 milliGRAM(s) Oral daily  atorvastatin 10 milliGRAM(s) Oral at bedtime  cefTRIAXone   IVPB 1000 milliGRAM(s) IV Intermittent every 24 hours  clopidogrel Tablet 75 milliGRAM(s) Oral daily  dextrose 5%. 1000 milliLiter(s) (50 mL/Hr) IV Continuous <Continuous>  dextrose 50% Injectable 12.5 Gram(s) IV Push once  dextrose 50% Injectable 25 Gram(s) IV Push once  dextrose 50% Injectable 25 Gram(s) IV Push once  ferrous    sulfate 325 milliGRAM(s) Oral daily  influenza  Vaccine (HIGH DOSE) 0.7 milliLiter(s) IntraMuscular once  insulin glargine Injectable (LANTUS) 8 Unit(s) SubCutaneous at bedtime  insulin lispro (HumaLOG) corrective regimen sliding scale   SubCutaneous Before meals and at bedtime  tamsulosin 0.4 milliGRAM(s) Oral at bedtime    MEDICATIONS  (PRN):  dextrose 40% Gel 15 Gram(s) Oral once PRN Blood Glucose LESS THAN 70 milliGRAM(s)/deciliter  glucagon  Injectable 1 milliGRAM(s) IntraMuscular once PRN Glucose LESS THAN 70 milligrams/deciliter      ALLERGIES:  Allergies    No Known Allergies    Intolerances        LABS:                        9.3    7.83  )-----------( 190      ( 15 Oct 2020 07:18 )             30.2     10-15    141  |  110<H>  |  17  ----------------------------<  126<H>  4.2   |  22  |  1.46<H>    Ca    8.5      15 Oct 2020 07:18  Phos  2.9     10-14  Mg     2.4     10-15    TPro  5.6<L>  /  Alb  2.8<L>  /  TBili  0.4  /  DBili  x   /  AST  29  /  ALT  30  /  AlkPhos  96  10-15        CAPILLARY BLOOD GLUCOSE      POCT Blood Glucose.: 141 mg/dL (15 Oct 2020 08:19)      RADIOLOGY & ADDITIONAL TESTS: Reviewed.

## 2020-10-15 NOTE — PROGRESS NOTE ADULT - PROBLEM SELECTOR PLAN 1
Pt found to be in severe sepsis on admission with T102.8F, HR >90, and with lactate 4.7 (source unknown). UA negative, CT Chest negative for PE or consolidations in the ED. Denies cough, runny nose, sore throat, abdominal pain, n/v/d/c, dysuria. CT/PE negative for PE, consolidation. CT A/P with contrast negative for infectious pathology. WBC 7.83 , Procalcitonin 43.41, ESR 45, CRP 3.56, blood culture speciation returned positive for E. Coli   -Perform Gallium Scan   -Trend WBCs   -c/w Zosyn q6 hours x 7 days (10/13-10/20) Pt found to be in severe sepsis on admission with T102.8F, HR >90, and with lactate 4.7 (source unknown). UA negative, CT Chest negative for PE or consolidations in the ED. Denies cough, runny nose, sore throat, abdominal pain, n/v/d/c, dysuria. CT/PE negative for PE, consolidation. CT A/P with contrast negative for infectious pathology. WBC 7.83 , Procalcitonin 43.41, ESR 45, CRP 3.56, blood culture speciation returned positive for E. Coli   -Perform Gallium Scan   -Trend WBCs   -d/c Zosyn started Ceftriaxone 1000mg IV q24hrs Pt found to be in severe sepsis on admission with T102.8F, HR >90, and with lactate 4.7 (source unknown). UA negative, CT Chest negative for PE or consolidations in the ED. Denies cough, runny nose, sore throat, abdominal pain, n/v/d/c, dysuria. CT/PE negative for PE, consolidation. CT A/P with contrast negative for infectious pathology.   - This AM WBC 7.83, Procalcitonin 43.41, ESR 45, CRP 3.56,   - blood culture speciation returned positive for E. Coli   - f/u Gallium Scan today  -Trend WBCs   -d/c'ed Zosyn and de-escalated to Ceftriaxone 1000mg IV q24hrs today Pt found to be in severe sepsis on admission with T102.8F, HR >90, and with lactate 4.7 (source unknown). UA negative, CT Chest negative for PE or consolidations in the ED. Denies cough, runny nose, sore throat, abdominal pain, n/v/d/c, dysuria. CT/PE negative for PE, consolidation. CT A/P with contrast negative for infectious pathology.   - This AM WBC 7.83, Procalcitonin 43.41, ESR 45, CRP 3.56,   - blood culture speciation returned positive for E. Coli   - f/u Gallium Scan today  -Trend WBCs   -d/c'ed Zosyn and de-escalated to Ceftriaxone 2000mg IV q24hrs today

## 2020-10-15 NOTE — CONSULT NOTE ADULT - SUBJECTIVE AND OBJECTIVE BOX
Attending:  Jose Martin    HPI:  89M Bengali-speaking with PMHx HTN, DM2, HLD, PAD s/p R angio/sfa stent (8/2015) w/ confirmed patent sfa stent on angiogram (6/2020) on asa/plavix, chronic LE edema, BPH, presenting with rigors and generalized weakness. Per patient and wife, symptoms started today. Pt also reports achy low back pain (which is chronic ~ 2years, with no associated trauma) and foot pain when ambulating. Denies HA, dizziness, lightheadedness, neck pain, neck stiffness, URI symptoms, cough, CP, chest tightness, abd pain, N/V/D, no hematochezia/melena, no dysuria, no rash, no back pain, no extremity weakness/numbness/paresthesia, no saddle anesthesia, no urinary/fecal incontinence/retention, no recent travel, no sick contacts, no prior covid, no trauma.     ED Vitals: Tmax 102.8F, HR 90s-120s, BP 100s-140s/50s-60s RR 20 SpO2 95-98% RA  ED labs: WBC wnl, H/H 10.7/33.6, Cr 1.47, D-dimer 1943, Lactate 4.7 --> 2.0 COVID- UA- Procalcitonin 7, Ferritin 16  EKG: sinus tachycardia, left axis deviation, RBBB, QTc 478  CT PE: no PE, hypodense nodules in thyroid  ED Course: Tylenol 650mg x2, 2.5L NS, Zosyn 3.375g     (13 Oct 2020 01:56)    PAST MEDICAL & SURGICAL HISTORY:  Claudication    Hypertension    DM (diabetes mellitus)    HLD (hyperlipidemia)    Cataracts, both eyes    H/O hernia repair    Presence of stent in artery  femoral artery- left    MEDICATIONS  (STANDING):  amLODIPine   Tablet 5 milliGRAM(s) Oral daily  apixaban 10 milliGRAM(s) Oral every 12 hours  aspirin enteric coated 81 milliGRAM(s) Oral daily  atorvastatin 10 milliGRAM(s) Oral at bedtime  cefTRIAXone   IVPB 1000 milliGRAM(s) IV Intermittent once  dextrose 5%. 1000 milliLiter(s) (50 mL/Hr) IV Continuous <Continuous>  dextrose 50% Injectable 12.5 Gram(s) IV Push once  dextrose 50% Injectable 25 Gram(s) IV Push once  dextrose 50% Injectable 25 Gram(s) IV Push once  ferrous    sulfate 325 milliGRAM(s) Oral daily  influenza  Vaccine (HIGH DOSE) 0.7 milliLiter(s) IntraMuscular once  insulin glargine Injectable (LANTUS) 8 Unit(s) SubCutaneous at bedtime  insulin lispro (HumaLOG) corrective regimen sliding scale   SubCutaneous Before meals and at bedtime  tamsulosin 0.4 milliGRAM(s) Oral at bedtime    MEDICATIONS  (PRN):  dextrose 40% Gel 15 Gram(s) Oral once PRN Blood Glucose LESS THAN 70 milliGRAM(s)/deciliter  glucagon  Injectable 1 milliGRAM(s) IntraMuscular once PRN Glucose LESS THAN 70 milligrams/deciliter    Allergies    No Known Allergies    Intolerances    FAMILY HISTORY:  FH: diabetes mellitus  mother    Vital Signs Last 24 Hrs  T(C): 36.9 (15 Oct 2020 15:30), Max: 37.1 (15 Oct 2020 08:44)  T(F): 98.5 (15 Oct 2020 15:30), Max: 98.7 (15 Oct 2020 08:44)  HR: 80 (15 Oct 2020 15:30) (69 - 96)  BP: 145/65 (15 Oct 2020 15:30) (140/63 - 151/69)  BP(mean): --  RR: 18 (15 Oct 2020 15:30) (17 - 18)  SpO2: 99% (15 Oct 2020 15:30) (96% - 99%)    I&O's Summary    14 Oct 2020 07:01  -  15 Oct 2020 07:00  --------------------------------------------------------  IN: 200 mL / OUT: 300 mL / NET: -100 mL    15 Oct 2020 07:01  -  15 Oct 2020 15:59  --------------------------------------------------------  IN: 50 mL / OUT: 0 mL / NET: 50 mL        Physical Exam:  General: NAD, resting comfortably  HEENT: NC/AT, EOMI, normal hearing, no carotid bruits  Pulmonary: normal resp effort, CTA-B  Cardiovascular: NSR  Abdominal: soft, NT/ND, no organomegaly  Extremities: WWP, normal strength, no clubbing/cyanosis/edema  Neuro: A/O x 3, CNs II-XII grossly intact, normal sensation, no focal deficits  Pulses:   Right:  FEM [ x]2+ [ ]1+ [ ]doppler    POP [ ]2+ [ x]1+ [ ]doppler  DP [ ]2+ [ ]1+ [ x]doppler monophasic  PT[ ]2+ [ ]1+ [ x]doppler monophasic    Left:  FEM [ x]2+ [ ]1+ [ ]doppler  POP [ ]2+ [ x]1+ [ ]doppler  DP [ ]2+ [ ]1+ [ ]doppler non-dopplerable  PT [ ]2+ [ ]1+ [ x]doppler monophasic    Lines/drains/tubes:    LABS:                        9.3    7.83  )-----------( 190      ( 15 Oct 2020 07:18 )             30.2     10-15    141  |  110<H>  |  17  ----------------------------<  126<H>  4.2   |  22  |  1.46<H>    Ca    8.5      15 Oct 2020 07:18  Phos  2.9     10-14  Mg     2.4     10-15    TPro  5.6<L>  /  Alb  2.8<L>  /  TBili  0.4  /  DBili  x   /  AST  29  /  ALT  30  /  AlkPhos  96  10-15        CAPILLARY BLOOD GLUCOSE      POCT Blood Glucose.: 325 mg/dL (15 Oct 2020 14:33)  POCT Blood Glucose.: 141 mg/dL (15 Oct 2020 08:19)  POCT Blood Glucose.: 250 mg/dL (14 Oct 2020 22:04)  POCT Blood Glucose.: 189 mg/dL (14 Oct 2020 17:36)    LIVER FUNCTIONS - ( 15 Oct 2020 07:18 )  Alb: 2.8 g/dL / Pro: 5.6 g/dL / ALK PHOS: 96 U/L / ALT: 30 U/L / AST: 29 U/L / GGT: x             Cultures:  Culture Results:   No growth at 12 hours (10-14 @ 19:13)      RADIOLOGY & ADDITIONAL STUDIES:

## 2020-10-15 NOTE — PHYSICAL THERAPY INITIAL EVALUATION ADULT - PERTINENT HX OF CURRENT PROBLEM, REHAB EVAL
89M Per patient and wife, symptoms started today. Pt also reports achy low back pain (which is chronic ~ 2years, with no associated trauma) and foot pain when ambulating.

## 2020-10-15 NOTE — PROGRESS NOTE ADULT - PROBLEM SELECTOR PLAN 5
Pt found to have an elevated Cr of 1.47. Baseline is unknown but prior records show Cr as high as 1.6. Denies changes in urination, hematuria, dysuria.  - Trend daily BMP.

## 2020-10-15 NOTE — PROGRESS NOTE ADULT - ASSESSMENT
89yo M with PMH of HTN, DM2, HLD, PAD s/p R angio/sfa stent (8/2015) on asa/plavix, chronic LE edema, BPH, presented with rigors and generalized weakness 3 days ago, otherwise asymptomatic however SIRS-positive (WBC, febrile 102F, tachycardic), empirically being treated for sepsis of undetermined source, bilateral DVTs in r popliteal artery and left great saphenous femoral junction found on doppler being treated with lovenox 80mg.

## 2020-10-15 NOTE — PROGRESS NOTE ADULT - PROBLEM SELECTOR PLAN 2
Pt has h/o left sfa stent and found to have D-Dimer of 1943 on admission with b/l LE pitting edema on exam, non tender non erythematous shins b/l bilateral DVTs in r popliteal artery and left great saphenous femoral junction found on doppler   -c/w Lovenox 80mg treatment  -Obtain Vascular consult Pt found to be in severe sepsis on admission with T102.8F, HR >90, and with lactate 4.7 (source unknown). UA negative, CT Chest negative for PE or consolidations in the ED. Denies cough, runny nose, sore throat, abdominal pain, n/v/d/c, dysuria. CT/PE negative for PE, consolidation. CT A/P with contrast negative for infectious pathology.  blood culture speciation returned positive for E. Coli   -d/c Zosyn started Ceftriaxone 1000mg IV q24hrs

## 2020-10-15 NOTE — CONSULT NOTE ADULT - SUBJECTIVE AND OBJECTIVE BOX
Patient is a 89y old  Male who presents with a chief complaint of severe sepsis 2/2 unknown source (15 Oct 2020 11:42)       HPI:  89M Kiswahili-speaking with PMHx HTN, DM2, HLD, PAD s/p R angio/sfa stent (8/2015) w/ confirmed patent sfa stent on angiogram (6/2020) on asa/plavix, chronic LE edema, BPH, presenting with rigors and generalized weakness. Per patient and wife, symptoms started today. Pt also reports achy low back pain (which is chronic ~ 2years, with no associated trauma) and foot pain when ambulating. Denies HA, dizziness, lightheadedness, neck pain, neck stiffness, URI symptoms, cough, CP, chest tightness, abd pain, N/V/D, no hematochezia/melena, no dysuria, no rash, no back pain, no extremity weakness/numbness/paresthesia, no saddle anesthesia, no urinary/fecal incontinence/retention, no recent travel, no sick contacts, no prior covid, no trauma.     ED Vitals: Tmax 102.8F, HR 90s-120s, BP 100s-140s/50s-60s RR 20 SpO2 95-98% RA  ED labs: WBC wnl, H/H 10.7/33.6, Cr 1.47, D-dimer 1943, Lactate 4.7 --> 2.0 COVID- UA- Procalcitonin 7, Ferritin 16  EKG: sinus tachycardia, left axis deviation, RBBB, QTc 478  CT PE: no PE, hypodense nodules in thyroid  ED Course: Tylenol 650mg x2, 2.5L NS, Zosyn 3.375g       (13 Oct 2020 01:56)      PAST MEDICAL & SURGICAL HISTORY:  Claudication    Hypertension    DM (diabetes mellitus)    HLD (hyperlipidemia)    Cataracts, both eyes    H/O hernia repair    Presence of stent in artery  femoral artery- left        MEDICATIONS  (STANDING):  amLODIPine   Tablet 5 milliGRAM(s) Oral daily  apixaban 10 milliGRAM(s) Oral every 12 hours  aspirin enteric coated 81 milliGRAM(s) Oral daily  atorvastatin 10 milliGRAM(s) Oral at bedtime  cefTRIAXone   IVPB 1000 milliGRAM(s) IV Intermittent every 24 hours  dextrose 5%. 1000 milliLiter(s) (50 mL/Hr) IV Continuous <Continuous>  dextrose 50% Injectable 12.5 Gram(s) IV Push once  dextrose 50% Injectable 25 Gram(s) IV Push once  dextrose 50% Injectable 25 Gram(s) IV Push once  ferrous    sulfate 325 milliGRAM(s) Oral daily  influenza  Vaccine (HIGH DOSE) 0.7 milliLiter(s) IntraMuscular once  insulin glargine Injectable (LANTUS) 8 Unit(s) SubCutaneous at bedtime  insulin lispro (HumaLOG) corrective regimen sliding scale   SubCutaneous Before meals and at bedtime  tamsulosin 0.4 milliGRAM(s) Oral at bedtime    MEDICATIONS  (PRN):  dextrose 40% Gel 15 Gram(s) Oral once PRN Blood Glucose LESS THAN 70 milliGRAM(s)/deciliter  glucagon  Injectable 1 milliGRAM(s) IntraMuscular once PRN Glucose LESS THAN 70 milligrams/deciliter      Social History:           -  Home Living Status: lives with his wife in an elevator accessible apartment building           -  Prior Home Care Services:  none    Baseline Functional Level Prior to Admission:           - ADL's:    patient states he is independent           - ambulatory status PTA:  walked with a cane    FAMILY HISTORY:  FH: diabetes mellitus  mother        CBC Full  -  ( 15 Oct 2020 07:18 )  WBC Count : 7.83 K/uL  RBC Count : 3.24 M/uL  Hemoglobin : 9.3 g/dL  Hematocrit : 30.2 %  Platelet Count - Automated : 190 K/uL  Mean Cell Volume : 93.2 fl  Mean Cell Hemoglobin : 28.7 pg  Mean Cell Hemoglobin Concentration : 30.8 gm/dL  Auto Neutrophil # : x  Auto Lymphocyte # : x  Auto Monocyte # : x  Auto Eosinophil # : x  Auto Basophil # : x  Auto Neutrophil % : x  Auto Lymphocyte % : x  Auto Monocyte % : x  Auto Eosinophil % : x  Auto Basophil % : x      10-15    141  |  110<H>  |  17  ----------------------------<  126<H>  4.2   |  22  |  1.46<H>    Ca    8.5      15 Oct 2020 07:18  Phos  2.9     10-14  Mg     2.4     10-15    TPro  5.6<L>  /  Alb  2.8<L>  /  TBili  0.4  /  DBili  x   /  AST  29  /  ALT  30  /  AlkPhos  96  10-15            Radiology:    < from: Xray Chest 1 View- PORTABLE-Urgent (10.12.20 @ 22:41) >    EXAM:  XR CHEST PORTABLE URGENT 1V                          PROCEDURE DATE:  10/12/2020          INTERPRETATION:  XR CHEST URGENT dated 10/12/2020 10:41 PM    CLINICAL INFORMATION: Male, 89 years old.  fever.    PRIOR STUDIES: 8/23/2020    FINDINGS: Limited examination due to the low lung volumes and portable technique. Heart size, mediastinal and hilar contours are unchanged. There may be crowding of the infrahilar pulmonary vessels versus bibasilar atelectasis more pronounced on the left side. A repeat two-view chest would be of value for more complete evaluation to include deeper level of inspiration.    IMPRESSION:  Limited study due to the low lung volumes.  Please see above discussion.        < from: CT Angio Chest PE Protocol w/ IV Cont (10.13.20 @ 00:03) >  EXAM:  CT ANGIO CHEST PE PROTOCOL IC                          PROCEDURE DATE:  10/13/2020          INTERPRETATION:  CTA (CT angiography) of the CHEST    INDICATION: Fever. Assess for pulmonary embolism.    TECHNIQUE: CT angiography of the chest wasperformed during bolus injection of 100 cc of Optiray 350 intravenous contrast.  Post-processing including the production of axial, coronal and sagittal multiplanar reformatted images and axial and coronal maximum intensity projections (MIPs) was performed.    PRIOR STUDY: CT of the abdomen and pelvis from 8/23/2020.    FINDINGS:    Pulmonary arteries: No pulmonary embolism is seen. Mild prominence of the central pulmonary arteries may be related to pulmonary hypertension.    Lungs and large airways: No pulmonary consolidation or mass. Small atelectasis versus parenchymal scarring at the bilateral lung bases.    Pleura:  No pleural effusion or pneumothorax.    Mediastinum and hilar regions: No thoracic lymphadenopathy. No pericardial effusion.    Cardiovascular:  Heart size is normal. Heavy coronary artery calcifications. Moderate calcified and noncalcified plaques throughout the aorta. No thoracic aortic aneurysm or dissection.    Chest wall and lower neck:  Subcentimeter hypodense nodules in the right thyroid lobe.    Upper abdomen: No acute abnormality. Cholelithiasis. Right renal cyst. Colonic diverticulosis.    Bones: Degenerative changes of the spine. Mild thoracic dextrocurvature.      IMPRESSION:  No pulmonary embolus.          < from: CT Abdomen and Pelvis w/ Oral Cont and w/ IV Cont (10.14.20 @ 02:57) >  EXAM:  CT ABDOMEN AND PELVIS OC IC                          PROCEDURE DATE:  10/14/2020          INTERPRETATION:  CT SCAN OF ABDOMEN AND PELVIS    History: SIRS with no source. Sepsis.    Technique: CT scan of abdomen and pelvis was performed from lung bases through symphysis pubis. Intravenous contrast material were utilized. Axial, sagittal and coronal reformatted images were reviewed.    Comparison: CT abdomen/pelvis 8/23/2020. CT chest 10/12/2010.    Findings:    Lower chest: Small bilateral pleural effusions. Mild atelectatic changes. Thickened interlobular septae in the lung bases.    Liver:  No focal lesion.    Gallbladder: Areas of mild gallbladder wall thickening, similar to the study of 8/23/2020. Possible small gallstones.    Spleen:  Normal.    Pancreas:  Normal.    Adrenal glands:  Normal.    Kidneys: 2.6 cm cyst upper pole right kidney. A couple of subcentimeter hypodense lesions in the left kidney, too small to characterize. No hydronephrosis. Areas of cortical thinning bilaterally.    Adenopathy:  No lymphadenopathy in abdomen or pelvis.    Ascites: None.    Gastrointestinal tract: Limited evaluation without oral contrast. Normal appendix. Colonic diverticulosis. Infiltration of the central mesenteric fat, similar tothe prior study. Mild mesenteric edema. No bowel obstruction or free air. No ascites.    Vessels: Severe atherosclerotic calcifications. No abdominal aortic aneurysm.    Pelvic organs: Unremarkable.    Soft tissues: Normal.    Bones: Degenerative changes of the bones.      IMPRESSION:    Development of small bilateral pleural effusions.    Persistent infiltration of the central mesenteric fat which could be a sequela of chronic mesenteric panniculitis.    Mild mesenteric edema could be due to congestive heart failure.    Areas of gallbladder wall thickening, similar to study of August 23, 2020. Possible small stones. No gallbladder distention or other evidence of acute cholecystitis. May consider correlation with ultrasound.        < from: US Duplex Venous Lower Ext Complete, Bilateral (10.14.20 @ 16:12) >  EXAM:  US DPLX LWR EXT VEINS COMPL BI                          PROCEDURE DATE:  10/14/2020          INTERPRETATION:  VENOUS DUPLEX DOPPLER OF BOTH LOWER EXTREMITIES dated 10/14/2020 4:12 PM    INDICATION: Lower extremity pain. R/o DVT.    TECHNIQUE: Duplex Doppler evaluation including gray-scale ultrasound imaging, color flow Doppler imaging, and Doppler spectral analysis of the veins of both lower extremities was performed.    COMPARISON: CT abdomen and pelvis from 10/14/2020.    FINDINGS:    Thigh veins: There is thrombus in the right popliteal vein as well as in the left greater saphenous vein at the left saphenofemoral junction. The remaining common femoral, femoral, popliteal, proximal greater saphenous, and proximal deep femoral veinsare patent and free of thrombus bilaterally.    Calf veins: Bilateral calf edema. There is thrombus in the right posterior tibial veins. The right peroneal veins could not be well visualized. The left posterior tibial and peroneal veins are patent.    IMPRESSION:  There is deep venous thrombosis of the right popliteal vein, right posterior tibial veins and left greater saphenous vein at the saphenofemoral junction.    < end of copied text >            Vital Signs Last 24 Hrs  T(C): 37.1 (15 Oct 2020 08:44), Max: 37.1 (15 Oct 2020 08:44)  T(F): 98.7 (15 Oct 2020 08:44), Max: 98.7 (15 Oct 2020 08:44)  HR: 69 (15 Oct 2020 08:44) (69 - 96)  BP: 149/70 (15 Oct 2020 08:44) (140/63 - 158/69)  BP(mean): --  RR: 17 (15 Oct 2020 08:44) (17 - 18)  SpO2: 98% (15 Oct 2020 08:44) (94% - 98%)    REVIEW OF SYSTEMS: per HPI        Physical Exam:  89 yo  gentleman lying in semi Delgado's position, no acute complaints    Head: normocephalic, atraumatic    Eyes: PERRLA, EOMI, no nystagmus, sclera anicteric    ENT: nasal discharge, uvula midline, no oropharyngeal erythema/exudate    Neck: supple, negative JVD, negative carotid bruits, no thyromegaly    Chest: CTA bilaterally, neg wheeze/ rhonchi/ rales/ crackles/ egophany    Cardiovascular: regular rate and rhythm, neg murmurs/rubs/gallops    Abdomen: soft, non distended, non tender to palpation in all 4 quadrants, negative rebound/guarding, normal bowel sounds    Extremities: NP LE edema, negative calf tenderness to palpation, negative Zack's sign    Musculoskeletal:      :     Neurologic Exam:    Alert and oriented to person, place, date/year, speech fluent w/o dysarthria, recent and remote memory intact, repetition intact, comprehension intact,  attention/concentration intact, fund of knowledge appropriate    Cranial Nerves:     II:                       pupils equal, round and reactive to light, visual fields intact   III/ IV/VI:            extraocular movements intact, neg nystagmus, neg ptosis  V:                       facial sensation intact, V1-3 normal  VII:                     face symmetric, no droop, normal eye closure and smile  VIII:                    hearing intact to finger rub bilaterally  IX/ X:                 soft palate rise symmetrical  XI:                      head turning, shoulder shrug normal  XII:                     tongue midline    Motor Exam:    Upper Extremities:     Right:   no focal weakness > 3+/5              pronator drift: neg    Left:      no focal weakness > 3+/5             pronator drift: neg      Lower Extremities:    Right:   no focal weakness > 3+/5    Left :     no focal weakness > 3+/5               Sensation: Intact to light touch bilaterally                     DTR:            = biceps/     triceps/     brachioradialis                      = patella/   medial hamstring/ankle                      neg clonus                      neg Babinski                          Gait:  not tested        PM&R Impression:    1) deconditioned  2) no focal weakness  3) sepsis  4) B/L LE DVT's    Plan:    1) Physical therapy focusing on therapeutic exercises, bed mobility/transfer out of bed evaluation, progressive ambulation with assistive devices prn.    2) Anticipated Disposition Plan/Recs:   pending functional progress

## 2020-10-15 NOTE — PROGRESS NOTE ADULT - ATTENDING COMMENTS
patient's acute phase reactants decreasing on abx  found to have +GNR bacteremia, unclear source - completing gadolinium scan  repeat cultures in process  plan for picc and iv abx for completion of course upon discharge    also found to have DVT - transitioned to eliquis PO   hemoglobin stable thus far    restarting amlodipine for htn

## 2020-10-15 NOTE — CONSULT NOTE ADULT - ASSESSMENT
89M Zambian-speaking with PMHx HTN, DM2, HLD, PAD s/p R angio/sfa stent (8/2015) w/ confirmed diagnostic angiogram (6/2020) on asa/plavix, chronic LE edema, BPH, presenting with rigors and generalized weakness.     Recommendations:  - Discontinue plavix. Continue with ASA/eliquis.  - discussed with Chief on call  - call x [phone] with questions 89M Irish-speaking with PMHx HTN, DM2, HLD, PAD s/p R angio/sfa stent (8/2015) w/ confirmed diagnostic angiogram (6/2020) on asa/plavix, chronic LE edema, BPH, presenting with rigors and generalized weakness.     Recommendations:  - Discontinue plavix. Continue with ASA/eliquis.  - Vascular signing-off.  - discussed with Chief on call  - call x [phone] with questions

## 2020-10-15 NOTE — CONSULT NOTE ADULT - ASSESSMENT
per Internal Medicine    89yo M with PMH of HTN, DM2, HLD, PAD s/p R angio/sfa stent (8/2015) on asa/plavix, chronic LE edema, BPH, presented with rigors and generalized weakness 3 days ago, otherwise asymptomatic however SIRS-positive (WBC, febrile 102F, tachycardic), empirically being treated for sepsis of undetermined source, bilateral DVTs in r popliteal artery and left great saphenous femoral junction found on doppler being treated with lovenox 80mg.     Problem/Plan - 1:  ·  Problem: Severe sepsis.  Plan: Pt found to be in severe sepsis on admission with T102.8F, HR >90, and with lactate 4.7 (source unknown). UA negative, CT Chest negative for PE or consolidations in the ED. Denies cough, runny nose, sore throat, abdominal pain, n/v/d/c, dysuria. CT/PE negative for PE, consolidation. CT A/P with contrast negative for infectious pathology. WBC 7.83 , Procalcitonin 43.41, ESR 45, CRP 3.56, blood culture speciation returned positive for E. Coli   -Perform Gallium Scan   -Trend WBCs   -d/c Zosyn started Ceftriaxone 1000mg IV q24hrs.     Problem/Plan - 2:  ·  Problem: Bacteremia.  Plan: Pt found to be in severe sepsis on admission with T102.8F, HR >90, and with lactate 4.7 (source unknown). UA negative, CT Chest negative for PE or consolidations in the ED. Denies cough, runny nose, sore throat, abdominal pain, n/v/d/c, dysuria. CT/PE negative for PE, consolidation. CT A/P with contrast negative for infectious pathology.  blood culture speciation returned positive for E. Coli   -d/c Zosyn started Ceftriaxone 1000mg IV q24hrs.     Problem/Plan - 3:  ·  Problem: DVT (deep venous thrombosis).  Plan: Pt has h/o left sfa stent and found to have D-Dimer of 1943 on admission with b/l LE pitting edema on exam, non tender non erythematous shins b/l bilateral DVTs in r popliteal artery and left great saphenous femoral junction found on doppler   -d/c Lovenox 80mg treatment  -Start Eliquis  -Obtain Vascular consult.     Problem/Plan - 4:  ·  Problem: Hypertension.  Plan: Problem: Hypertension.  Plan: Pt has known hx of HTN and takes Amlodipine 10mg + Lisinopril 10mg at home. Patient's last blood pressure read 151/69  -Start Amlodipine 5mg  -Hold Lisinopril.     Problem/Plan - 5:  ·  Problem: Stage 3 chronic kidney disease, unspecified whether stage 3a or 3b CKD.  Plan: Pt found to have an elevated Cr of 1.47. Baseline is unknown but prior records show Cr as high as 1.6. Denies changes in urination, hematuria, dysuria.  - Trend daily BMP.     Problem/Plan - 6:  Problem: HLD (hyperlipidemia). Plan: Pt has known hx HLD and takes pravastatin 20mg at home  - c/w Atorvastatin 10mg qhs via therapeutic interchange.    Problem/Plan - 7:  ·  Problem: BPH (benign prostatic hyperplasia).  Plan: Pt has known hx of BPH and takes Flomax 0.4mg at home.  - c/w Tamsulosin 0.4mg.     Problem/Plan - 8:  ·  Problem: Thyroid nodule incidentally noted on imaging study.  Plan: Pt found to have incidental thyroid nodule on CT chest.  - Refer for outpatient follow-up.     Problem/Plan - 9:  ·  Problem: Renal cyst.  Plan: Pt found to have incidental renal cyst on CT chest.  - Refer for outpatient follow-up.     Problem/Plan - 10:  Problem: Nutrition, metabolism, and development symptoms. Plan; F: none  E: replete PRN  N: DASH/TLC  GI ppx:  Code status:

## 2020-10-15 NOTE — PROGRESS NOTE ADULT - PROBLEM SELECTOR PLAN 6
Pt has known hx HLD and takes pravastatin 20mg at home  - c/w Atorvastatin 10mg qhs via therapeutic interchange.

## 2020-10-16 ENCOUNTER — TRANSCRIPTION ENCOUNTER (OUTPATIENT)
Age: 85
End: 2020-10-16

## 2020-10-16 VITALS
HEART RATE: 76 BPM | TEMPERATURE: 98 F | SYSTOLIC BLOOD PRESSURE: 178 MMHG | OXYGEN SATURATION: 98 % | DIASTOLIC BLOOD PRESSURE: 69 MMHG | RESPIRATION RATE: 18 BRPM

## 2020-10-16 LAB
-  AMPICILLIN/SULBACTAM: SIGNIFICANT CHANGE UP
-  AMPICILLIN: SIGNIFICANT CHANGE UP
-  CEFAZOLIN: SIGNIFICANT CHANGE UP
-  CEFTRIAXONE: SIGNIFICANT CHANGE UP
-  CIPROFLOXACIN: SIGNIFICANT CHANGE UP
-  GENTAMICIN: SIGNIFICANT CHANGE UP
-  PIPERACILLIN/TAZOBACTAM: SIGNIFICANT CHANGE UP
-  TOBRAMYCIN: SIGNIFICANT CHANGE UP
-  TRIMETHOPRIM/SULFAMETHOXAZOLE: SIGNIFICANT CHANGE UP
ALBUMIN SERPL ELPH-MCNC: 3.2 G/DL — LOW (ref 3.3–5)
ALP SERPL-CCNC: 105 U/L — SIGNIFICANT CHANGE UP (ref 40–120)
ALT FLD-CCNC: 27 U/L — SIGNIFICANT CHANGE UP (ref 10–45)
ANION GAP SERPL CALC-SCNC: 11 MMOL/L — SIGNIFICANT CHANGE UP (ref 5–17)
AST SERPL-CCNC: 27 U/L — SIGNIFICANT CHANGE UP (ref 10–40)
BILIRUB SERPL-MCNC: 0.2 MG/DL — SIGNIFICANT CHANGE UP (ref 0.2–1.2)
BUN SERPL-MCNC: 18 MG/DL — SIGNIFICANT CHANGE UP (ref 7–23)
CALCIUM SERPL-MCNC: 8.8 MG/DL — SIGNIFICANT CHANGE UP (ref 8.4–10.5)
CHLORIDE SERPL-SCNC: 109 MMOL/L — HIGH (ref 96–108)
CO2 SERPL-SCNC: 22 MMOL/L — SIGNIFICANT CHANGE UP (ref 22–31)
CREAT SERPL-MCNC: 1.47 MG/DL — HIGH (ref 0.5–1.3)
GLUCOSE BLDC GLUCOMTR-MCNC: 109 MG/DL — HIGH (ref 70–99)
GLUCOSE BLDC GLUCOMTR-MCNC: 120 MG/DL — HIGH (ref 70–99)
GLUCOSE BLDC GLUCOMTR-MCNC: 207 MG/DL — HIGH (ref 70–99)
GLUCOSE SERPL-MCNC: 104 MG/DL — HIGH (ref 70–99)
HCT VFR BLD CALC: 32 % — LOW (ref 39–50)
HGB BLD-MCNC: 9.8 G/DL — LOW (ref 13–17)
MAGNESIUM SERPL-MCNC: 2 MG/DL — SIGNIFICANT CHANGE UP (ref 1.6–2.6)
MCHC RBC-ENTMCNC: 28.1 PG — SIGNIFICANT CHANGE UP (ref 27–34)
MCHC RBC-ENTMCNC: 30.6 GM/DL — LOW (ref 32–36)
MCV RBC AUTO: 91.7 FL — SIGNIFICANT CHANGE UP (ref 80–100)
METHOD TYPE: SIGNIFICANT CHANGE UP
NRBC # BLD: 0 /100 WBCS — SIGNIFICANT CHANGE UP (ref 0–0)
PLATELET # BLD AUTO: 205 K/UL — SIGNIFICANT CHANGE UP (ref 150–400)
POTASSIUM SERPL-MCNC: 4.3 MMOL/L — SIGNIFICANT CHANGE UP (ref 3.5–5.3)
POTASSIUM SERPL-SCNC: 4.3 MMOL/L — SIGNIFICANT CHANGE UP (ref 3.5–5.3)
PROT SERPL-MCNC: 6.4 G/DL — SIGNIFICANT CHANGE UP (ref 6–8.3)
RBC # BLD: 3.49 M/UL — LOW (ref 4.2–5.8)
RBC # FLD: 14.1 % — SIGNIFICANT CHANGE UP (ref 10.3–14.5)
SODIUM SERPL-SCNC: 142 MMOL/L — SIGNIFICANT CHANGE UP (ref 135–145)
WBC # BLD: 6.72 K/UL — SIGNIFICANT CHANGE UP (ref 3.8–10.5)
WBC # FLD AUTO: 6.72 K/UL — SIGNIFICANT CHANGE UP (ref 3.8–10.5)

## 2020-10-16 PROCEDURE — 78830 RP LOCLZJ TUM SPECT W/CT 1: CPT | Mod: 26

## 2020-10-16 PROCEDURE — 78802 RP LOCLZJ TUM WHBDY 1 D IMG: CPT | Mod: 26

## 2020-10-16 PROCEDURE — 99239 HOSP IP/OBS DSCHRG MGMT >30: CPT | Mod: GC

## 2020-10-16 RX ORDER — CLOPIDOGREL BISULFATE 75 MG/1
1 TABLET, FILM COATED ORAL
Qty: 0 | Refills: 0 | DISCHARGE

## 2020-10-16 RX ADMIN — Medication 325 MILLIGRAM(S): at 14:55

## 2020-10-16 RX ADMIN — CEFTRIAXONE 100 MILLIGRAM(S): 500 INJECTION, POWDER, FOR SOLUTION INTRAMUSCULAR; INTRAVENOUS at 17:27

## 2020-10-16 RX ADMIN — APIXABAN 10 MILLIGRAM(S): 2.5 TABLET, FILM COATED ORAL at 06:19

## 2020-10-16 RX ADMIN — AMLODIPINE BESYLATE 5 MILLIGRAM(S): 2.5 TABLET ORAL at 06:18

## 2020-10-16 RX ADMIN — Medication 81 MILLIGRAM(S): at 14:56

## 2020-10-16 RX ADMIN — APIXABAN 10 MILLIGRAM(S): 2.5 TABLET, FILM COATED ORAL at 17:27

## 2020-10-16 NOTE — PROGRESS NOTE ADULT - PROBLEM SELECTOR PLAN 1
Pt found to be in severe sepsis on admission with T102.8F, HR >90, and with lactate 4.7 (source unknown). UA negative, CT Chest negative for PE or consolidations in the ED. Denies cough, runny nose, sore throat, abdominal pain, n/v/d/c, dysuria. CT/PE negative for PE, consolidation. CT A/P with contrast negative for infectious pathology.   - This AM WBC 7.83, Procalcitonin 43.41, ESR 45, CRP 3.56,   - blood culture speciation returned positive for E. Coli   - Obtain Gallium Scan Read  -Trend WBCs   -Continue Ceftriaxone 1000mg IV q24hrs Pt found to be in severe sepsis on admission with T102.8F, HR >90, and with lactate 4.7 (source unknown). UA negative, CT Chest negative for PE or consolidations in the ED. Denies cough, runny nose, sore throat, abdominal pain, n/v/d/c, dysuria. CT/PE negative for PE, consolidation. CT A/P with contrast negative for infectious pathology.   - This AM WBC 6.72   - blood culture speciation returned positive for E. Coli   - Obtain Gallium Scan Read  -Trend WBCs   -Continue Ceftriaxone 1000mg IV q24hrs Pt found to be in severe sepsis on admission with T102.8F, HR >90, and with lactate 4.7 (source unknown). UA negative, CT Chest negative for PE or consolidations in the ED. Denies cough, runny nose, sore throat, abdominal pain, n/v/d/c, dysuria. CT/PE negative for PE, consolidation. CT A/P with contrast negative for infectious pathology.   -Gallium Scan negative for infectious source  - This AM WBC 6.72   - blood culture speciation returned positive for E. Coli     -Trend WBCs   -Continue Ceftriaxone 1000mg IV q24hrs Pt found to be in severe sepsis on admission with T102.8F, HR >90, and with lactate 4.7 (source unknown). UA negative, CT Chest negative for PE or consolidations in the ED. Denies cough, runny nose, sore throat, abdominal pain, n/v/d/c, dysuria. CT/PE negative for PE, consolidation. CT A/P with contrast negative for infectious pathology.   -Gallium Scan negative for infectious source  - This AM WBC 6.72   - blood culture speciation returned positive for E. Coli   -Discharge with PO Cipro x 14 days     -Trend WBCs   -Continue Ceftriaxone 1000mg IV q24hrs

## 2020-10-16 NOTE — PROGRESS NOTE ADULT - PROBLEM SELECTOR PLAN 8
Pt found to have incidental renal cyst on CT chest.  - Refer for outpatient follow-up. Pt found to have incidental thyroid nodule on CT chest.  - Refer for outpatient follow-up.

## 2020-10-16 NOTE — CONSULT NOTE ADULT - SUBJECTIVE AND OBJECTIVE BOX
Vascular Access Service Consult Note    89yMaleHEALTH ISSUES - PROBLEM Dx:    Diagnosis: bacteremia    Indications for Vascular Access (Check all that apply)  [ x ]  Antibiotic Therapy       Antibiotic Prescribed:   ceftriaxone x  2 weeks                                                                                        [  ]  IV Hydration  [  ]  Total Parenteral Nutrition  [  ]  Chemotherapy  [  ]  Difficult Venous Access  [  ]  CVP monitoring  [  ]  Medications with high potential for tissue necrosis on extravasation  [  ]  Other    Screening (Check all that apply)  Previous Radiation to chest  [  ] Yes      [ x ]  No  Breast Cancer                          [  ] Left     [  ]  Right    [x  ]  No  Lymph Node Dissection         [  ] Left     [  ]  Right    [x  ]  No  Pacemaker or ICD                   [  ] Left     [  ]  Right    [x  ]  No  Upper Extremity DVT             [  ] Left     [  ]  Right    [ x ]  No  Chronic Kidney Disease         [  ]  Yes     [ x ]  No  Hemodialysis                           [  ]  Yes     [x  ]  No  AV Fistula/ Graft                     [  ]  Left    [  ]  Right    [x  ]  No  Temp>101F in past 24 H       [  ]  Yes     [x  ]  No  H/O PICC/Midline                   [  ]  Yes     [ x ]  No    Lab data:                        9.8    6.72  )-----------( 205      ( 16 Oct 2020 06:17 )             32.0     10-16    142  |  109<H>  |  18  ----------------------------<  104<H>  4.3   |  22  |  1.47<H>    Ca    8.8      16 Oct 2020 06:17  Mg     2.0     10-16    TPro  6.4  /  Alb  3.2<L>  /  TBili  0.2  /  DBili  x   /  AST  27  /  ALT  27  /  AlkPhos  105  10-16              I have reviewed the chart, interviewed and examined the patient and determined that this patient:  [  ] Is a candidate for a PICC line  [ x ] Is a candidate for a Midline  [  ] Is not a candidate for vascular access device (reason)    Lumens:    [ x ] Single  [  ] Double

## 2020-10-16 NOTE — DISCHARGE NOTE PROVIDER - NSDCCPCAREPLAN_GEN_ALL_CORE_FT
PRINCIPAL DISCHARGE DIAGNOSIS  Diagnosis: Septic shock  Assessment and Plan of Treatment:       SECONDARY DISCHARGE DIAGNOSES  Diagnosis: Generalized weakness  Assessment and Plan of Treatment:      PRINCIPAL DISCHARGE DIAGNOSIS  Diagnosis: Septic shock  Assessment and Plan of Treatment: You were admitted to the hospital due to sepsis. Sepsis is a serious illness that happens when an infection travels through the whole body. Sepsis can happen in anyone, but it is more likely to happen in people who: Are older or bedridden, staying in the hospital or have had recent surgery, or have a weak infection-fighting system (for example because they are being treated for cancer). Symptoms of sepsis can include fevers, chills, shortness of breath, fast heart rate. Sepsis and septic shock are usually treated in the hospital with antibiotics that go in your vein through a thin tube called an "IV". Intravenous fluids are also given. Other medicines are also often given in order to treat sepsis, including medicines to increase blood pressure if it is too low. If an IV or catheter in your body is causing your sepsis, your doctor might take the IV or catheter out. Some people are also treated with surgery. If you have a severe infection of the skin or tissue under the skin, your doctor might do surgery to remove the infected areas. Some people with severe septic shock might need a blood transfusion. A blood transfusion is when a person gets blood that was given (donated) by another person. But this is rare.  You had sepsis caused by a bacterial infection in your blood. You were given IV antibiotics to treat this. You had imaging done to look for the source of the infection in your blood. Based on the imaging, it is unclear what caused your infection. You will be discharged on oral antibiotics. Please be sure to complete the full course of antibiotics. Please confirm an appointment with infectious diseases doctor, Dr. Thompson, after being discharged from the hospital.        SECONDARY DISCHARGE DIAGNOSES  Diagnosis: Generalized weakness  Assessment and Plan of Treatment:

## 2020-10-16 NOTE — PROGRESS NOTE ADULT - ASSESSMENT
89yo M with PMH of HTN, DM2, HLD, PAD s/p R angio/sfa stent (8/2015) on asa/plavix, chronic LE edema, BPH, presented with rigors and generalized weakness 4 days ago, otherwise asymptomatic however SIRS-positive (WBC, febrile 102F, tachycardic), being treated for sepsis in the setting of E.Coli bacteremia with Ceftriaxone pending workup of infectious source, bilateral DVTs in r popliteal artery and left great saphenous femoral junction found on doppler being treated with eliquis. 89yo M with PMH of HTN, DM2, HLD, PAD s/p R angio/sfa stent (8/2015) on asa/plavix, chronic LE edema, BPH, presented with rigors and generalized weakness 4 days ago, otherwise asymptomatic however SIRS-positive (WBC, febrile 102F, tachycardic), being treated for sepsis in the setting of E.Coli bacteremia with Ceftriaxone with no infectious source found on Gallium scan, bilateral DVTs in r popliteal artery and left great saphenous femoral junction found on doppler being treated with eliquis.

## 2020-10-16 NOTE — DISCHARGE NOTE PROVIDER - NSDCFUSCHEDAPPT_GEN_ALL_CORE_FT
LORNA RICHARDSON ; 11/09/2020 ; [facility] [address] LORNA RICHARDSON ; 11/02/2020 ; [facility]  [facility] [address]   LORNA RICHARDSON ; 11/09/2020 ; [facility] [address]

## 2020-10-16 NOTE — PROGRESS NOTE ADULT - PROBLEM SELECTOR PLAN 2
Pt found to be in severe sepsis on admission with T102.8F, HR >90, and with lactate 4.7 (source unknown). UA negative, CT Chest negative for PE or consolidations in the ED. Denies cough, runny nose, sore throat, abdominal pain, n/v/d/c, dysuria. CT/PE negative for PE, consolidation. CT A/P with contrast negative for infectious pathology.  blood culture speciation returned positive for E. Coli   -d/c Zosyn started Ceftriaxone 1000mg IV q24hrs. Pt found to be in severe sepsis on admission with T102.8F, HR >90, and with lactate 4.7 (source unknown). UA negative, CT Chest negative for PE or consolidations in the ED. Denies cough, runny nose, sore throat, abdominal pain, n/v/d/c, dysuria. CT/PE negative for PE, consolidation. CT A/P with contrast negative for infectious pathology.  blood culture speciation returned positive for E. Coli   - Continue Ceftriaxone 1000mg IV q24hrs.

## 2020-10-16 NOTE — PROGRESS NOTE ADULT - ASSESSMENT
per Internal Medicine    89yo M with PMH of HTN, DM2, HLD, PAD s/p R angio/sfa stent (8/2015) on asa/plavix, chronic LE edema, BPH, presented with rigors and generalized weakness 3 days ago, otherwise asymptomatic however SIRS-positive (WBC, febrile 102F, tachycardic), empirically being treated for sepsis of undetermined source, bilateral DVTs in r popliteal artery and left great saphenous femoral junction found on doppler being treated with lovenox 80mg.     Problem/Plan - 1:  ·  Problem: Severe sepsis.  Plan: Pt found to be in severe sepsis on admission with T102.8F, HR >90, and with lactate 4.7 (source unknown). UA negative, CT Chest negative for PE or consolidations in the ED. Denies cough, runny nose, sore throat, abdominal pain, n/v/d/c, dysuria. CT/PE negative for PE, consolidation. CT A/P with contrast negative for infectious pathology.   - This AM WBC 7.83, Procalcitonin 43.41, ESR 45, CRP 3.56,   - blood culture speciation returned positive for E. Coli   - f/u Gallium Scan today  -Trend WBCs   -d/c'ed Zosyn and de-escalated to Ceftriaxone 2000mg IV q24hrs today.    Problem/Plan - 2:  ·  Problem: Bacteremia.  Plan: Pt found to be in severe sepsis on admission with T102.8F, HR >90, and with lactate 4.7 (source unknown). UA negative, CT Chest negative for PE or consolidations in the ED. Denies cough, runny nose, sore throat, abdominal pain, n/v/d/c, dysuria. CT/PE negative for PE, consolidation. CT A/P with contrast negative for infectious pathology.  blood culture speciation returned positive for E. Coli   -d/c Zosyn started Ceftriaxone 1000mg IV q24hrs.     Problem/Plan - 3:  ·  Problem: DVT (deep venous thrombosis).  Plan: Pt has h/o left sfa stent and found to have D-Dimer of 1943 on admission with b/l LE pitting edema on exam, non tender non erythematous shins b/l bilateral DVTs in r popliteal artery and left great saphenous femoral junction found on doppler   -d/c Lovenox 80mg treatment  -Start Eliquis 10mg q12h loading dose  -per vascular, discontinuing plavix to avoid triple therapy.     Problem/Plan - 4:  ·  Problem: Hypertension.  Plan: Problem: Hypertension.  Plan: Pt has known hx of HTN and takes Amlodipine 10mg + Lisinopril 10mg at home. Patient's last blood pressure read 151/69  -Start Amlodipine 5mg (home dose of 10mg)  -continue to hold Lisinopril.     Problem/Plan - 5:  ·  Problem: Stage 3 chronic kidney disease, unspecified whether stage 3a or 3b CKD.  Plan: Pt found to have an elevated Cr of 1.47. Baseline is unknown but prior records show Cr as high as 1.6. Denies changes in urination, hematuria, dysuria.  - Trend daily BMP.     Problem/Plan - 6:  Problem: HLD (hyperlipidemia). Plan: Pt has known hx HLD and takes pravastatin 20mg at home  - c/w Atorvastatin 10mg qhs via therapeutic interchange.    Problem/Plan - 7:  ·  Problem: BPH (benign prostatic hyperplasia).  Plan: Pt has known hx of BPH and takes Flomax 0.4mg at home.  - c/w Tamsulosin 0.4mg.     Problem/Plan - 8:  ·  Problem: Thyroid nodule incidentally noted on imaging study.  Plan: Pt found to have incidental thyroid nodule on CT chest.  - Refer for outpatient follow-up.     Problem/Plan - 9:  ·  Problem: Renal cyst.  Plan: Pt found to have incidental renal cyst on CT chest.  - Refer for outpatient follow-up.     Problem/Plan - 10:  Problem: Nutrition, metabolism, and development symptoms. Plan; F: none  E: replete PRN  N: DASH/TLC  GI ppx:  Code status:

## 2020-10-16 NOTE — DISCHARGE NOTE PROVIDER - CARE PROVIDER_API CALL
Devendra Fang  INTERNAL MEDICINE  [address] [place] [address]  Phone: [phone]  Fax: [phone]  Follow Up Time: 1 week   Devendra Fang  INTERNAL MEDICINE  [address] [place] [address]  Phone: [phone]  Fax: [phone]  Follow Up Time: 1 week    Neto Thompson  INTERNAL MEDICINE  [address] [place] [address]  Phone: [phone]  Fax: [phone]  Follow Up Time: 1 week   Devendra Fang  INTERNAL MEDICINE  [address] [place] [address]  Phone: [phone]  Fax: [phone]  Scheduled Appointment: 11/09/2020 12:00 PM    Neto Thompson  INTERNAL MEDICINE  [address] [place] [address]  Phone: [phone]  Fax: [phone]  Follow Up Time: 1 week

## 2020-10-16 NOTE — PROGRESS NOTE ADULT - SUBJECTIVE AND OBJECTIVE BOX
Physical Medicine and Rehabilitation Progress Note:    Patient is a 89y old  Male who presents with a chief complaint of severe sepsis 2/2 unknown source (15 Oct 2020 15:58)      HPI:  89M German-speaking with PMHx HTN, DM2, HLD, PAD s/p R angio/sfa stent (8/2015) w/ confirmed patent sfa stent on angiogram (6/2020) on asa/plavix, chronic LE edema, BPH, presenting with rigors and generalized weakness. Per patient and wife, symptoms started today. Pt also reports achy low back pain (which is chronic ~ 2years, with no associated trauma) and foot pain when ambulating. Denies HA, dizziness, lightheadedness, neck pain, neck stiffness, URI symptoms, cough, CP, chest tightness, abd pain, N/V/D, no hematochezia/melena, no dysuria, no rash, no back pain, no extremity weakness/numbness/paresthesia, no saddle anesthesia, no urinary/fecal incontinence/retention, no recent travel, no sick contacts, no prior covid, no trauma.     ED Vitals: Tmax 102.8F, HR 90s-120s, BP 100s-140s/50s-60s RR 20 SpO2 95-98% RA  ED labs: WBC wnl, H/H 10.7/33.6, Cr 1.47, D-dimer 1943, Lactate 4.7 --> 2.0 COVID- UA- Procalcitonin 7, Ferritin 16  EKG: sinus tachycardia, left axis deviation, RBBB, QTc 478  CT PE: no PE, hypodense nodules in thyroid  ED Course: Tylenol 650mg x2, 2.5L NS, Zosyn 3.375g       (13 Oct 2020 01:56)                            9.8    6.72  )-----------( 205      ( 16 Oct 2020 06:17 )             32.0       10-16    142  |  109<H>  |  18  ----------------------------<  104<H>  4.3   |  22  |  1.47<H>    Ca    8.8      16 Oct 2020 06:17  Mg     2.0     10-16    TPro  6.4  /  Alb  3.2<L>  /  TBili  0.2  /  DBili  x   /  AST  27  /  ALT  27  /  AlkPhos  105  10-16    Vital Signs Last 24 Hrs  T(C): 36.7 (16 Oct 2020 06:31), Max: 36.9 (15 Oct 2020 15:30)  T(F): 98 (16 Oct 2020 06:31), Max: 98.5 (15 Oct 2020 15:30)  HR: 71 (16 Oct 2020 06:31) (71 - 80)  BP: 146/62 (16 Oct 2020 06:31) (145/65 - 164/81)  BP(mean): --  RR: 18 (16 Oct 2020 06:31) (18 - 18)  SpO2: 94% (16 Oct 2020 06:31) (94% - 100%)    MEDICATIONS  (STANDING):  amLODIPine   Tablet 5 milliGRAM(s) Oral daily  apixaban 10 milliGRAM(s) Oral every 12 hours  aspirin enteric coated 81 milliGRAM(s) Oral daily  atorvastatin 10 milliGRAM(s) Oral at bedtime  cefTRIAXone   IVPB 2000 milliGRAM(s) IV Intermittent every 24 hours  dextrose 5%. 1000 milliLiter(s) (50 mL/Hr) IV Continuous <Continuous>  dextrose 50% Injectable 12.5 Gram(s) IV Push once  dextrose 50% Injectable 25 Gram(s) IV Push once  dextrose 50% Injectable 25 Gram(s) IV Push once  ferrous    sulfate 325 milliGRAM(s) Oral daily  influenza  Vaccine (HIGH DOSE) 0.7 milliLiter(s) IntraMuscular once  insulin glargine Injectable (LANTUS) 8 Unit(s) SubCutaneous at bedtime  insulin lispro (HumaLOG) corrective regimen sliding scale   SubCutaneous Before meals and at bedtime  tamsulosin 0.4 milliGRAM(s) Oral at bedtime    MEDICATIONS  (PRN):  dextrose 40% Gel 15 Gram(s) Oral once PRN Blood Glucose LESS THAN 70 milliGRAM(s)/deciliter  glucagon  Injectable 1 milliGRAM(s) IntraMuscular once PRN Glucose LESS THAN 70 milligrams/deciliter    Currently Undergoing Physical/ Occupational Therapy at bedside.    Functional Status Assessment:   10/15/2020    Previous Level of Function:     · Ambulation Skills	independent; needs device  · Transfer Skills	independent; needs device  · ADL Skills	independent; needs device  · Work/Leisure Activity	independent; needs device  · Additional Comments	pt uses a cane at baseline, 4 steps to enter    Cognitive Status Examination:   · Orientation	oriented to person, place, time and situation  · Level of Consciousness	alert  · Follows Commands and Answers Questions	100% of the time  · Personal Safety and Judgment	intact    Range of Motion Exam:   · Range of Motion Examination	no ROM deficits were identified    Manual Muscle Testing:   · Manual Muscle Testing Results	no strength deficits were identified     Bed Mobility: Sit to Supine:     · Level of Cooper	independent    Bed Mobility: Supine to Sit:     · Level of Cooper	independent    Transfer: Sit to Stand:     · Level of Cooper	independent  · Weight-Bearing Restrictions	full weight-bearing    Transfer: Stand to Sit:     · Level of Cooper	independent  · Weight-Bearing Restrictions	full weight-bearing    Gait Skills:     · Level of Cooper	independent  · Weight-Bearing Restrictions	full weight-bearing  · Assistive Device	straight cane  · Gait Distance	200 feet    Gait Analysis:     · Gait Pattern Used	2-point gait     Stair Negotiation:     · Level of Cooper	independent  · Weight-Bearing Restrictions	full weight-bearing  · Assistive Device	right rail up; right rail down  · Stair Pattern	step over step  · Number of Stairs	4    Balance Skills Assessment:     · Sitting Balance: Static	good balance   · Sitting Balance: Dynamic	good balance   · Sit-to-Stand Balance	good balance   · Standing Balance: Static	good balance   · Standing Balance: Dynamic	good balance     Sensory Examination:   Sensory Examination:    Grossly Intact:   · Gross Sensory Examination	Grossly Intact        PM&R Impression: as above    Current Disposition Plan :   subacute rehab placement

## 2020-10-16 NOTE — DISCHARGE NOTE NURSING/CASE MANAGEMENT/SOCIAL WORK - PATIENT PORTAL LINK FT
You can access the FollowMyHealth Patient Portal offered by [facility] by registering at the following website: [email or web address]. By joining [employer] [employer]s FollowMyHealth portal, you will also be able to view your health information using other applications (apps) compatible with our system.

## 2020-10-16 NOTE — DISCHARGE NOTE PROVIDER - HOSPITAL COURSE
#Discharge: do not delete    Patient is __ yo M/F with past medical history of _____  Presented with _____, found to have _____  Problem List/Main Diagnoses (system-based):   Inpatient treatment course:   New medications:   Labs to be followed outpatient:   Exam to be followed outpatient:    #Discharge: do not delete    Patient is 89yo M with past medical history of HTN, DM2, HLD, PAD s/p R angio/sfa stent (8/2015) on asa/plavix, chronic LE edema, and BPH.  Presented with rigors and generalized weakness 4 days ago, found to have sepsis 2/2 E. coli bacteremia of unclear source.    Problem List/Main Diagnoses (system-based):   1) Severe sepsis  Pt found to be in severe sepsis on admission with T102.8F, HR >90, and with lactate 4.7 (source unknown). UA negative, CT Chest negative for PE or consolidations in the ED. Denies cough, runny nose, sore throat, abdominal pain, n/v/d/c, dysuria. CT/PE negative for PE, consolidation. CT A/P with contrast negative for infectious pathology.   - WBC trended down to wnl  - Gallium Scan negative for infectious source  - blood culture speciation returned positive for E. Coli bacteremia  - was on IV zosyn later switched to ceftriaxone  - Discharge on PO Cipro 500mg q12h for 10 days (14 day total course of antibiotics)    2) DVT  Pt has h/o left sfa stent and found to have D-Dimer of 1943 on admission with b/l LE pitting edema on exam, non tender non erythematous shins b/l bilateral DVTs in r popliteal artery and left great saphenous femoral junction found on doppler   - Increased home Eliquis to 10mg BID as loading therapeutic dose for 7 days total (will be given 5 day supply on discharge), then will transition to Eliquis 5mg BID for at least 3 months  - to follow-up at [facility]    3) Hypertension  Pt has known hx of HTN and takes Amlodipine 10mg + Lisinopril 10mg at home. Patient's last blood pressure read 151/69  -Start Amlodipine 5mg (home dose of 10mg)  -restarted home lisinopril 10mg QD on discharge    4) Stage 3 chronic kidney disease, unspecified whether stage 3a or 3b CKD  Pt found to have an elevated Cr of ~1.4. Baseline is unknown but prior records show Cr as high as 1.6. Denies changes in urination, hematuria, dysuria.    5) DM (diabetes mellitus)  Pt has known hx of DM2 and takes Glipizide 10mg with Sitagliptin 100mg at home, does not take insulin.   - HgA1C 9.0% (average glucose 212) on admission, monitored on ISS while admitted    6) HLD (hyperlipidemia)  Pt has known hx HLD and takes pravastatin 20mg at home  - c/w home pravastatin    7) Thyroid nodule incidentally noted on imaging study  Pt found to have incidental thyroid nodule on CT chest.  - f/u at [facility]    8) Renal cyst  Pt found to have incidental renal cyst on CT chest.  - Refer for outpatient follow-up      Inpatient treatment course: Extensive imaging done with no source of infection identified. Gallium scan negative. BCx from 10/12 positive for E. coli bacteremia. Patient initially on Zosyn IVPB and then later de-escalated to ceftriaxone. Discharged on ciprofloxacin PO.     New medications: Ciprofloxacin 500mg q12h for 10 days, Eliquis 10mg q12h  Labs to be followed outpatient: n/a  Exam to be followed outpatient: n/a   #Discharge: do not delete    Patient is 89yo M with past medical history of HTN, DM2, HLD, PAD s/p R angio/sfa stent (8/2015) on asa/plavix, chronic LE edema, and BPH.  Presented with rigors and generalized weakness 4 days ago, found to have sepsis 2/2 E. coli bacteremia of unclear source.    Problem List/Main Diagnoses (system-based):   1) Severe sepsis  Pt found to be in severe sepsis on admission with T102.8F, HR >90, and with lactate 4.7 (source unknown). UA negative, CT Chest negative for PE or consolidations in the ED. Denies cough, runny nose, sore throat, abdominal pain, n/v/d/c, dysuria. CT/PE negative for PE, consolidation. CT A/P with contrast negative for infectious pathology.   - WBC trended down to wnl  - Gallium Scan negative for infectious source  - blood culture speciation returned positive for E. Coli bacteremia  - was on IV zosyn later switched to ceftriaxone  - Discharge on PO Cipro 500mg q12h for 10 days (14 day total course of antibiotics)    2) DVT  Pt has h/o left sfa stent and found to have D-Dimer of 1943 on admission with b/l LE pitting edema on exam, non tender non erythematous shins b/l bilateral DVTs in r popliteal artery and left great saphenous femoral junction found on doppler   - Started Eliquis 10mg BID as loading therapeutic dose for 7 days total (will be given 5 day supply on discharge), then will transition to Eliquis 5mg BID for at least 3 months  - Was on home ASA and plavix but per vascular recs, discontinued plavix so to avoid triple therapy while on Eliquis  - to follow-up at [facility]    3) Hypertension  Pt has known hx of HTN and takes Amlodipine 10mg + Lisinopril 10mg at home. Patient's last blood pressure read 151/69  -restarted home amlodipine 10mg QD and lisinopril 10mg QD on discharge    4) Stage 3 chronic kidney disease, unspecified whether stage 3a or 3b CKD  Pt found to have an elevated Cr of ~1.4. Baseline is unknown but prior records show Cr as high as 1.6. Denies changes in urination, hematuria, dysuria.    5) DM (diabetes mellitus)  Pt has known hx of DM2 and takes Glipizide 10mg with Sitagliptin 100mg at home, does not take insulin.   - HgA1C 9.0% (average glucose 212) on admission, monitored on ISS while admitted    6) HLD (hyperlipidemia)  Pt has known hx HLD and takes pravastatin 20mg at home  - c/w home pravastatin    7) Thyroid nodule incidentally noted on imaging study  Pt found to have incidental thyroid nodule on CT chest.  - f/u at [facility]    8) Renal cyst  Pt found to have incidental renal cyst on CT chest.  - Refer for outpatient follow-up      Inpatient treatment course: Extensive imaging done with no source of infection identified. Gallium scan negative. BCx from 10/12 positive for E. coli bacteremia. Patient initially on Zosyn IVPB and then later de-escalated to ceftriaxone. Discharged on ciprofloxacin PO.     New medications: Ciprofloxacin 500mg q12h for 10 days, Eliquis 10mg q12h  *DISCONTINUED home plavix 75mg QD  Labs to be followed outpatient: n/a  Exam to be followed outpatient: n/a   #Discharge: do not delete    Patient is 89yo M with past medical history of HTN, DM2, HLD, PAD s/p R angio/sfa stent (8/2015) on asa/plavix, chronic LE edema, and BPH.  Presented with rigors and generalized weakness 4 days ago, found to have sepsis 2/2 E. coli bacteremia of unclear source.    Problem List/Main Diagnoses (system-based):   1) Sepsis of unclear source  Pt found to be in severe sepsis on admission with T102.8F, HR >90, and with lactate 4.7 (source unknown). UA negative, CT Chest negative for PE or consolidations in the ED. Denies cough, runny nose, sore throat, abdominal pain, n/v/d/c, dysuria. CT/PE negative for PE, consolidation. CT A/P with contrast negative for infectious pathology.   - WBC trended down to wnl  - Gallium Scan negative for infectious source  - blood culture speciation returned positive for E. Coli bacteremia  - was on IV zosyn later switched to ceftriaxone  - Discharge on PO Cipro 500mg q12h for 10 days (14 day total course of antibiotics)    2) DVT  Pt has h/o left sfa stent and found to have D-Dimer of 1943 on admission with b/l LE pitting edema on exam, non tender non erythematous shins b/l bilateral DVTs in r popliteal artery and left great saphenous femoral junction found on doppler   - Started Eliquis 10mg BID as loading therapeutic dose for 7 days total (will be given 5 day supply on discharge), then will transition to Eliquis 5mg BID for at least 3 months  - Was on home ASA and plavix but per vascular recs, discontinued plavix so to avoid triple therapy while on Eliquis  - to follow-up at [facility]    3) Hypertension  Pt has known hx of HTN and takes Amlodipine 10mg + Lisinopril 10mg at home. Patient's last blood pressure read 151/69  -restarted home amlodipine 10mg QD and lisinopril 10mg QD on discharge    4) Stage 3 chronic kidney disease, unspecified whether stage 3a or 3b CKD  Pt found to have an elevated Cr of ~1.4. Baseline is unknown but prior records show Cr as high as 1.6. Denies changes in urination, hematuria, dysuria.    5) DM (diabetes mellitus)  Pt has known hx of DM2 and takes Glipizide 10mg with Sitagliptin 100mg at home, does not take insulin.   - HgA1C 9.0% (average glucose 212) on admission, monitored on ISS while admitted    6) HLD (hyperlipidemia)  Pt has known hx HLD and takes pravastatin 20mg at home  - c/w home pravastatin    7) Thyroid nodule incidentally noted on imaging study  Pt found to have incidental thyroid nodule on CT chest.  - f/u at [facility]    8) Renal cyst  Pt found to have incidental renal cyst on CT chest.  - Refer for outpatient follow-up      Inpatient treatment course: Extensive imaging done with no source of infection identified. Gallium scan negative. BCx from 10/12 positive for E. coli bacteremia. Patient initially on Zosyn IVPB and then later de-escalated to ceftriaxone. Discharged on ciprofloxacin PO.     New medications: Ciprofloxacin 500mg q12h for 10 days, Eliquis 10mg q12h  *DISCONTINUED home plavix 75mg QD  Labs to be followed outpatient: n/a  Exam to be followed outpatient: n/a

## 2020-10-16 NOTE — PROGRESS NOTE ADULT - PROBLEM SELECTOR PLAN 3
Pt has h/o left sfa stent and found to have D-Dimer of 1943 on admission with b/l LE pitting edema on exam, non tender non erythematous shins b/l bilateral DVTs in r popliteal artery and left great saphenous femoral junction found on doppler   -Continue Eliquis

## 2020-10-16 NOTE — PROGRESS NOTE ADULT - PROBLEM SELECTOR PLAN 10
Pt found to have an elevated Cr of 1.47. Baseline is unknown but prior records show Cr as high as 1.6. Denies changes in urination, hematuria, dysuria.  - Trend daily BMP. Problem: Nutrition, metabolism, and development symptoms. Plan; F: none  E: replete PRN  N: DASH/TLC

## 2020-10-16 NOTE — PROGRESS NOTE ADULT - PROBLEM SELECTOR PROBLEM 10
Stage 3 chronic kidney disease, unspecified whether stage 3a or 3b CKD Nutrition, metabolism, and development symptoms

## 2020-10-16 NOTE — PROGRESS NOTE ADULT - PROBLEM SELECTOR PROBLEM 6
HLD (hyperlipidemia)
DM (diabetes mellitus)
HLD (hyperlipidemia)

## 2020-10-16 NOTE — PROGRESS NOTE ADULT - SUBJECTIVE AND OBJECTIVE BOX
OVERNIGHT EVENTS:    SUBJECTIVE / INTERVAL HPI: Patient seen and examined at bedside.     VITAL SIGNS:  Vital Signs Last 24 Hrs  T(C): 36.7 (16 Oct 2020 06:31), Max: 36.9 (15 Oct 2020 15:30)  T(F): 98 (16 Oct 2020 06:31), Max: 98.5 (15 Oct 2020 15:30)  HR: 71 (16 Oct 2020 06:31) (71 - 80)  BP: 146/62 (16 Oct 2020 06:31) (145/65 - 164/81)  BP(mean): --  RR: 18 (16 Oct 2020 06:31) (18 - 18)  SpO2: 94% (16 Oct 2020 06:31) (94% - 100%)    PHYSICAL EXAM:    General: Well developed, well nourished, no acute distress  HEENT: NC/AT; PERRL, anicteric sclera; MMM  Neck: supple  Cardiovascular: +S1/S2, RRR, no murmurs, rubs, gallops  Respiratory: CTA B/L; no W/R/R  Gastrointestinal: soft, NT/ND; +BSx4  Extremities: WWP; no edema, clubbing or cyanosis  Vascular: 2+ radial, DP/PT pulses B/L  Neurological: AAOx3; no focal deficits    MEDICATIONS:  MEDICATIONS  (STANDING):  amLODIPine   Tablet 5 milliGRAM(s) Oral daily  apixaban 10 milliGRAM(s) Oral every 12 hours  aspirin enteric coated 81 milliGRAM(s) Oral daily  atorvastatin 10 milliGRAM(s) Oral at bedtime  cefTRIAXone   IVPB 2000 milliGRAM(s) IV Intermittent every 24 hours  dextrose 5%. 1000 milliLiter(s) (50 mL/Hr) IV Continuous <Continuous>  dextrose 50% Injectable 12.5 Gram(s) IV Push once  dextrose 50% Injectable 25 Gram(s) IV Push once  dextrose 50% Injectable 25 Gram(s) IV Push once  ferrous    sulfate 325 milliGRAM(s) Oral daily  influenza  Vaccine (HIGH DOSE) 0.7 milliLiter(s) IntraMuscular once  insulin glargine Injectable (LANTUS) 8 Unit(s) SubCutaneous at bedtime  insulin lispro (HumaLOG) corrective regimen sliding scale   SubCutaneous Before meals and at bedtime  tamsulosin 0.4 milliGRAM(s) Oral at bedtime    MEDICATIONS  (PRN):  dextrose 40% Gel 15 Gram(s) Oral once PRN Blood Glucose LESS THAN 70 milliGRAM(s)/deciliter  glucagon  Injectable 1 milliGRAM(s) IntraMuscular once PRN Glucose LESS THAN 70 milligrams/deciliter      ALLERGIES:  Allergies    No Known Allergies    Intolerances        LABS:                        9.8    6.72  )-----------( 205      ( 16 Oct 2020 06:17 )             32.0     10-16    142  |  109<H>  |  18  ----------------------------<  104<H>  4.3   |  22  |  1.47<H>    Ca    8.8      16 Oct 2020 06:17  Mg     2.0     10-16    TPro  6.4  /  Alb  3.2<L>  /  TBili  0.2  /  DBili  x   /  AST  27  /  ALT  27  /  AlkPhos  105  10-16        CAPILLARY BLOOD GLUCOSE      POCT Blood Glucose.: 120 mg/dL (16 Oct 2020 12:09)      RADIOLOGY & ADDITIONAL TESTS: Reviewed.    PLAN: OVERNIGHT EVENTS: No acute overnight events     SUBJECTIVE / INTERVAL HPI: Today pt says he is feeling well and denies, fevers, chills, fatigue, cough, SOB, chest pain, palpitations, nausea, vomiting abdominal pain, melena, dysuria, and rash.    VITAL SIGNS:  Vital Signs Last 24 Hrs  T(C): 36.7 (16 Oct 2020 06:31), Max: 36.9 (15 Oct 2020 15:30)  T(F): 98 (16 Oct 2020 06:31), Max: 98.5 (15 Oct 2020 15:30)  HR: 71 (16 Oct 2020 06:31) (71 - 80)  BP: 146/62 (16 Oct 2020 06:31) (145/65 - 164/81)  BP(mean): --  RR: 18 (16 Oct 2020 06:31) (18 - 18)  SpO2: 94% (16 Oct 2020 06:31) (94% - 100%)    PHYSICAL EXAM:    General: Well developed, well nourished, no acute distress  HEENT: NC/AT; PERRL, anicteric sclera; MMM  Neck: supple  Cardiovascular: +S1/S2, RRR, no murmurs, rubs, gallops  Respiratory: CTA B/L; no W/R/R  Gastrointestinal: soft, NT/ND; +BSx4  Extremities: WWP; no edema, clubbing or cyanosis  Vascular: 2+ radial, DP/PT pulses B/L  Neurological: AAOx3; no focal deficits    MEDICATIONS:  MEDICATIONS  (STANDING):  amLODIPine   Tablet 5 milliGRAM(s) Oral daily  apixaban 10 milliGRAM(s) Oral every 12 hours  aspirin enteric coated 81 milliGRAM(s) Oral daily  atorvastatin 10 milliGRAM(s) Oral at bedtime  cefTRIAXone   IVPB 2000 milliGRAM(s) IV Intermittent every 24 hours  dextrose 5%. 1000 milliLiter(s) (50 mL/Hr) IV Continuous <Continuous>  dextrose 50% Injectable 12.5 Gram(s) IV Push once  dextrose 50% Injectable 25 Gram(s) IV Push once  dextrose 50% Injectable 25 Gram(s) IV Push once  ferrous    sulfate 325 milliGRAM(s) Oral daily  influenza  Vaccine (HIGH DOSE) 0.7 milliLiter(s) IntraMuscular once  insulin glargine Injectable (LANTUS) 8 Unit(s) SubCutaneous at bedtime  insulin lispro (HumaLOG) corrective regimen sliding scale   SubCutaneous Before meals and at bedtime  tamsulosin 0.4 milliGRAM(s) Oral at bedtime    MEDICATIONS  (PRN):  dextrose 40% Gel 15 Gram(s) Oral once PRN Blood Glucose LESS THAN 70 milliGRAM(s)/deciliter  glucagon  Injectable 1 milliGRAM(s) IntraMuscular once PRN Glucose LESS THAN 70 milligrams/deciliter      ALLERGIES:  Allergies    No Known Allergies    Intolerances        LABS:                        9.8    6.72  )-----------( 205      ( 16 Oct 2020 06:17 )             32.0     10-16    142  |  109<H>  |  18  ----------------------------<  104<H>  4.3   |  22  |  1.47<H>    Ca    8.8      16 Oct 2020 06:17  Mg     2.0     10-16    TPro  6.4  /  Alb  3.2<L>  /  TBili  0.2  /  DBili  x   /  AST  27  /  ALT  27  /  AlkPhos  105  10-16        CAPILLARY BLOOD GLUCOSE      POCT Blood Glucose.: 120 mg/dL (16 Oct 2020 12:09)      RADIOLOGY & ADDITIONAL TESTS: Reviewed.    PLAN: OVERNIGHT EVENTS: No acute overnight events     SUBJECTIVE / INTERVAL HPI: Today pt says he is feeling well and denies, fevers, chills, fatigue, cough, SOB, chest pain, palpitations, nausea, vomiting abdominal pain, melena, dysuria, and rash.    VITAL SIGNS:  Vital Signs Last 24 Hrs  T(C): 36.7 (16 Oct 2020 06:31), Max: 36.9 (15 Oct 2020 15:30)  T(F): 98 (16 Oct 2020 06:31), Max: 98.5 (15 Oct 2020 15:30)  HR: 71 (16 Oct 2020 06:31) (71 - 80)  BP: 146/62 (16 Oct 2020 06:31) (145/65 - 164/81)  BP(mean): --  RR: 18 (16 Oct 2020 06:31) (18 - 18)  SpO2: 94% (16 Oct 2020 06:31) (94% - 100%)    PHYSICAL EXAM:    General: Well developed, well nourished, no acute distress  HEENT:anicteric sclera; MMM  Neck: supple  Cardiovascular: +S1/S2, RRR, no murmurs, rubs, gallops  Respiratory: CTA B/L; no W/R/R  Gastrointestinal: soft, NT/ND; +BSx4  Extremities: WWP; no edema, clubbing or cyanosis  Vascular: 2+ radial, DP/PT pulses B/L  Neurological: AAOx3; no focal deficits    MEDICATIONS:  MEDICATIONS  (STANDING):  amLODIPine   Tablet 5 milliGRAM(s) Oral daily  apixaban 10 milliGRAM(s) Oral every 12 hours  aspirin enteric coated 81 milliGRAM(s) Oral daily  atorvastatin 10 milliGRAM(s) Oral at bedtime  cefTRIAXone   IVPB 2000 milliGRAM(s) IV Intermittent every 24 hours  dextrose 5%. 1000 milliLiter(s) (50 mL/Hr) IV Continuous <Continuous>  dextrose 50% Injectable 12.5 Gram(s) IV Push once  dextrose 50% Injectable 25 Gram(s) IV Push once  dextrose 50% Injectable 25 Gram(s) IV Push once  ferrous    sulfate 325 milliGRAM(s) Oral daily  influenza  Vaccine (HIGH DOSE) 0.7 milliLiter(s) IntraMuscular once  insulin glargine Injectable (LANTUS) 8 Unit(s) SubCutaneous at bedtime  insulin lispro (HumaLOG) corrective regimen sliding scale   SubCutaneous Before meals and at bedtime  tamsulosin 0.4 milliGRAM(s) Oral at bedtime    MEDICATIONS  (PRN):  dextrose 40% Gel 15 Gram(s) Oral once PRN Blood Glucose LESS THAN 70 milliGRAM(s)/deciliter  glucagon  Injectable 1 milliGRAM(s) IntraMuscular once PRN Glucose LESS THAN 70 milligrams/deciliter      ALLERGIES:  Allergies    No Known Allergies    Intolerances        LABS:                        9.8    6.72  )-----------( 205      ( 16 Oct 2020 06:17 )             32.0     10-16    142  |  109<H>  |  18  ----------------------------<  104<H>  4.3   |  22  |  1.47<H>    Ca    8.8      16 Oct 2020 06:17  Mg     2.0     10-16    TPro  6.4  /  Alb  3.2<L>  /  TBili  0.2  /  DBili  x   /  AST  27  /  ALT  27  /  AlkPhos  105  10-16        CAPILLARY BLOOD GLUCOSE      POCT Blood Glucose.: 120 mg/dL (16 Oct 2020 12:09)      RADIOLOGY & ADDITIONAL TESTS: Reviewed.    PLAN:

## 2020-10-16 NOTE — PROGRESS NOTE ADULT - PROBLEM SELECTOR PLAN 5
Pt has known hx of HTN and takes Amlodipine 10mg + Lisinopril 10mg at home. Patient's last blood pressure read 151/69  -Start Amlodipine 5mg (home dose of 10mg)  -continue to hold Lisinopril. Pt found to have an elevated Cr of 1.47. Baseline is unknown but prior records show Cr as high as 1.6. Denies changes in urination, hematuria, dysuria.  - Trend daily BMP.

## 2020-10-16 NOTE — PROGRESS NOTE ADULT - SUBJECTIVE AND OBJECTIVE BOX
OVERNIGHT EVENTS:    SUBJECTIVE / INTERVAL HPI: Patient seen and examined at bedside.     VITAL SIGNS:  Vital Signs Last 24 Hrs  T(C): 36.7 (16 Oct 2020 06:31), Max: 36.9 (15 Oct 2020 15:30)  T(F): 98 (16 Oct 2020 06:31), Max: 98.5 (15 Oct 2020 15:30)  HR: 71 (16 Oct 2020 06:31) (71 - 80)  BP: 146/62 (16 Oct 2020 06:31) (145/65 - 164/81)  BP(mean): --  RR: 18 (16 Oct 2020 06:31) (18 - 18)  SpO2: 94% (16 Oct 2020 06:31) (94% - 100%)    PHYSICAL EXAM:    General: Well developed, well nourished, no acute distress  HEENT: NC/AT; PERRL, anicteric sclera; MMM  Neck: supple, no JVD  Cardiovascular: +S1/S2, RRR; no murmurs, rubs, gallops  Respiratory: CTAB; no wheezes, rales, or rhonchi  Gastrointestinal: soft, NT/ND, no massess palpated; no rebound tenderness or guarding; +BS  Extremities: warm and well-perfused; no edema, clubbing or cyanosis  Vascular: 2+ radial, DP pulses B/L  Neurological: AAOx3; no focal deficits; SAFIA    MEDICATIONS:  MEDICATIONS  (STANDING):  amLODIPine   Tablet 5 milliGRAM(s) Oral daily  apixaban 10 milliGRAM(s) Oral every 12 hours  aspirin enteric coated 81 milliGRAM(s) Oral daily  atorvastatin 10 milliGRAM(s) Oral at bedtime  cefTRIAXone   IVPB 2000 milliGRAM(s) IV Intermittent every 24 hours  dextrose 5%. 1000 milliLiter(s) (50 mL/Hr) IV Continuous <Continuous>  dextrose 50% Injectable 12.5 Gram(s) IV Push once  dextrose 50% Injectable 25 Gram(s) IV Push once  dextrose 50% Injectable 25 Gram(s) IV Push once  ferrous    sulfate 325 milliGRAM(s) Oral daily  influenza  Vaccine (HIGH DOSE) 0.7 milliLiter(s) IntraMuscular once  insulin glargine Injectable (LANTUS) 8 Unit(s) SubCutaneous at bedtime  insulin lispro (HumaLOG) corrective regimen sliding scale   SubCutaneous Before meals and at bedtime  tamsulosin 0.4 milliGRAM(s) Oral at bedtime    MEDICATIONS  (PRN):  dextrose 40% Gel 15 Gram(s) Oral once PRN Blood Glucose LESS THAN 70 milliGRAM(s)/deciliter  glucagon  Injectable 1 milliGRAM(s) IntraMuscular once PRN Glucose LESS THAN 70 milligrams/deciliter      ALLERGIES:  Allergies    No Known Allergies    Intolerances        LABS:                        9.8    6.72  )-----------( 205      ( 16 Oct 2020 06:17 )             32.0     10-16    142  |  109<H>  |  18  ----------------------------<  104<H>  4.3   |  22  |  1.47<H>    Ca    8.8      16 Oct 2020 06:17  Mg     2.0     10-16    TPro  6.4  /  Alb  3.2<L>  /  TBili  0.2  /  DBili  x   /  AST  27  /  ALT  27  /  AlkPhos  105  10-16        CAPILLARY BLOOD GLUCOSE      POCT Blood Glucose.: 120 mg/dL (16 Oct 2020 12:09)      RADIOLOGY & ADDITIONAL TESTS: Reviewed.    PLAN:

## 2020-10-16 NOTE — DISCHARGE NOTE NURSING/CASE MANAGEMENT/SOCIAL WORK - NSDCFUADDAPPT_GEN_ALL_CORE_FT
Please follow up with your Internal Medicine Provider, Dr. Naif Carias on behalf of Dr. Devendra Fang at [address] [place] [address], [address] Floor on 11/9/2020 at 12:00pm. Please note that the office will call to schedule an earlier appointment is available.    Appointment was scheduled by Ms. KAREN Hayes, Referral Coordinator.    Please call [facility] Infectious Diseases office at [phone] to schedule and confirm your appointment with Dr. Thompson.

## 2020-10-16 NOTE — PROGRESS NOTE ADULT - PROBLEM SELECTOR PLAN 9
Problem: Nutrition, metabolism, and development symptoms. Plan; F: none  E: replete PRN  N: DASH/TLC Pt found to have incidental renal cyst on CT chest.  - Refer for outpatient follow-up.

## 2020-10-16 NOTE — PROGRESS NOTE ADULT - PROBLEM SELECTOR PLAN 4
Pt has known hx of HTN and takes Amlodipine 10mg + Lisinopril 10mg at home. Patient's last blood pressure read 151/69  -Start Amlodipine 5mg (home dose of 10mg)  -continue to hold Lisinopril.

## 2020-10-16 NOTE — PROGRESS NOTE ADULT - PROBLEM SELECTOR PLAN 6
Pt has known hx of DM2 and takes Glipizide 10mg with Sitagliptin 100mg at home, does not take insulin.   - HgA1C 9.0% (average glucose 212) on admission  - mealtime insulin

## 2020-10-16 NOTE — PROGRESS NOTE ADULT - PROBLEM SELECTOR PLAN 7
Pt found to have incidental thyroid nodule on CT chest.  - Refer for outpatient follow-up. Pt has known hx HLD and takes pravastatin 20mg at home  - c/w Atorvastatin 10mg qhs via therapeutic interchange.

## 2020-10-16 NOTE — DISCHARGE NOTE PROVIDER - PROVIDER TOKENS
PROVIDER:[TOKEN:[[id number]],FOLLOWUP:[1 week]] PROVIDER:[TOKEN:[[id number]],FOLLOWUP:[1 week]],PROVIDER:[TOKEN:[[id number]],FOLLOWUP:[1 week]] PROVIDER:[TOKEN:[[id number]],SCHEDULEDAPPT:[11/09/2020],SCHEDULEDAPPTTIME:[12:00 PM]],PROVIDER:[TOKEN:[[id number]],FOLLOWUP:[1 week]]

## 2020-10-16 NOTE — DISCHARGE NOTE PROVIDER - CARE PROVIDERS DIRECT ADDRESSES
[email or web address]

## 2020-10-16 NOTE — PROGRESS NOTE ADULT - REASON FOR ADMISSION
severe sepsis 2/2 unknown source

## 2020-10-16 NOTE — DISCHARGE NOTE PROVIDER - NSDCMRMEDTOKEN_GEN_ALL_CORE_FT
amLODIPine 10 mg oral tablet: 1 tab(s) orally once a day  aspirin 81 mg oral tablet: 1 tab(s) orally once a day  clopidogrel 75 mg oral tablet: 1 tab(s) orally once a day  glipiZIDE 10 mg oral tablet: 1 tab(s) orally once a day  lisinopril 10 mg oral tablet: 1 tab(s) orally once a day  pravastatin 20 mg oral tablet: 1 tab(s) orally once a day  SITagliptin 100 mg oral tablet: 1 tab(s) orally once a day  tamsulosin 0.4 mg oral capsule: 1 cap(s) orally once a day   amLODIPine 10 mg oral tablet: 1 tab(s) orally once a day  aspirin 81 mg oral tablet: 1 tab(s) orally once a day  glipiZIDE 10 mg oral tablet: 1 tab(s) orally once a day  lisinopril 10 mg oral tablet: 1 tab(s) orally once a day  pravastatin 20 mg oral tablet: 1 tab(s) orally once a day  SITagliptin 100 mg oral tablet: 1 tab(s) orally once a day  tamsulosin 0.4 mg oral capsule: 1 cap(s) orally once a day   amLODIPine 10 mg oral tablet: 1 tab(s) orally once a day  aspirin 81 mg oral tablet: 1 tab(s) orally once a day  ciprofloxacin 500 mg oral tablet: 1 tab(s) orally every 12 hours starting on 10/17/2020.  Eliquis 5 mg oral tablet: 1 tab(s) orally every 12 hours starting on 10/24/2020  Eliquis 5 mg oral tablet: 2 tab(s) orally every 12 hours   glipiZIDE 10 mg oral tablet: 1 tab(s) orally once a day  lisinopril 10 mg oral tablet: 1 tab(s) orally once a day  pravastatin 20 mg oral tablet: 1 tab(s) orally once a day  SITagliptin 100 mg oral tablet: 1 tab(s) orally once a day  tamsulosin 0.4 mg oral capsule: 1 cap(s) orally once a day

## 2020-10-16 NOTE — DISCHARGE NOTE PROVIDER - NSDCFUADDAPPT_GEN_ALL_CORE_FT
Please call [facility] at [phone] to confirm your medicine appointment.    Please call [facility] Infectious Diseases office at [phone] to schedule and confirm your appointment with Dr. Thompson. Please follow up with your Internal Medicine Provider, Dr. Naif Carias on behalf of Dr. Devendra Fang at [address] [place] [address], [address]nd Floor on 11/9/2020 at 12:00pm. Please note that the office will call to schedule an earlier appointment is available.    Appointment was scheduled by Ms. KAREN Hayes, Referral Coordinator.    Please call [facility] Infectious Diseases office at [phone] to schedule and confirm your appointment with Dr. Thompson.

## 2020-10-17 RX ORDER — CIPROFLOXACIN LACTATE 400MG/40ML
1 VIAL (ML) INTRAVENOUS
Qty: 20 | Refills: 0
Start: 2020-10-17 | End: 2020-10-26

## 2020-10-17 RX ORDER — APIXABAN 2.5 MG/1
2 TABLET, FILM COATED ORAL
Qty: 28 | Refills: 0
Start: 2020-10-17 | End: 2020-10-23

## 2020-10-17 RX ORDER — APIXABAN 2.5 MG/1
2 TABLET, FILM COATED ORAL
Qty: 24 | Refills: 0
Start: 2020-10-17 | End: 2020-10-22

## 2020-10-18 LAB
CULTURE RESULTS: SIGNIFICANT CHANGE UP
CULTURE RESULTS: SIGNIFICANT CHANGE UP
ORGANISM # SPEC MICROSCOPIC CNT: SIGNIFICANT CHANGE UP
SPECIMEN SOURCE: SIGNIFICANT CHANGE UP
SPECIMEN SOURCE: SIGNIFICANT CHANGE UP

## 2020-10-19 LAB
CULTURE RESULTS: SIGNIFICANT CHANGE UP
SPECIMEN SOURCE: SIGNIFICANT CHANGE UP

## 2020-10-23 DIAGNOSIS — E11.22 TYPE 2 DIABETES MELLITUS WITH DIABETIC CHRONIC KIDNEY DISEASE: ICD-10-CM

## 2020-10-23 DIAGNOSIS — I82.812 EMBOLISM AND THROMBOSIS OF SUPERFICIAL VEINS OF LEFT LOWER EXTREMITY: ICD-10-CM

## 2020-10-23 DIAGNOSIS — A41.9 SEPSIS, UNSPECIFIED ORGANISM: ICD-10-CM

## 2020-10-23 DIAGNOSIS — I82.431 ACUTE EMBOLISM AND THROMBOSIS OF RIGHT POPLITEAL VEIN: ICD-10-CM

## 2020-10-23 DIAGNOSIS — N28.1 CYST OF KIDNEY, ACQUIRED: ICD-10-CM

## 2020-10-23 DIAGNOSIS — I12.9 HYPERTENSIVE CHRONIC KIDNEY DISEASE WITH STAGE 1 THROUGH STAGE 4 CHRONIC KIDNEY DISEASE, OR UNSPECIFIED CHRONIC KIDNEY DISEASE: ICD-10-CM

## 2020-10-23 DIAGNOSIS — R65.20 SEVERE SEPSIS WITHOUT SEPTIC SHOCK: ICD-10-CM

## 2020-10-23 DIAGNOSIS — E04.1 NONTOXIC SINGLE THYROID NODULE: ICD-10-CM

## 2020-10-23 DIAGNOSIS — I73.9 PERIPHERAL VASCULAR DISEASE, UNSPECIFIED: ICD-10-CM

## 2020-10-23 DIAGNOSIS — Z79.84 LONG TERM (CURRENT) USE OF ORAL HYPOGLYCEMIC DRUGS: ICD-10-CM

## 2020-10-23 DIAGNOSIS — Z79.82 LONG TERM (CURRENT) USE OF ASPIRIN: ICD-10-CM

## 2020-10-23 DIAGNOSIS — N40.0 BENIGN PROSTATIC HYPERPLASIA WITHOUT LOWER URINARY TRACT SYMPTOMS: ICD-10-CM

## 2020-10-23 DIAGNOSIS — E78.5 HYPERLIPIDEMIA, UNSPECIFIED: ICD-10-CM

## 2020-10-23 DIAGNOSIS — N18.30 CHRONIC KIDNEY DISEASE, STAGE 3 UNSPECIFIED: ICD-10-CM

## 2020-10-23 DIAGNOSIS — R60.0 LOCALIZED EDEMA: ICD-10-CM

## 2020-10-23 DIAGNOSIS — Z95.828 PRESENCE OF OTHER VASCULAR IMPLANTS AND GRAFTS: ICD-10-CM

## 2020-10-23 DIAGNOSIS — A41.51 SEPSIS DUE TO ESCHERICHIA COLI [E. COLI]: ICD-10-CM

## 2020-10-23 RX ORDER — APIXABAN 2.5 MG/1
1 TABLET, FILM COATED ORAL
Qty: 60 | Refills: 0
Start: 2020-10-23 | End: 2020-11-21

## 2020-10-24 RX ORDER — APIXABAN 2.5 MG/1
1 TABLET, FILM COATED ORAL
Qty: 60 | Refills: 2
Start: 2020-10-24 | End: 2021-01-21

## 2020-11-02 ENCOUNTER — APPOINTMENT (OUTPATIENT)
Dept: INFECTIOUS DISEASE | Facility: CLINIC | Age: 85
End: 2020-11-02
Payer: MEDICARE

## 2020-11-02 VITALS
SYSTOLIC BLOOD PRESSURE: 143 MMHG | WEIGHT: 192.38 LBS | HEART RATE: 79 BPM | BODY MASS INDEX: 30.92 KG/M2 | OXYGEN SATURATION: 99 % | HEIGHT: 66 IN | DIASTOLIC BLOOD PRESSURE: 63 MMHG | TEMPERATURE: 97.8 F

## 2020-11-02 DIAGNOSIS — Z23 ENCOUNTER FOR IMMUNIZATION: ICD-10-CM

## 2020-11-02 PROCEDURE — 90662 IIV NO PRSV INCREASED AG IM: CPT

## 2020-11-02 PROCEDURE — 99072 ADDL SUPL MATRL&STAF TM PHE: CPT

## 2020-11-02 PROCEDURE — 99204 OFFICE O/P NEW MOD 45 MIN: CPT | Mod: 25

## 2020-11-02 PROCEDURE — G0008: CPT

## 2020-11-02 NOTE — PHYSICAL EXAM
[General Appearance - Alert] : alert [General Appearance - In No Acute Distress] : in no acute distress [Sclera] : the sclera and conjunctiva were normal [Outer Ear] : the ears and nose were normal in appearance [Neck Appearance] : the appearance of the neck was normal [Heart Rate And Rhythm] : heart rate was normal and rhythm regular [Edema] : there was no peripheral edema [Abdomen Soft] : soft [No Palpable Adenopathy] : no palpable adenopathy [] : no rash [Motor Exam] : the motor exam was normal [Oriented To Time, Place, And Person] : oriented to person, place, and time

## 2020-11-02 NOTE — PROGRESS NOTE ADULT - PROBLEM/PLAN-2
DISPLAY PLAN FREE TEXT
100

## 2020-11-02 NOTE — HISTORY OF PRESENT ILLNESS
[FreeTextEntry1] : 89 year old male with HTN, DM2, HLD, PAD s/p angio/sfa stent (8/2015) on ASA/prlavix, chronic LE edema and BPH.  Patient was hospitalized 10/13 - 10/16 for rigors, generalized weakness found to have sepsis 2/2 E.coli bacteremia.  No clear source of bacteremia - UA, CT chest, Gallium scan all negative.  Initially trested with pip-tazo then ceftriaxone.  He was discharged on 10/16 to continue ciprofloxacin 500 mg PO q 12 h x 10 days for a total of 14 days of antibiotics.  He is still taking ciprofloxacin as he missed a couple doses.  He denies fever, chills.

## 2020-11-03 LAB
ALBUMIN SERPL ELPH-MCNC: 4.2 G/DL
ALP BLD-CCNC: 228 U/L
ALT SERPL-CCNC: 52 U/L
ANION GAP SERPL CALC-SCNC: 11 MMOL/L
AST SERPL-CCNC: 28 U/L
BASOPHILS # BLD AUTO: 0.06 K/UL
BASOPHILS NFR BLD AUTO: 0.9 %
BILIRUB SERPL-MCNC: 0.4 MG/DL
BUN SERPL-MCNC: 37 MG/DL
CALCIUM SERPL-MCNC: 9.3 MG/DL
CHLORIDE SERPL-SCNC: 107 MMOL/L
CO2 SERPL-SCNC: 21 MMOL/L
CREAT SERPL-MCNC: 1.69 MG/DL
EOSINOPHIL # BLD AUTO: 0.08 K/UL
EOSINOPHIL NFR BLD AUTO: 1.2 %
GLUCOSE SERPL-MCNC: 171 MG/DL
HCT VFR BLD CALC: 37.2 %
HGB BLD-MCNC: 11 G/DL
IMM GRANULOCYTES NFR BLD AUTO: 0.3 %
LYMPHOCYTES # BLD AUTO: 1.71 K/UL
LYMPHOCYTES NFR BLD AUTO: 25.1 %
MAN DIFF?: NORMAL
MCHC RBC-ENTMCNC: 28.2 PG
MCHC RBC-ENTMCNC: 29.6 GM/DL
MCV RBC AUTO: 95.4 FL
MONOCYTES # BLD AUTO: 0.57 K/UL
MONOCYTES NFR BLD AUTO: 8.4 %
NEUTROPHILS # BLD AUTO: 4.38 K/UL
NEUTROPHILS NFR BLD AUTO: 64.1 %
PLATELET # BLD AUTO: 306 K/UL
POTASSIUM SERPL-SCNC: 5.6 MMOL/L
PROT SERPL-MCNC: 7.1 G/DL
RBC # BLD: 3.9 M/UL
RBC # FLD: 14.9 %
SODIUM SERPL-SCNC: 139 MMOL/L
WBC # FLD AUTO: 6.82 K/UL

## 2020-11-09 ENCOUNTER — APPOINTMENT (OUTPATIENT)
Dept: INTERNAL MEDICINE | Facility: CLINIC | Age: 85
End: 2020-11-09
Payer: MEDICARE

## 2020-11-09 ENCOUNTER — NON-APPOINTMENT (OUTPATIENT)
Age: 85
End: 2020-11-09

## 2020-11-09 VITALS
SYSTOLIC BLOOD PRESSURE: 123 MMHG | DIASTOLIC BLOOD PRESSURE: 61 MMHG | HEART RATE: 57 BPM | WEIGHT: 190 LBS | OXYGEN SATURATION: 98 % | HEIGHT: 66 IN | TEMPERATURE: 97.9 F | BODY MASS INDEX: 30.53 KG/M2

## 2020-11-09 DIAGNOSIS — E11.9 TYPE 2 DIABETES MELLITUS W/OUT COMPLICATIONS: ICD-10-CM

## 2020-11-09 DIAGNOSIS — Z87.438 PERSONAL HISTORY OF OTHER DISEASES OF MALE GENITAL ORGANS: ICD-10-CM

## 2020-11-09 DIAGNOSIS — Z86.79 PERSONAL HISTORY OF OTHER DISEASES OF THE CIRCULATORY SYSTEM: ICD-10-CM

## 2020-11-09 LAB — BACTERIA BLD CULT: NORMAL

## 2020-11-09 PROCEDURE — 99204 OFFICE O/P NEW MOD 45 MIN: CPT | Mod: 25,GC

## 2020-11-09 PROCEDURE — 99072 ADDL SUPL MATRL&STAF TM PHE: CPT

## 2020-11-09 PROCEDURE — 36415 COLL VENOUS BLD VENIPUNCTURE: CPT

## 2020-11-09 RX ORDER — APIXABAN 5 MG/1
5 TABLET, FILM COATED ORAL
Qty: 180 | Refills: 0 | Status: ACTIVE | COMMUNITY
Start: 2020-11-09 | End: 1900-01-01

## 2020-11-09 RX ORDER — OMEPRAZOLE MAGNESIUM 20 MG/1
20 TABLET, DELAYED RELEASE ORAL TWICE DAILY
Qty: 60 | Refills: 2 | Status: ACTIVE | COMMUNITY
Start: 2020-11-09 | End: 1900-01-01

## 2020-11-10 LAB
ALBUMIN SERPL ELPH-MCNC: 3.9 G/DL
ALP BLD-CCNC: 172 U/L
ALT SERPL-CCNC: 27 U/L
ANION GAP SERPL CALC-SCNC: 13 MMOL/L
AST SERPL-CCNC: 28 U/L
BASOPHILS # BLD AUTO: 0.04 K/UL
BASOPHILS NFR BLD AUTO: 0.8 %
BILIRUB SERPL-MCNC: 0.3 MG/DL
BUN SERPL-MCNC: 35 MG/DL
CALCIUM SERPL-MCNC: 9 MG/DL
CHLORIDE SERPL-SCNC: 106 MMOL/L
CHOLEST SERPL-MCNC: 156 MG/DL
CO2 SERPL-SCNC: 21 MMOL/L
CREAT SERPL-MCNC: 1.55 MG/DL
EOSINOPHIL # BLD AUTO: 0.11 K/UL
EOSINOPHIL NFR BLD AUTO: 2.1 %
ESTIMATED AVERAGE GLUCOSE: 203 MG/DL
FERRITIN SERPL-MCNC: 18 NG/ML
GLUCOSE SERPL-MCNC: 193 MG/DL
HBA1C MFR BLD HPLC: 8.7 %
HCT VFR BLD CALC: 35.4 %
HDLC SERPL-MCNC: 62 MG/DL
HGB BLD-MCNC: 10.3 G/DL
IMM GRANULOCYTES NFR BLD AUTO: 0.4 %
IRON SATN MFR SERPL: 10 %
IRON SERPL-MCNC: 39 UG/DL
LDLC SERPL CALC-MCNC: 78 MG/DL
LYMPHOCYTES # BLD AUTO: 1.75 K/UL
LYMPHOCYTES NFR BLD AUTO: 33.3 %
MAN DIFF?: NORMAL
MCHC RBC-ENTMCNC: 28.1 PG
MCHC RBC-ENTMCNC: 29.1 GM/DL
MCV RBC AUTO: 96.5 FL
MONOCYTES # BLD AUTO: 0.45 K/UL
MONOCYTES NFR BLD AUTO: 8.6 %
NEUTROPHILS # BLD AUTO: 2.89 K/UL
NEUTROPHILS NFR BLD AUTO: 54.8 %
NONHDLC SERPL-MCNC: 94 MG/DL
PLATELET # BLD AUTO: 229 K/UL
POTASSIUM SERPL-SCNC: 5.5 MMOL/L
PROT SERPL-MCNC: 6.4 G/DL
RBC # BLD: 3.67 M/UL
RBC # FLD: 14.7 %
SODIUM SERPL-SCNC: 141 MMOL/L
TIBC SERPL-MCNC: 391 UG/DL
TRIGL SERPL-MCNC: 79 MG/DL
TSH SERPL-ACNC: 0.5 UIU/ML
UIBC SERPL-MCNC: 351 UG/DL
WBC # FLD AUTO: 5.26 K/UL

## 2020-11-11 DIAGNOSIS — E87.5 HYPERKALEMIA: ICD-10-CM

## 2020-11-11 LAB
ALBUMIN MFR SERPL ELPH: 52.5 %
ALBUMIN SERPL-MCNC: 3.4 G/DL
ALBUMIN/GLOB SERPL: 1.1 RATIO
ALPHA1 GLOB MFR SERPL ELPH: 4.2 %
ALPHA1 GLOB SERPL ELPH-MCNC: 0.3 G/DL
ALPHA2 GLOB MFR SERPL ELPH: 10.4 %
ALPHA2 GLOB SERPL ELPH-MCNC: 0.7 G/DL
B-GLOBULIN MFR SERPL ELPH: 15.4 %
B-GLOBULIN SERPL ELPH-MCNC: 1 G/DL
GAMMA GLOB FLD ELPH-MCNC: 1.1 G/DL
GAMMA GLOB MFR SERPL ELPH: 17.5 %
INTERPRETATION SERPL IEP-IMP: NORMAL
PROT SERPL-MCNC: 6.4 G/DL
VIT B12 SERPL-MCNC: 453 PG/ML

## 2020-11-12 LAB
DEPRECATED KAPPA LC FREE/LAMBDA SER: 1.71 RATIO
KAPPA LC CSF-MCNC: 3.46 MG/DL
KAPPA LC SERPL-MCNC: 5.93 MG/DL

## 2020-11-13 ENCOUNTER — NON-APPOINTMENT (OUTPATIENT)
Age: 85
End: 2020-11-13

## 2020-11-23 PROCEDURE — 36415 COLL VENOUS BLD VENIPUNCTURE: CPT

## 2020-11-23 PROCEDURE — 78802 RP LOCLZJ TUM WHBDY 1 D IMG: CPT

## 2020-11-23 PROCEDURE — 82728 ASSAY OF FERRITIN: CPT

## 2020-11-23 PROCEDURE — 97161 PT EVAL LOW COMPLEX 20 MIN: CPT

## 2020-11-23 PROCEDURE — 85027 COMPLETE CBC AUTOMATED: CPT

## 2020-11-23 PROCEDURE — U0003: CPT

## 2020-11-23 PROCEDURE — 85652 RBC SED RATE AUTOMATED: CPT

## 2020-11-23 PROCEDURE — 83036 HEMOGLOBIN GLYCOSYLATED A1C: CPT

## 2020-11-23 PROCEDURE — 83735 ASSAY OF MAGNESIUM: CPT

## 2020-11-23 PROCEDURE — 80053 COMPREHEN METABOLIC PANEL: CPT

## 2020-11-23 PROCEDURE — 78830 RP LOCLZJ TUM SPECT W/CT 1: CPT

## 2020-11-23 PROCEDURE — 81003 URINALYSIS AUTO W/O SCOPE: CPT

## 2020-11-23 PROCEDURE — 82803 BLOOD GASES ANY COMBINATION: CPT

## 2020-11-23 PROCEDURE — 85379 FIBRIN DEGRADATION QUANT: CPT

## 2020-11-23 PROCEDURE — 96365 THER/PROPH/DIAG IV INF INIT: CPT | Mod: XU

## 2020-11-23 PROCEDURE — 82330 ASSAY OF CALCIUM: CPT

## 2020-11-23 PROCEDURE — 86704 HEP B CORE ANTIBODY TOTAL: CPT

## 2020-11-23 PROCEDURE — A9556: CPT

## 2020-11-23 PROCEDURE — 96361 HYDRATE IV INFUSION ADD-ON: CPT

## 2020-11-23 PROCEDURE — 87340 HEPATITIS B SURFACE AG IA: CPT

## 2020-11-23 PROCEDURE — 86705 HEP B CORE ANTIBODY IGM: CPT

## 2020-11-23 PROCEDURE — 74177 CT ABD & PELVIS W/CONTRAST: CPT

## 2020-11-23 PROCEDURE — 84132 ASSAY OF SERUM POTASSIUM: CPT

## 2020-11-23 PROCEDURE — 87389 HIV-1 AG W/HIV-1&-2 AB AG IA: CPT

## 2020-11-23 PROCEDURE — 87086 URINE CULTURE/COLONY COUNT: CPT

## 2020-11-23 PROCEDURE — 86140 C-REACTIVE PROTEIN: CPT

## 2020-11-23 PROCEDURE — 86803 HEPATITIS C AB TEST: CPT

## 2020-11-23 PROCEDURE — 85025 COMPLETE CBC W/AUTO DIFF WBC: CPT

## 2020-11-23 PROCEDURE — 82977 ASSAY OF GGT: CPT

## 2020-11-23 PROCEDURE — 86709 HEPATITIS A IGM ANTIBODY: CPT

## 2020-11-23 PROCEDURE — 86706 HEP B SURFACE ANTIBODY: CPT

## 2020-11-23 PROCEDURE — 83550 IRON BINDING TEST: CPT

## 2020-11-23 PROCEDURE — 85730 THROMBOPLASTIN TIME PARTIAL: CPT

## 2020-11-23 PROCEDURE — 99285 EMERGENCY DEPT VISIT HI MDM: CPT | Mod: 25

## 2020-11-23 PROCEDURE — 83605 ASSAY OF LACTIC ACID: CPT

## 2020-11-23 PROCEDURE — 87640 STAPH A DNA AMP PROBE: CPT

## 2020-11-23 PROCEDURE — 87449 NOS EACH ORGANISM AG IA: CPT

## 2020-11-23 PROCEDURE — 93306 TTE W/DOPPLER COMPLETE: CPT

## 2020-11-23 PROCEDURE — 87641 MR-STAPH DNA AMP PROBE: CPT

## 2020-11-23 PROCEDURE — 87635 SARS-COV-2 COVID-19 AMP PRB: CPT

## 2020-11-23 PROCEDURE — 80048 BASIC METABOLIC PNL TOTAL CA: CPT

## 2020-11-23 PROCEDURE — 71045 X-RAY EXAM CHEST 1 VIEW: CPT

## 2020-11-23 PROCEDURE — 93005 ELECTROCARDIOGRAM TRACING: CPT

## 2020-11-23 PROCEDURE — 85610 PROTHROMBIN TIME: CPT

## 2020-11-23 PROCEDURE — 87040 BLOOD CULTURE FOR BACTERIA: CPT

## 2020-11-23 PROCEDURE — 87150 DNA/RNA AMPLIFIED PROBE: CPT

## 2020-11-23 PROCEDURE — 71275 CT ANGIOGRAPHY CHEST: CPT

## 2020-11-23 PROCEDURE — 84295 ASSAY OF SERUM SODIUM: CPT

## 2020-11-23 PROCEDURE — 87186 SC STD MICRODIL/AGAR DIL: CPT

## 2020-11-23 PROCEDURE — 84484 ASSAY OF TROPONIN QUANT: CPT

## 2020-11-23 PROCEDURE — 80307 DRUG TEST PRSMV CHEM ANLYZR: CPT

## 2020-11-23 PROCEDURE — 84145 PROCALCITONIN (PCT): CPT

## 2020-11-23 PROCEDURE — 83690 ASSAY OF LIPASE: CPT

## 2020-11-23 PROCEDURE — 84100 ASSAY OF PHOSPHORUS: CPT

## 2020-11-23 PROCEDURE — 0225U NFCT DS DNA&RNA 21 SARSCOV2: CPT

## 2020-11-23 PROCEDURE — 83540 ASSAY OF IRON: CPT

## 2020-11-23 PROCEDURE — 93970 EXTREMITY STUDY: CPT

## 2020-11-23 PROCEDURE — 82962 GLUCOSE BLOOD TEST: CPT

## 2020-11-30 ENCOUNTER — APPOINTMENT (OUTPATIENT)
Dept: INFECTIOUS DISEASE | Facility: CLINIC | Age: 85
End: 2020-11-30
Payer: MEDICARE

## 2020-11-30 VITALS
BODY MASS INDEX: 31.42 KG/M2 | SYSTOLIC BLOOD PRESSURE: 152 MMHG | HEIGHT: 66 IN | DIASTOLIC BLOOD PRESSURE: 60 MMHG | WEIGHT: 195.5 LBS | TEMPERATURE: 97.9 F | OXYGEN SATURATION: 99 % | HEART RATE: 85 BPM

## 2020-11-30 DIAGNOSIS — R78.81 BACTEREMIA: ICD-10-CM

## 2020-11-30 DIAGNOSIS — B96.20 BACTEREMIA: ICD-10-CM

## 2020-11-30 PROCEDURE — 99214 OFFICE O/P EST MOD 30 MIN: CPT

## 2020-11-30 NOTE — HISTORY OF PRESENT ILLNESS
[FreeTextEntry1] : 89 year old male with HTN, DM2, HLD, PAD s/p angio/sfa stent (8/2015) on ASA/prlavix, chronic LE edema and BPH.  Patient was hospitalized 10/13 - 10/16 for rigors, generalized weakness found to have sepsis 2/2 E.coli bacteremia.  No clear source of bacteremia - UA, CT chest, Gallium scan all negative.  Initially treated with pip-tazo then ceftriaxone.  He was discharged on 10/16 to continue ciprofloxacin 500 mg PO q 12 h x 10 days for a total of 14 days of antibiotics.  He has been off antibiotics for ~4 weeks. No fever/chills.

## 2020-11-30 NOTE — PHYSICAL EXAM
[General Appearance - Alert] : alert [General Appearance - In No Acute Distress] : in no acute distress [Sclera] : the sclera and conjunctiva were normal [Outer Ear] : the ears and nose were normal in appearance [Neck Appearance] : the appearance of the neck was normal [] : no respiratory distress [Heart Rate And Rhythm] : heart rate was normal and rhythm regular [Edema] : there was no peripheral edema [Bowel Sounds] : normal bowel sounds [No Palpable Adenopathy] : no palpable adenopathy [Musculoskeletal - Swelling] : no joint swelling [Motor Exam] : the motor exam was normal

## 2020-12-01 LAB
ALBUMIN SERPL ELPH-MCNC: 3.6 G/DL
ALP BLD-CCNC: 126 U/L
ALT SERPL-CCNC: 20 U/L
ANION GAP SERPL CALC-SCNC: 11 MMOL/L
AST SERPL-CCNC: 25 U/L
BASOPHILS # BLD AUTO: 0.04 K/UL
BASOPHILS NFR BLD AUTO: 0.8 %
BILIRUB SERPL-MCNC: 0.2 MG/DL
BUN SERPL-MCNC: 31 MG/DL
CALCIUM SERPL-MCNC: 8.8 MG/DL
CHLORIDE SERPL-SCNC: 106 MMOL/L
CO2 SERPL-SCNC: 23 MMOL/L
CREAT SERPL-MCNC: 1.53 MG/DL
EOSINOPHIL # BLD AUTO: 0.08 K/UL
EOSINOPHIL NFR BLD AUTO: 1.5 %
GLUCOSE SERPL-MCNC: 224 MG/DL
HCT VFR BLD CALC: 28 %
HGB BLD-MCNC: 8.2 G/DL
IMM GRANULOCYTES NFR BLD AUTO: 0.4 %
LYMPHOCYTES # BLD AUTO: 1.58 K/UL
LYMPHOCYTES NFR BLD AUTO: 30.6 %
MAN DIFF?: NORMAL
MCHC RBC-ENTMCNC: 26.8 PG
MCHC RBC-ENTMCNC: 29.3 GM/DL
MCV RBC AUTO: 91.5 FL
MONOCYTES # BLD AUTO: 0.37 K/UL
MONOCYTES NFR BLD AUTO: 7.2 %
NEUTROPHILS # BLD AUTO: 3.08 K/UL
NEUTROPHILS NFR BLD AUTO: 59.5 %
PLATELET # BLD AUTO: 227 K/UL
POTASSIUM SERPL-SCNC: 4.9 MMOL/L
PROT SERPL-MCNC: 5.9 G/DL
RBC # BLD: 3.06 M/UL
RBC # FLD: 14.5 %
SODIUM SERPL-SCNC: 140 MMOL/L
WBC # FLD AUTO: 5.17 K/UL

## 2020-12-08 ENCOUNTER — NON-APPOINTMENT (OUTPATIENT)
Age: 85
End: 2020-12-08

## 2020-12-08 ENCOUNTER — APPOINTMENT (OUTPATIENT)
Dept: INTERNAL MEDICINE | Facility: CLINIC | Age: 85
End: 2020-12-08
Payer: MEDICARE

## 2020-12-08 ENCOUNTER — EMERGENCY (EMERGENCY)
Facility: HOSPITAL | Age: 85
LOS: 1 days | Discharge: HOME | End: 2020-12-08
Attending: EMERGENCY MEDICINE | Admitting: EMERGENCY MEDICINE
Payer: MEDICARE

## 2020-12-08 VITALS
OXYGEN SATURATION: 99 % | TEMPERATURE: 98 F | HEART RATE: 97 BPM | DIASTOLIC BLOOD PRESSURE: 69 MMHG | SYSTOLIC BLOOD PRESSURE: 179 MMHG | RESPIRATION RATE: 18 BRPM

## 2020-12-08 VITALS
SYSTOLIC BLOOD PRESSURE: 154 MMHG | HEART RATE: 79 BPM | TEMPERATURE: 98.4 F | OXYGEN SATURATION: 98 % | WEIGHT: 195 LBS | DIASTOLIC BLOOD PRESSURE: 53 MMHG | BODY MASS INDEX: 31.47 KG/M2

## 2020-12-08 VITALS
WEIGHT: 199.96 LBS | SYSTOLIC BLOOD PRESSURE: 114 MMHG | DIASTOLIC BLOOD PRESSURE: 90 MMHG | RESPIRATION RATE: 18 BRPM | OXYGEN SATURATION: 97 % | TEMPERATURE: 100 F | HEIGHT: 67 IN | HEART RATE: 103 BPM

## 2020-12-08 DIAGNOSIS — R79.89 OTHER SPECIFIED ABNORMAL FINDINGS OF BLOOD CHEMISTRY: ICD-10-CM

## 2020-12-08 DIAGNOSIS — R06.02 SHORTNESS OF BREATH: ICD-10-CM

## 2020-12-08 DIAGNOSIS — Z95.828 PRESENCE OF OTHER VASCULAR IMPLANTS AND GRAFTS: Chronic | ICD-10-CM

## 2020-12-08 DIAGNOSIS — Z79.899 OTHER LONG TERM (CURRENT) DRUG THERAPY: ICD-10-CM

## 2020-12-08 DIAGNOSIS — I10 ESSENTIAL (PRIMARY) HYPERTENSION: ICD-10-CM

## 2020-12-08 DIAGNOSIS — Z79.82 LONG TERM (CURRENT) USE OF ASPIRIN: ICD-10-CM

## 2020-12-08 DIAGNOSIS — H26.9 UNSPECIFIED CATARACT: Chronic | ICD-10-CM

## 2020-12-08 DIAGNOSIS — E11.9 TYPE 2 DIABETES MELLITUS WITHOUT COMPLICATIONS: ICD-10-CM

## 2020-12-08 DIAGNOSIS — Z79.84 LONG TERM (CURRENT) USE OF ORAL HYPOGLYCEMIC DRUGS: ICD-10-CM

## 2020-12-08 DIAGNOSIS — Z98.890 OTHER SPECIFIED POSTPROCEDURAL STATES: Chronic | ICD-10-CM

## 2020-12-08 DIAGNOSIS — Z79.2 LONG TERM (CURRENT) USE OF ANTIBIOTICS: ICD-10-CM

## 2020-12-08 DIAGNOSIS — E78.5 HYPERLIPIDEMIA, UNSPECIFIED: ICD-10-CM

## 2020-12-08 DIAGNOSIS — R53.83 OTHER FATIGUE: ICD-10-CM

## 2020-12-08 LAB
ALBUMIN SERPL ELPH-MCNC: 3.8 G/DL — SIGNIFICANT CHANGE UP (ref 3.3–5)
ALP SERPL-CCNC: 138 U/L — HIGH (ref 40–120)
ALT FLD-CCNC: 22 U/L — SIGNIFICANT CHANGE UP (ref 10–45)
ANION GAP SERPL CALC-SCNC: 12 MMOL/L — SIGNIFICANT CHANGE UP (ref 5–17)
APPEARANCE UR: CLEAR — SIGNIFICANT CHANGE UP
AST SERPL-CCNC: 28 U/L — SIGNIFICANT CHANGE UP (ref 10–40)
BASE EXCESS BLDV CALC-SCNC: -1.3 MMOL/L — SIGNIFICANT CHANGE UP
BILIRUB SERPL-MCNC: 0.3 MG/DL — SIGNIFICANT CHANGE UP (ref 0.2–1.2)
BILIRUB UR-MCNC: NEGATIVE — SIGNIFICANT CHANGE UP
BUN SERPL-MCNC: 23 MG/DL — SIGNIFICANT CHANGE UP (ref 7–23)
CA-I SERPL-SCNC: 1.14 MMOL/L — SIGNIFICANT CHANGE UP (ref 1.12–1.3)
CALCIUM SERPL-MCNC: 9 MG/DL — SIGNIFICANT CHANGE UP (ref 8.4–10.5)
CHLORIDE SERPL-SCNC: 103 MMOL/L — SIGNIFICANT CHANGE UP (ref 96–108)
CO2 SERPL-SCNC: 22 MMOL/L — SIGNIFICANT CHANGE UP (ref 22–31)
COLOR SPEC: YELLOW — SIGNIFICANT CHANGE UP
CREAT SERPL-MCNC: 1.35 MG/DL — HIGH (ref 0.5–1.3)
DIFF PNL FLD: NEGATIVE — SIGNIFICANT CHANGE UP
GAS PNL BLDV: 136 MMOL/L — LOW (ref 138–146)
GAS PNL BLDV: SIGNIFICANT CHANGE UP
GLUCOSE SERPL-MCNC: 208 MG/DL — HIGH (ref 70–99)
GLUCOSE UR QL: 250
HCO3 BLDV-SCNC: 24 MMOL/L — SIGNIFICANT CHANGE UP (ref 20–27)
HCT VFR BLD CALC: 31 % — LOW (ref 39–50)
HGB BLD-MCNC: 9.1 G/DL — LOW (ref 13–17)
INR BLD: 1.27 — HIGH (ref 0.88–1.16)
KETONES UR-MCNC: NEGATIVE — SIGNIFICANT CHANGE UP
LEUKOCYTE ESTERASE UR-ACNC: NEGATIVE — SIGNIFICANT CHANGE UP
MCHC RBC-ENTMCNC: 25.7 PG — LOW (ref 27–34)
MCHC RBC-ENTMCNC: 29.4 GM/DL — LOW (ref 32–36)
MCV RBC AUTO: 87.6 FL — SIGNIFICANT CHANGE UP (ref 80–100)
NITRITE UR-MCNC: NEGATIVE — SIGNIFICANT CHANGE UP
NRBC # BLD: 0 /100 WBCS — SIGNIFICANT CHANGE UP (ref 0–0)
PCO2 BLDV: 44 MMHG — SIGNIFICANT CHANGE UP (ref 41–51)
PH BLDV: 7.36 — SIGNIFICANT CHANGE UP (ref 7.32–7.43)
PH UR: 6.5 — SIGNIFICANT CHANGE UP (ref 5–8)
PLATELET # BLD AUTO: 234 K/UL — SIGNIFICANT CHANGE UP (ref 150–400)
PO2 BLDV: 33 MMHG — SIGNIFICANT CHANGE UP
POTASSIUM BLDV-SCNC: 4.7 MMOL/L — SIGNIFICANT CHANGE UP (ref 3.5–4.9)
POTASSIUM SERPL-MCNC: 4.9 MMOL/L — SIGNIFICANT CHANGE UP (ref 3.5–5.3)
POTASSIUM SERPL-SCNC: 4.9 MMOL/L — SIGNIFICANT CHANGE UP (ref 3.5–5.3)
PROT SERPL-MCNC: 7.4 G/DL — SIGNIFICANT CHANGE UP (ref 6–8.3)
PROT UR-MCNC: NEGATIVE MG/DL — SIGNIFICANT CHANGE UP
PROTHROM AB SERPL-ACNC: 15.1 SEC — HIGH (ref 10.6–13.6)
RAPID RVP RESULT: SIGNIFICANT CHANGE UP
RBC # BLD: 3.54 M/UL — LOW (ref 4.2–5.8)
RBC # FLD: 14.5 % — SIGNIFICANT CHANGE UP (ref 10.3–14.5)
SAO2 % BLDV: 52 % — SIGNIFICANT CHANGE UP
SARS-COV-2 RNA SPEC QL NAA+PROBE: SIGNIFICANT CHANGE UP
SODIUM SERPL-SCNC: 137 MMOL/L — SIGNIFICANT CHANGE UP (ref 135–145)
SP GR SPEC: 1.01 — SIGNIFICANT CHANGE UP (ref 1–1.03)
UROBILINOGEN FLD QL: 0.2 E.U./DL — SIGNIFICANT CHANGE UP
WBC # BLD: 4.92 K/UL — SIGNIFICANT CHANGE UP (ref 3.8–10.5)
WBC # FLD AUTO: 4.92 K/UL — SIGNIFICANT CHANGE UP (ref 3.8–10.5)

## 2020-12-08 PROCEDURE — 84295 ASSAY OF SERUM SODIUM: CPT

## 2020-12-08 PROCEDURE — 84484 ASSAY OF TROPONIN QUANT: CPT

## 2020-12-08 PROCEDURE — 36415 COLL VENOUS BLD VENIPUNCTURE: CPT

## 2020-12-08 PROCEDURE — 84100 ASSAY OF PHOSPHORUS: CPT

## 2020-12-08 PROCEDURE — 83735 ASSAY OF MAGNESIUM: CPT

## 2020-12-08 PROCEDURE — 83880 ASSAY OF NATRIURETIC PEPTIDE: CPT

## 2020-12-08 PROCEDURE — 82728 ASSAY OF FERRITIN: CPT

## 2020-12-08 PROCEDURE — 71045 X-RAY EXAM CHEST 1 VIEW: CPT

## 2020-12-08 PROCEDURE — 99283 EMERGENCY DEPT VISIT LOW MDM: CPT | Mod: 25

## 2020-12-08 PROCEDURE — 80053 COMPREHEN METABOLIC PANEL: CPT

## 2020-12-08 PROCEDURE — 81003 URINALYSIS AUTO W/O SCOPE: CPT

## 2020-12-08 PROCEDURE — 93005 ELECTROCARDIOGRAM TRACING: CPT

## 2020-12-08 PROCEDURE — 86140 C-REACTIVE PROTEIN: CPT

## 2020-12-08 PROCEDURE — 85027 COMPLETE CBC AUTOMATED: CPT

## 2020-12-08 PROCEDURE — 93010 ELECTROCARDIOGRAM REPORT: CPT

## 2020-12-08 PROCEDURE — 85610 PROTHROMBIN TIME: CPT

## 2020-12-08 PROCEDURE — 0225U NFCT DS DNA&RNA 21 SARSCOV2: CPT

## 2020-12-08 PROCEDURE — 82330 ASSAY OF CALCIUM: CPT

## 2020-12-08 PROCEDURE — 99215 OFFICE O/P EST HI 40 MIN: CPT

## 2020-12-08 PROCEDURE — 99072 ADDL SUPL MATRL&STAF TM PHE: CPT

## 2020-12-08 PROCEDURE — 84132 ASSAY OF SERUM POTASSIUM: CPT

## 2020-12-08 PROCEDURE — 99284 EMERGENCY DEPT VISIT MOD MDM: CPT

## 2020-12-08 PROCEDURE — 82803 BLOOD GASES ANY COMBINATION: CPT

## 2020-12-08 PROCEDURE — 71045 X-RAY EXAM CHEST 1 VIEW: CPT | Mod: 26

## 2020-12-08 NOTE — ED PROVIDER NOTE - CLINICAL SUMMARY MEDICAL DECISION MAKING FREE TEXT BOX
89 y/o M pt presents to ED from clinic for abnormal labs, inclusive of anemia, hyperglycemia, and ketones in urine. Pt asymptomatic in ED, well appearing, HD stable, no signs fo DKA, significant dehydration, or ketosis. Pt's anemia appears to be at chronic baseline at 9.1. Pt has no symptoms whatsoever and is requesting to be discharged. Spoke with Dr. Fang, coordinated discharge with outpatient f/u for routine reevaluation.

## 2020-12-08 NOTE — ED PROVIDER NOTE - OBJECTIVE STATEMENT
89 y/o M pt with PMHx of DM, HLD, and HTN, and no pertinent PSHx presents to ED sent from [address] [place] [address] for evaluation of abnormal lab results. Per clinical report, pt was found to have hemoglobin of 8.3, blood sugar of 311, and ketones in urine. However, pt denies any symptoms of weakness, fevers, chills, chest pain, cough, shortness of breath, dark, tarry stools, or any other acute complaints.      [place] - [id number]

## 2020-12-08 NOTE — ED PROVIDER NOTE - NSFOLLOWUPINSTRUCTIONS_ED_ALL_ED_FT
FOLLOW UP WITH YOUR DOCTOR IN 1-2 WEEKS.  CONTINUE TIGHT CONTROL OF YOUR BLOOD PRESSURE AND SUGAR.  AVOID SALT AND SUGAR.  RETURN FOR ANY SYMPTOMS OR CONCERNS.

## 2020-12-08 NOTE — ED ADULT TRIAGE NOTE - OTHER COMPLAINTS
patient BIBEMS sent by PCP for low hgb (8.3 on 11/30), SOB on exertion,  with ketones, and orthostatic changes. Patient denies CP, offers no complaints

## 2020-12-08 NOTE — ED ADULT NURSE NOTE - OBJECTIVE STATEMENT
Patient presents complaining of being sent in by his PMD for unknown reason.  As per EMS patient has a HGB of 8.3 with associated symptoms of SOB.  Patient is well appearing on exam and denies any symptoms at present time.  Patient ambulates with a cane.

## 2020-12-08 NOTE — ED PROVIDER NOTE - PATIENT PORTAL LINK FT
You can access the FollowMyHealth Patient Portal offered by [facility] by registering at the following website: [email or web address]. By joining [facility]’s FollowMyHealth portal, you will also be able to view your health information using other applications (apps) compatible with our system.

## 2020-12-08 NOTE — ED ADULT NURSE REASSESSMENT NOTE - NS ED NURSE REASSESS COMMENT FT1
Patient A&Ox3, respirations even and unlabored, in no acute distress, ambulatory with cane, patient denies any complaints. Patient disposition for discharge. Patient states will take taxi cab home, states wife is at home waiting for home, lives in first floor, provider aware. Patient observed to be hypertensice, provider aware, patient ok for discharge as patient needs to take at home medications.

## 2020-12-09 ENCOUNTER — NON-APPOINTMENT (OUTPATIENT)
Age: 85
End: 2020-12-09

## 2020-12-10 ENCOUNTER — NON-APPOINTMENT (OUTPATIENT)
Age: 85
End: 2020-12-10

## 2020-12-10 LAB — BACTERIA BLD CULT: NORMAL

## 2020-12-10 RX ORDER — SITAGLIPTIN 100 MG/1
100 TABLET, FILM COATED ORAL DAILY
Qty: 14 | Refills: 0 | Status: ACTIVE | COMMUNITY

## 2020-12-10 RX ORDER — ASPIRIN 81 MG/1
81 TABLET ORAL
Refills: 0 | Status: ACTIVE | COMMUNITY

## 2020-12-10 RX ORDER — TAMSULOSIN HYDROCHLORIDE 0.4 MG/1
0.4 CAPSULE ORAL
Qty: 30 | Refills: 0 | Status: ACTIVE | COMMUNITY

## 2020-12-10 RX ORDER — LISINOPRIL 10 MG/1
10 TABLET ORAL
Qty: 30 | Refills: 0 | Status: ACTIVE | COMMUNITY

## 2020-12-10 RX ORDER — GLIPIZIDE 10 MG/1
10 TABLET, FILM COATED, EXTENDED RELEASE ORAL DAILY
Qty: 30 | Refills: 0 | Status: ACTIVE | COMMUNITY

## 2020-12-11 ENCOUNTER — NON-APPOINTMENT (OUTPATIENT)
Age: 85
End: 2020-12-11

## 2020-12-14 ENCOUNTER — APPOINTMENT (OUTPATIENT)
Dept: VASCULAR SURGERY | Facility: CLINIC | Age: 85
End: 2020-12-14
Payer: MEDICARE

## 2020-12-14 PROCEDURE — 99213 OFFICE O/P EST LOW 20 MIN: CPT

## 2020-12-14 PROCEDURE — 99072 ADDL SUPL MATRL&STAF TM PHE: CPT

## 2020-12-14 PROCEDURE — 93971 EXTREMITY STUDY: CPT

## 2020-12-16 NOTE — HISTORY OF PRESENT ILLNESS
[FreeTextEntry1] : 89 y/o Bahamian speaking M w/hx of claudication s/p RLE angio /SFA stent on 8/13/2015 presents for a follow up evaluation. Pt c/o RLE pain and swelling, he is concern this may be due to a blood clot. He reports being a slow walker and uses a cane for balance but is able to complete his activities with no issues. Denies any skin breakdown, skin discoloration, fever or chills. \par \par He is accompanied by his granddaughter today.

## 2020-12-16 NOTE — ASSESSMENT
[FreeTextEntry1] : 89 y/o M with PAD, s/p Right Angio/ SFA stent on 8/13/15 presents with c/o RLE pain and swelling. 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 \par \par Plan: \par - Discussed with pt findings on venous duplex with no acute DVT. Pt instructed to continue Eliquis daily. \par -  No vascular surgery intervention required for now. \par - Continue activities as tolerated\par - He will continue to f/u with us here 6 months.  [Arterial/Venous Disease] : arterial/venous disease

## 2020-12-16 NOTE — PHYSICAL EXAM
[Normal Breath Sounds] : Normal breath sounds [Respiratory Effort] : normal respiratory effort [Normal Heart Sounds] : normal heart sounds [Normal Rate and Rhythm] : normal rate and rhythm [2+] : left 2+ [0] : right 0 [1+] : left 1+ [Ankle Swelling (On Exam)] : present [Ankle Swelling Bilaterally] : bilaterally  [Alert] : alert [Oriented to Person] : oriented to person [Oriented to Place] : oriented to place [Oriented to Time] : oriented to time [Calm] : calm [JVD] : no jugular venous distention  [Varicose Veins Of Lower Extremities] : not present [] : not present [de-identified] : WN/WD, NAD [de-identified] : NC/AT [de-identified] : Supple  [FreeTextEntry1] : feet warm and pink [de-identified] : FROM 5/5 x 4.

## 2020-12-16 NOTE — ADDENDUM
[FreeTextEntry1] : This note was written by Omaira Garay on 12/14/2020 acting as scribe for Phoenix Joy M.D.\par \par I, Phoenix Alexis have read and attest that all the information, medical decision making and discharge instructions within are true and accurate.

## 2020-12-16 NOTE — REVIEW OF SYSTEMS
[As noted in HPI] : as noted in HPI [Leg Claudication] : intermittent leg claudication [As Noted in HPI] : as noted in HPI [Negative] : Heme/Lymph [Lower Ext Edema] : no extremity edema [Limb Pain] : no limb pain [Limb Swelling] : no limb swelling [Limb Weakness] : no limb weakness [Difficulty Walking] : no difficulty walking

## 2020-12-28 ENCOUNTER — APPOINTMENT (OUTPATIENT)
Dept: INTERNAL MEDICINE | Facility: CLINIC | Age: 85
End: 2020-12-28
Payer: MEDICARE

## 2020-12-28 VITALS
HEIGHT: 66 IN | RESPIRATION RATE: 14 BRPM | DIASTOLIC BLOOD PRESSURE: 80 MMHG | OXYGEN SATURATION: 95 % | TEMPERATURE: 98.7 F | HEART RATE: 81 BPM | WEIGHT: 195 LBS | SYSTOLIC BLOOD PRESSURE: 130 MMHG | BODY MASS INDEX: 31.34 KG/M2

## 2020-12-28 DIAGNOSIS — G89.29 PAIN IN THORACIC SPINE: ICD-10-CM

## 2020-12-28 DIAGNOSIS — M54.5 LOW BACK PAIN: ICD-10-CM

## 2020-12-28 DIAGNOSIS — E11.9 TYPE 2 DIABETES MELLITUS W/OUT COMPLICATIONS: ICD-10-CM

## 2020-12-28 DIAGNOSIS — G89.29 LOW BACK PAIN: ICD-10-CM

## 2020-12-28 DIAGNOSIS — I70.219 ATHEROSCLEROSIS OF NATIVE ARTERIES OF EXTREMITIES WITH INTERMITTENT CLAUDICATION, UNSPECIFIED EXTREMITY: ICD-10-CM

## 2020-12-28 DIAGNOSIS — Z91.81 HISTORY OF FALLING: ICD-10-CM

## 2020-12-28 DIAGNOSIS — Z99.89 DEPENDENCE ON OTHER ENABLING MACHINES AND DEVICES: ICD-10-CM

## 2020-12-28 DIAGNOSIS — I10 ESSENTIAL (PRIMARY) HYPERTENSION: ICD-10-CM

## 2020-12-28 DIAGNOSIS — D64.9 ANEMIA, UNSPECIFIED: ICD-10-CM

## 2020-12-28 DIAGNOSIS — M54.6 PAIN IN THORACIC SPINE: ICD-10-CM

## 2020-12-28 DIAGNOSIS — N18.30 CHRONIC KIDNEY DISEASE, STAGE 3 UNSPECIFIED: ICD-10-CM

## 2020-12-28 DIAGNOSIS — I82.401 ACUTE EMBOLISM AND THROMBOSIS OF UNSPECIFIED DEEP VEINS OF RIGHT LOWER EXTREMITY: ICD-10-CM

## 2020-12-28 PROCEDURE — 99072 ADDL SUPL MATRL&STAF TM PHE: CPT

## 2020-12-28 PROCEDURE — 99214 OFFICE O/P EST MOD 30 MIN: CPT | Mod: 25,GC

## 2020-12-28 PROCEDURE — 36415 COLL VENOUS BLD VENIPUNCTURE: CPT

## 2020-12-28 RX ORDER — LIDOCAINE 5% 700 MG/1
5 PATCH TOPICAL
Qty: 1 | Refills: 5 | Status: ACTIVE | COMMUNITY
Start: 2020-12-28 | End: 1900-01-01

## 2020-12-28 RX ORDER — SODIUM ZIRCONIUM CYCLOSILICATE 10 G/10G
10 POWDER, FOR SUSPENSION ORAL 3 TIMES DAILY
Qty: 1 | Refills: 0 | Status: DISCONTINUED | COMMUNITY
Start: 2020-11-11 | End: 2020-12-28

## 2020-12-28 NOTE — END OF VISIT
[] : Resident [FreeTextEntry3] : reviewed medications and last hgbA1c-- continue current regimine\par \par pt appears younger than stated aged, daughter present\par uses cane to walk, shuffles at times otherwise normal gait in alvarez\par no spinal tenderness\par protuberant abd soft\par \par pt notes back pain chronic. reviewed tightening core and advised of PT benefit

## 2020-12-28 NOTE — HISTORY OF PRESENT ILLNESS
[de-identified] : 89 y/o Malagasy speaking M w/hx of claudication s/p RLE angio /SFA stent on 8/13/2015, RLE DVT on Eliquis, anemia, HTN, DM2 presents for f/u. Pt was sent to [facility] ED on 12/8 for acute Hb 8.4<10.3, +orthostatics where repeat Hb was 9 and he was sent home from ED without intervention. Today c/o chronic lower back pain (710) with radiation to legs described as sharp. Pain occurs in afternoons, sometimes unprovoked,  no clear relationship to activity level (baseline 1 block, limited by SoB and leg pain). Walks with cane, denies falls. Meds reviewed: ASA, Eliquis, Lisinopril, januvia, glimpezide. Wife takes daily BPs (SBP 110s) and FSG (130s, never below 90). Denies sx of orthostatic hypotension or hypoglycemia. Daughter reports good po intake. Good sleep, stable mood. Lives with wife

## 2020-12-28 NOTE — PHYSICAL EXAM
[No Acute Distress] : no acute distress [Well Nourished] : well nourished [Well Developed] : well developed [Well-Appearing] : well-appearing [Normal Sclera/Conjunctiva] : normal sclera/conjunctiva [PERRL] : pupils equal round and reactive to light [EOMI] : extraocular movements intact [Normal Outer Ear/Nose] : the outer ears and nose were normal in appearance [Normal Oropharynx] : the oropharynx was normal [No JVD] : no jugular venous distention [No Lymphadenopathy] : no lymphadenopathy [Supple] : supple [Thyroid Normal, No Nodules] : the thyroid was normal and there were no nodules present [No Respiratory Distress] : no respiratory distress  [No Accessory Muscle Use] : no accessory muscle use [Clear to Auscultation] : lungs were clear to auscultation bilaterally [Normal Rate] : normal rate  [Regular Rhythm] : with a regular rhythm [Normal S1, S2] : normal S1 and S2 [No Murmur] : no murmur heard [No Carotid Bruits] : no carotid bruits [No Abdominal Bruit] : a ~M bruit was not heard ~T in the abdomen [No Varicosities] : no varicosities [Pedal Pulses Present] : the pedal pulses are present [No Edema] : there was no peripheral edema [No Palpable Aorta] : no palpable aorta [No Extremity Clubbing/Cyanosis] : no extremity clubbing/cyanosis [Soft] : abdomen soft [Non Tender] : non-tender [Non-distended] : non-distended [No Masses] : no abdominal mass palpated [No HSM] : no HSM [Normal Bowel Sounds] : normal bowel sounds [Normal Posterior Cervical Nodes] : no posterior cervical lymphadenopathy [Normal Anterior Cervical Nodes] : no anterior cervical lymphadenopathy [No CVA Tenderness] : no CVA  tenderness [No Spinal Tenderness] : no spinal tenderness [No Joint Swelling] : no joint swelling [Grossly Normal Strength/Tone] : grossly normal strength/tone [No Rash] : no rash [Coordination Grossly Intact] : coordination grossly intact [No Focal Deficits] : no focal deficits [Normal Gait] : normal gait [Normal Affect] : the affect was normal [Normal Insight/Judgement] : insight and judgment were intact [de-identified] : 1+ bilatral DP  [de-identified] : Trace/1+ non pitting LE edema symmetric, venous stasis skin changes over RLE  [de-identified] : gait instabilty on cane

## 2020-12-31 DIAGNOSIS — D50.9 IRON DEFICIENCY ANEMIA, UNSPECIFIED: ICD-10-CM

## 2020-12-31 DIAGNOSIS — K59.09 OTHER CONSTIPATION: ICD-10-CM

## 2021-01-04 LAB
ALBUMIN SERPL ELPH-MCNC: 3.7 G/DL
ALP BLD-CCNC: 112 U/L
ALT SERPL-CCNC: 13 U/L
ANION GAP SERPL CALC-SCNC: 12 MMOL/L
AST SERPL-CCNC: 12 U/L
BASOPHILS # BLD AUTO: 0.03 K/UL
BASOPHILS NFR BLD AUTO: 0.5 %
BILIRUB SERPL-MCNC: 0.2 MG/DL
BUN SERPL-MCNC: 35 MG/DL
CALCIUM SERPL-MCNC: 8.8 MG/DL
CHLORIDE SERPL-SCNC: 108 MMOL/L
CO2 SERPL-SCNC: 20 MMOL/L
CREAT SERPL-MCNC: 1.77 MG/DL
CREAT SPEC-SCNC: 120 MG/DL
EOSINOPHIL # BLD AUTO: 0.07 K/UL
EOSINOPHIL NFR BLD AUTO: 1.1 %
FERRITIN SERPL-MCNC: 9 NG/ML
GLUCOSE SERPL-MCNC: 224 MG/DL
HAPTOGLOB SERPL-MCNC: 104 MG/DL
HCT VFR BLD CALC: 31 %
HGB BLD-MCNC: 8.7 G/DL
IMM GRANULOCYTES NFR BLD AUTO: 0.3 %
IRON SATN MFR SERPL: 6 %
IRON SERPL-MCNC: 22 UG/DL
LDH SERPL-CCNC: 161 U/L
LYMPHOCYTES # BLD AUTO: 1.32 K/UL
LYMPHOCYTES NFR BLD AUTO: 21.1 %
MAGNESIUM SERPL-MCNC: 2.1 MG/DL
MAN DIFF?: NORMAL
MCHC RBC-ENTMCNC: 25.4 PG
MCHC RBC-ENTMCNC: 28.1 GM/DL
MCV RBC AUTO: 90.6 FL
MICROALBUMIN 24H UR DL<=1MG/L-MCNC: 1.2 MG/DL
MICROALBUMIN/CREAT 24H UR-RTO: NORMAL MG/G
MONOCYTES # BLD AUTO: 0.57 K/UL
MONOCYTES NFR BLD AUTO: 9.1 %
NEUTROPHILS # BLD AUTO: 4.24 K/UL
NEUTROPHILS NFR BLD AUTO: 67.9 %
PHOSPHATE SERPL-MCNC: 4 MG/DL
PLATELET # BLD AUTO: 275 K/UL
POTASSIUM SERPL-SCNC: 5.3 MMOL/L
PROT SERPL-MCNC: 6.4 G/DL
RBC # BLD: 3.42 M/UL
RBC # BLD: 3.42 M/UL
RBC # FLD: 15.8 %
RETICS # AUTO: 2.1 %
RETICS AGGREG/RBC NFR: 72.5 K/UL
SODIUM SERPL-SCNC: 139 MMOL/L
TIBC SERPL-MCNC: 390 UG/DL
TRANSFERRIN SERPL-MCNC: 322 MG/DL
UIBC SERPL-MCNC: 368 UG/DL
WBC # FLD AUTO: 6.25 K/UL

## 2021-01-04 RX ORDER — FERROUS SULFATE TAB EC 325 MG (65 MG FE EQUIVALENT) 325 (65 FE) MG
325 (65 FE) TABLET DELAYED RESPONSE ORAL DAILY
Qty: 30 | Refills: 3 | Status: ACTIVE | COMMUNITY
Start: 2020-12-31 | End: 1900-01-01

## 2021-01-04 RX ORDER — POLYETHYLENE GLYCOL 3350 17 G/17G
17 POWDER, FOR SOLUTION ORAL
Qty: 100 | Refills: 1 | Status: ACTIVE | COMMUNITY
Start: 2020-12-31 | End: 1900-01-01

## 2021-02-03 LAB
BASOPHILS # BLD AUTO: 0.04 K/UL
BASOPHILS NFR BLD AUTO: 0.6 %
EOSINOPHIL # BLD AUTO: 0.13 K/UL
EOSINOPHIL NFR BLD AUTO: 2 %
HCT VFR BLD CALC: 40.2 %
HGB BLD-MCNC: 12.5 G/DL
IMM GRANULOCYTES NFR BLD AUTO: 0.2 %
LYMPHOCYTES # BLD AUTO: 2.25 K/UL
LYMPHOCYTES NFR BLD AUTO: 35.3 %
MAN DIFF?: NORMAL
MCHC RBC-ENTMCNC: 30 PG
MCHC RBC-ENTMCNC: 31.1 GM/DL
MCV RBC AUTO: 96.6 FL
MONOCYTES # BLD AUTO: 0.46 K/UL
MONOCYTES NFR BLD AUTO: 7.2 %
NEUTROPHILS # BLD AUTO: 3.49 K/UL
NEUTROPHILS NFR BLD AUTO: 54.7 %
PLATELET # BLD AUTO: 214 K/UL
RBC # BLD: 4.16 M/UL
RBC # FLD: 13.3 %
WBC # FLD AUTO: 6.38 K/UL

## 2021-03-05 ENCOUNTER — APPOINTMENT (OUTPATIENT)
Dept: GASTROENTEROLOGY | Facility: CLINIC | Age: 86
End: 2021-03-05
Payer: MEDICARE

## 2021-03-05 ENCOUNTER — LABORATORY RESULT (OUTPATIENT)
Age: 86
End: 2021-03-05

## 2021-03-05 VITALS
TEMPERATURE: 97.4 F | SYSTOLIC BLOOD PRESSURE: 120 MMHG | OXYGEN SATURATION: 98 % | HEIGHT: 66 IN | WEIGHT: 183 LBS | BODY MASS INDEX: 29.41 KG/M2 | HEART RATE: 87 BPM | RESPIRATION RATE: 15 BRPM | DIASTOLIC BLOOD PRESSURE: 60 MMHG

## 2021-03-05 PROCEDURE — 99204 OFFICE O/P NEW MOD 45 MIN: CPT | Mod: 25

## 2021-03-05 PROCEDURE — 99072 ADDL SUPL MATRL&STAF TM PHE: CPT

## 2021-03-05 PROCEDURE — 36415 COLL VENOUS BLD VENIPUNCTURE: CPT

## 2021-03-07 NOTE — HISTORY OF PRESENT ILLNESS
[de-identified] : 89 yo Luxembourger speaking M PMH claudication s/p RLQ angio/SFA stent 8/15, RLE DVT (on eliquis), anemia, HTN, DM2, BPH, anemia who presents after referred for evaluation of iron deficiency anemia. \par \par Patient presents to clinic visit with his daughter today who he requests to translate for him.\par \par Patient referred here for ALCON. He says after 80s he started having anemia. \par When he takes iron stool is black. \par He has not seen any rectal bleeding, or melena otherwise. No hematemesis, no GIB noted. \par \par No issues with diarrhea, no constipation. \par No abdominal pain. \par No fevers, chills, weight loss. \par \par he has some lower back pain. \par No nausea/vomiting. \par \par He is not sure when he last had colonoscopy. \par He had an EGD many times. 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

## 2021-03-07 NOTE — ASSESSMENT
[FreeTextEntry1] : 89 yo New Zealander speaking M PMH claudication s/p RLQ angio/SFA stent 8/15, RLE DVT (on eliquis), anemia, HTN, DM2, BPH, anemia who presents after referred for evaluation of iron deficiency anemia. \par \par Iron deficiency anemia\par - plan for EGD/Colonoscopy for evaluation of possible GI occult blood loss as cause of anemia (could also be related to abnormal kidney function)\par - may need capsule if above work up unrevealing\par - noted reticulocyte count is not elevated\par - continue iron supplementation \par - recheck CBC, iron studies to see if iron improving with supplementation, otherwise may need a referral to heme for possible IV\par - reviewed r/b/a/i of the procedure, patient expresses understanding and wishes to proceed. I did discuss doing a FOBT and if negative, could consider deferring the endoscopic evaluation. He and his daughter prefer to have the endoscopic evaluation done. \par - reviewed bowel preparation instructions\par - needs COVID test within 48-72 hours of the procedure\par - pre-procedure labs today\par - I asked the patient to get pre-procedure clearance from PMD (no history of documented heart issues), but given his age and peripheral vascular disease, want to ensure it is safe to stop blood thinners/eliquis\par - asked patient to stop eliquis 2 days prior to procedure, stop iron 1 week prior to procedure (he and his daughter expressed understanding)\par \par History of elevated AP\par - recheck AP today\par - if elevated, may need to check GGT as well and consider abdominal ultrasound\par - no RUQ pain or symptoms, no jaundice/icterus\par \par Follow up post-procedure

## 2021-03-12 LAB
ALBUMIN SERPL ELPH-MCNC: 3.8 G/DL
ALP BLD-CCNC: 217 U/L
ALT SERPL-CCNC: 16 U/L
ANION GAP SERPL CALC-SCNC: 10 MMOL/L
AST SERPL-CCNC: 20 U/L
BASOPHILS # BLD AUTO: 0.06 K/UL
BASOPHILS NFR BLD AUTO: 0.8 %
BILIRUB SERPL-MCNC: 0.3 MG/DL
BUN SERPL-MCNC: 27 MG/DL
CALCIUM SERPL-MCNC: 9.1 MG/DL
CHLORIDE SERPL-SCNC: 103 MMOL/L
CO2 SERPL-SCNC: 25 MMOL/L
CREAT SERPL-MCNC: 1.54 MG/DL
EOSINOPHIL # BLD AUTO: 0.17 K/UL
EOSINOPHIL NFR BLD AUTO: 2.4 %
FERRITIN SERPL-MCNC: 51 NG/ML
FOLATE SERPL-MCNC: 14.3 NG/ML
GLUCOSE SERPL-MCNC: 313 MG/DL
HCT VFR BLD CALC: 41.4 %
HGB BLD-MCNC: 12.4 G/DL
IMM GRANULOCYTES NFR BLD AUTO: 0.3 %
INR PPP: 0.99 RATIO
IRON SATN MFR SERPL: 26 %
IRON SERPL-MCNC: 90 UG/DL
LYMPHOCYTES # BLD AUTO: 1.52 K/UL
LYMPHOCYTES NFR BLD AUTO: 21.3 %
MAN DIFF?: NORMAL
MCHC RBC-ENTMCNC: 27 PG
MCHC RBC-ENTMCNC: 30 GM/DL
MCV RBC AUTO: 90 FL
MONOCYTES # BLD AUTO: 0.4 K/UL
MONOCYTES NFR BLD AUTO: 5.6 %
NEUTROPHILS # BLD AUTO: 4.97 K/UL
NEUTROPHILS NFR BLD AUTO: 69.6 %
PLATELET # BLD AUTO: 285 K/UL
POTASSIUM SERPL-SCNC: 5.4 MMOL/L
PROT SERPL-MCNC: 7 G/DL
PT BLD: 11.7 SEC
RBC # BLD: 4.6 M/UL
RBC # FLD: 20.8 %
SODIUM SERPL-SCNC: 138 MMOL/L
TIBC SERPL-MCNC: 344 UG/DL
UIBC SERPL-MCNC: 254 UG/DL
WBC # FLD AUTO: 7.14 K/UL

## 2021-03-22 ENCOUNTER — APPOINTMENT (OUTPATIENT)
Dept: INTERNAL MEDICINE | Facility: CLINIC | Age: 86
End: 2021-03-22

## 2021-05-24 ENCOUNTER — APPOINTMENT (OUTPATIENT)
Dept: GASTROENTEROLOGY | Facility: HOSPITAL | Age: 86
End: 2021-05-24

## 2021-06-14 ENCOUNTER — APPOINTMENT (OUTPATIENT)
Dept: VASCULAR SURGERY | Facility: CLINIC | Age: 86
End: 2021-06-14
Payer: MEDICARE

## 2021-06-14 PROCEDURE — 99212 OFFICE O/P EST SF 10 MIN: CPT

## 2021-06-14 PROCEDURE — 99072 ADDL SUPL MATRL&STAF TM PHE: CPT

## 2021-06-22 NOTE — ASSESSMENT
[Arterial/Venous Disease] : arterial/venous disease [Medication Management] : medication management [Foot care/Footwear] : foot care/footwear [FreeTextEntry1] : 89 y/o M with PAD, s/p Right Angio/ SFA stent on 8/13/15 presents with c/o LE discomfort and lower back achiness. On physical exam, skin intact, legs well perfused throughout. Toes are warm to touch with adequate capillary refill. \par \par Plan: \par - No vascular surgery intervention required for now. \par - I explained to pt symptoms likely secondary to bone age changes/ osteoarthritis. \par - Continue activities as tolerated. \par - He may continue to f/u here as needed.

## 2021-06-22 NOTE — ADDENDUM
[FreeTextEntry1] : This note was written by Omaira Garay on 06/14/2021 acting as scribe for Phoenix Joy M.D.\par \par I, Dr. Phoenix Philippe, personally performed the evaluation and management (E/M) services for this established patient who presents today with (an) existing condition(s).  That E/M includes conducting the examination, assessing all conditions, and (re)establishing/reinforcing a plan of care.  Today, my ACP, Kaila ORTIZ, was here to observe my evaluation and management services for this condition to be followed going forward.\par \par \par \par \par

## 2021-06-22 NOTE — PHYSICAL EXAM
[Normal Breath Sounds] : Normal breath sounds [Respiratory Effort] : normal respiratory effort [Normal Heart Sounds] : normal heart sounds [Normal Rate and Rhythm] : normal rate and rhythm [2+] : left 2+ [0] : right 0 [1+] : left 1+ [Ankle Swelling (On Exam)] : present [Ankle Swelling Bilaterally] : bilaterally  [Alert] : alert [Oriented to Person] : oriented to person [Oriented to Place] : oriented to place [Oriented to Time] : oriented to time [Calm] : calm [Varicose Veins Of Lower Extremities] : not present [JVD] : no jugular venous distention  [] : not present [de-identified] : Calm and cooperative  [de-identified] : NC/AT [de-identified] : Supple  [FreeTextEntry1] : feet warm and pink [de-identified] : Overweight  [de-identified] : FROM ambulates with a cane.

## 2021-06-22 NOTE — HISTORY OF PRESENT ILLNESS
[FreeTextEntry1] : 89 y/o Ivorian speaking M w/hx of claudication s/p RLE angio /SFA stent on 8/13/2015 presents for a follow up LEs. Pt c/o LE discomfort especially walking having to stop multiple times walking a block. He reports walking to be slower with associated lower back aches. He continues to use a cane for balance but is able to complete his activities with no issues. Denies any skin breakdown, skin discoloration, fever or chills. \par \par He is accompanied by his granddaughter today.

## 2022-01-01 ENCOUNTER — TRANSCRIPTION ENCOUNTER (OUTPATIENT)
Age: 87
End: 2022-01-01

## 2022-01-01 ENCOUNTER — EMERGENCY (EMERGENCY)
Facility: HOSPITAL | Age: 87
LOS: 1 days | Discharge: HOME | End: 2022-01-01
Attending: EMERGENCY MEDICINE | Admitting: EMERGENCY MEDICINE
Payer: MEDICARE

## 2022-01-01 ENCOUNTER — INPATIENT (INPATIENT)
Facility: HOSPITAL | Age: 87
LOS: 3 days | Discharge: HOSPICE | DRG: 871 | End: 2022-07-29
Attending: HOSPITALIST | Admitting: STUDENT IN AN ORGANIZED HEALTH CARE EDUCATION/TRAINING PROGRAM
Payer: MEDICARE

## 2022-01-01 ENCOUNTER — RESULT REVIEW (OUTPATIENT)
Age: 87
End: 2022-01-01

## 2022-01-01 ENCOUNTER — INPATIENT (INPATIENT)
Facility: HOSPITAL | Age: 87
LOS: 1 days | DRG: 871 | End: 2022-09-12
Attending: INTERNAL MEDICINE | Admitting: INTERNAL MEDICINE
Payer: MEDICARE

## 2022-01-01 VITALS
OXYGEN SATURATION: 98 % | DIASTOLIC BLOOD PRESSURE: 80 MMHG | HEART RATE: 87 BPM | RESPIRATION RATE: 18 BRPM | SYSTOLIC BLOOD PRESSURE: 135 MMHG

## 2022-01-01 VITALS
HEIGHT: 67 IN | OXYGEN SATURATION: 100 % | WEIGHT: 166.89 LBS | HEART RATE: 106 BPM | DIASTOLIC BLOOD PRESSURE: 63 MMHG | SYSTOLIC BLOOD PRESSURE: 141 MMHG | TEMPERATURE: 97 F | RESPIRATION RATE: 18 BRPM

## 2022-01-01 VITALS
RESPIRATION RATE: 18 BRPM | HEART RATE: 109 BPM | TEMPERATURE: 98 F | OXYGEN SATURATION: 96 % | DIASTOLIC BLOOD PRESSURE: 69 MMHG | SYSTOLIC BLOOD PRESSURE: 125 MMHG

## 2022-01-01 VITALS
OXYGEN SATURATION: 97 % | HEART RATE: 117 BPM | HEIGHT: 67 IN | WEIGHT: 149.91 LBS | RESPIRATION RATE: 18 BRPM | SYSTOLIC BLOOD PRESSURE: 104 MMHG | DIASTOLIC BLOOD PRESSURE: 64 MMHG | TEMPERATURE: 99 F

## 2022-01-01 VITALS
WEIGHT: 145.06 LBS | RESPIRATION RATE: 20 BRPM | DIASTOLIC BLOOD PRESSURE: 63 MMHG | HEIGHT: 67 IN | OXYGEN SATURATION: 98 % | SYSTOLIC BLOOD PRESSURE: 101 MMHG | TEMPERATURE: 98 F | HEART RATE: 136 BPM

## 2022-01-01 VITALS
OXYGEN SATURATION: 90 % | HEART RATE: 106 BPM | RESPIRATION RATE: 24 BRPM | TEMPERATURE: 97 F | DIASTOLIC BLOOD PRESSURE: 58 MMHG | SYSTOLIC BLOOD PRESSURE: 82 MMHG

## 2022-01-01 DIAGNOSIS — E78.5 HYPERLIPIDEMIA, UNSPECIFIED: ICD-10-CM

## 2022-01-01 DIAGNOSIS — N40.0 BENIGN PROSTATIC HYPERPLASIA WITHOUT LOWER URINARY TRACT SYMPTOMS: ICD-10-CM

## 2022-01-01 DIAGNOSIS — E11.9 TYPE 2 DIABETES MELLITUS WITHOUT COMPLICATIONS: ICD-10-CM

## 2022-01-01 DIAGNOSIS — Z95.828 PRESENCE OF OTHER VASCULAR IMPLANTS AND GRAFTS: Chronic | ICD-10-CM

## 2022-01-01 DIAGNOSIS — I10 ESSENTIAL (PRIMARY) HYPERTENSION: ICD-10-CM

## 2022-01-01 DIAGNOSIS — Z98.890 OTHER SPECIFIED POSTPROCEDURAL STATES: Chronic | ICD-10-CM

## 2022-01-01 DIAGNOSIS — C16.9 MALIGNANT NEOPLASM OF STOMACH, UNSPECIFIED: ICD-10-CM

## 2022-01-01 DIAGNOSIS — C18.9 MALIGNANT NEOPLASM OF COLON, UNSPECIFIED: ICD-10-CM

## 2022-01-01 DIAGNOSIS — R62.7 ADULT FAILURE TO THRIVE: ICD-10-CM

## 2022-01-01 DIAGNOSIS — J90 PLEURAL EFFUSION, NOT ELSEWHERE CLASSIFIED: ICD-10-CM

## 2022-01-01 DIAGNOSIS — R06.02 SHORTNESS OF BREATH: ICD-10-CM

## 2022-01-01 DIAGNOSIS — H26.9 UNSPECIFIED CATARACT: Chronic | ICD-10-CM

## 2022-01-01 DIAGNOSIS — Z79.82 LONG TERM (CURRENT) USE OF ASPIRIN: ICD-10-CM

## 2022-01-01 DIAGNOSIS — Z86.718 PERSONAL HISTORY OF OTHER VENOUS THROMBOSIS AND EMBOLISM: ICD-10-CM

## 2022-01-01 DIAGNOSIS — Z79.01 LONG TERM (CURRENT) USE OF ANTICOAGULANTS: ICD-10-CM

## 2022-01-01 DIAGNOSIS — Z79.84 LONG TERM (CURRENT) USE OF ORAL HYPOGLYCEMIC DRUGS: ICD-10-CM

## 2022-01-01 DIAGNOSIS — I12.9 HYPERTENSIVE CHRONIC KIDNEY DISEASE WITH STAGE 1 THROUGH STAGE 4 CHRONIC KIDNEY DISEASE, OR UNSPECIFIED CHRONIC KIDNEY DISEASE: ICD-10-CM

## 2022-01-01 DIAGNOSIS — A41.9 SEPSIS, UNSPECIFIED ORGANISM: ICD-10-CM

## 2022-01-01 DIAGNOSIS — R05.9 COUGH, UNSPECIFIED: ICD-10-CM

## 2022-01-01 DIAGNOSIS — I26.99 OTHER PULMONARY EMBOLISM WITHOUT ACUTE COR PULMONALE: ICD-10-CM

## 2022-01-01 DIAGNOSIS — R63.8 OTHER SYMPTOMS AND SIGNS CONCERNING FOOD AND FLUID INTAKE: ICD-10-CM

## 2022-01-01 DIAGNOSIS — R53.1 WEAKNESS: ICD-10-CM

## 2022-01-01 DIAGNOSIS — E86.0 DEHYDRATION: ICD-10-CM

## 2022-01-01 DIAGNOSIS — Z66 DO NOT RESUSCITATE: ICD-10-CM

## 2022-01-01 DIAGNOSIS — J98.11 ATELECTASIS: ICD-10-CM

## 2022-01-01 DIAGNOSIS — N18.30 CHRONIC KIDNEY DISEASE, STAGE 3 UNSPECIFIED: ICD-10-CM

## 2022-01-01 DIAGNOSIS — J18.9 PNEUMONIA, UNSPECIFIED ORGANISM: ICD-10-CM

## 2022-01-01 DIAGNOSIS — D50.9 IRON DEFICIENCY ANEMIA, UNSPECIFIED: ICD-10-CM

## 2022-01-01 DIAGNOSIS — Z51.5 ENCOUNTER FOR PALLIATIVE CARE: ICD-10-CM

## 2022-01-01 DIAGNOSIS — Z29.9 ENCOUNTER FOR PROPHYLACTIC MEASURES, UNSPECIFIED: ICD-10-CM

## 2022-01-01 DIAGNOSIS — R53.81 OTHER MALAISE: ICD-10-CM

## 2022-01-01 DIAGNOSIS — E11.22 TYPE 2 DIABETES MELLITUS WITH DIABETIC CHRONIC KIDNEY DISEASE: ICD-10-CM

## 2022-01-01 DIAGNOSIS — Z20.822 CONTACT WITH AND (SUSPECTED) EXPOSURE TO COVID-19: ICD-10-CM

## 2022-01-01 DIAGNOSIS — E43 UNSPECIFIED SEVERE PROTEIN-CALORIE MALNUTRITION: ICD-10-CM

## 2022-01-01 LAB
A1C WITH ESTIMATED AVERAGE GLUCOSE RESULT: 7.5 % — HIGH (ref 4–5.6)
ALBUMIN FLD-MCNC: 1.9 G/DL — SIGNIFICANT CHANGE UP
ALBUMIN SERPL ELPH-MCNC: 2.3 G/DL — LOW (ref 3.3–5)
ALBUMIN SERPL ELPH-MCNC: 2.4 G/DL — LOW (ref 3.3–5)
ALBUMIN SERPL ELPH-MCNC: 2.5 G/DL — LOW (ref 3.3–5)
ALBUMIN SERPL ELPH-MCNC: 3 G/DL — LOW (ref 3.3–5)
ALBUMIN SERPL ELPH-MCNC: 3.1 G/DL — LOW (ref 3.3–5)
ALP SERPL-CCNC: 178 U/L — HIGH (ref 40–120)
ALP SERPL-CCNC: 191 U/L — HIGH (ref 40–120)
ALP SERPL-CCNC: 213 U/L — HIGH (ref 40–120)
ALP SERPL-CCNC: 213 U/L — HIGH (ref 40–120)
ALP SERPL-CCNC: 217 U/L — HIGH (ref 40–120)
ALT FLD-CCNC: 10 U/L — SIGNIFICANT CHANGE UP (ref 10–45)
ALT FLD-CCNC: 11 U/L — SIGNIFICANT CHANGE UP (ref 10–45)
ALT FLD-CCNC: 12 U/L — SIGNIFICANT CHANGE UP (ref 10–45)
ALT FLD-CCNC: 14 U/L — SIGNIFICANT CHANGE UP (ref 10–45)
ALT FLD-CCNC: 15 U/L — SIGNIFICANT CHANGE UP (ref 10–45)
ANION GAP SERPL CALC-SCNC: 10 MMOL/L — SIGNIFICANT CHANGE UP (ref 5–17)
ANION GAP SERPL CALC-SCNC: 10 MMOL/L — SIGNIFICANT CHANGE UP (ref 5–17)
ANION GAP SERPL CALC-SCNC: 11 MMOL/L — SIGNIFICANT CHANGE UP (ref 5–17)
ANION GAP SERPL CALC-SCNC: 14 MMOL/L — SIGNIFICANT CHANGE UP (ref 5–17)
ANION GAP SERPL CALC-SCNC: 15 MMOL/L — SIGNIFICANT CHANGE UP (ref 5–17)
ANION GAP SERPL CALC-SCNC: 8 MMOL/L — SIGNIFICANT CHANGE UP (ref 5–17)
ANION GAP SERPL CALC-SCNC: 8 MMOL/L — SIGNIFICANT CHANGE UP (ref 5–17)
ANISOCYTOSIS BLD QL: SLIGHT — SIGNIFICANT CHANGE UP
ANISOCYTOSIS BLD QL: SLIGHT — SIGNIFICANT CHANGE UP
APPEARANCE UR: CLEAR — SIGNIFICANT CHANGE UP
APTT BLD: 32.1 SEC — SIGNIFICANT CHANGE UP (ref 27.5–35.5)
APTT BLD: 33.3 SEC — SIGNIFICANT CHANGE UP (ref 27.5–35.5)
AST SERPL-CCNC: 21 U/L — SIGNIFICANT CHANGE UP (ref 10–40)
AST SERPL-CCNC: 24 U/L — SIGNIFICANT CHANGE UP (ref 10–40)
AST SERPL-CCNC: 24 U/L — SIGNIFICANT CHANGE UP (ref 10–40)
AST SERPL-CCNC: 28 U/L — SIGNIFICANT CHANGE UP (ref 10–40)
AST SERPL-CCNC: 28 U/L — SIGNIFICANT CHANGE UP (ref 10–40)
B PERT IGG+IGM PNL SER: CLEAR — SIGNIFICANT CHANGE UP
BACTERIA # UR AUTO: PRESENT /HPF
BASE EXCESS BLDV CALC-SCNC: -2.6 MMOL/L — LOW (ref -2–3)
BASE EXCESS BLDV CALC-SCNC: 2.2 MMOL/L — SIGNIFICANT CHANGE UP (ref -2–3)
BASOPHILS # BLD AUTO: 0 K/UL — SIGNIFICANT CHANGE UP (ref 0–0.2)
BASOPHILS # BLD AUTO: 0.03 K/UL — SIGNIFICANT CHANGE UP (ref 0–0.2)
BASOPHILS # BLD AUTO: 0.04 K/UL — SIGNIFICANT CHANGE UP (ref 0–0.2)
BASOPHILS # BLD AUTO: 0.04 K/UL — SIGNIFICANT CHANGE UP (ref 0–0.2)
BASOPHILS # BLD AUTO: 0.06 K/UL — SIGNIFICANT CHANGE UP (ref 0–0.2)
BASOPHILS # BLD AUTO: 0.27 K/UL — HIGH (ref 0–0.2)
BASOPHILS NFR BLD AUTO: 0 % — SIGNIFICANT CHANGE UP (ref 0–2)
BASOPHILS NFR BLD AUTO: 0.4 % — SIGNIFICANT CHANGE UP (ref 0–2)
BASOPHILS NFR BLD AUTO: 0.5 % — SIGNIFICANT CHANGE UP (ref 0–2)
BASOPHILS NFR BLD AUTO: 0.6 % — SIGNIFICANT CHANGE UP (ref 0–2)
BASOPHILS NFR BLD AUTO: 0.6 % — SIGNIFICANT CHANGE UP (ref 0–2)
BASOPHILS NFR BLD AUTO: 3.6 % — HIGH (ref 0–2)
BILIRUB SERPL-MCNC: 0.2 MG/DL — SIGNIFICANT CHANGE UP (ref 0.2–1.2)
BILIRUB SERPL-MCNC: 0.2 MG/DL — SIGNIFICANT CHANGE UP (ref 0.2–1.2)
BILIRUB SERPL-MCNC: 0.5 MG/DL — SIGNIFICANT CHANGE UP (ref 0.2–1.2)
BILIRUB SERPL-MCNC: 0.5 MG/DL — SIGNIFICANT CHANGE UP (ref 0.2–1.2)
BILIRUB SERPL-MCNC: 0.7 MG/DL — SIGNIFICANT CHANGE UP (ref 0.2–1.2)
BILIRUB UR-MCNC: ABNORMAL
BILIRUB UR-MCNC: NEGATIVE — SIGNIFICANT CHANGE UP
BUN SERPL-MCNC: 13 MG/DL — SIGNIFICANT CHANGE UP (ref 7–23)
BUN SERPL-MCNC: 13 MG/DL — SIGNIFICANT CHANGE UP (ref 7–23)
BUN SERPL-MCNC: 14 MG/DL — SIGNIFICANT CHANGE UP (ref 7–23)
BUN SERPL-MCNC: 15 MG/DL — SIGNIFICANT CHANGE UP (ref 7–23)
BUN SERPL-MCNC: 17 MG/DL — SIGNIFICANT CHANGE UP (ref 7–23)
BUN SERPL-MCNC: 18 MG/DL — SIGNIFICANT CHANGE UP (ref 7–23)
BUN SERPL-MCNC: 19 MG/DL — SIGNIFICANT CHANGE UP (ref 7–23)
BURR CELLS BLD QL SMEAR: PRESENT — SIGNIFICANT CHANGE UP
CA-I SERPL-SCNC: 1.09 MMOL/L — LOW (ref 1.15–1.33)
CA-I SERPL-SCNC: 1.21 MMOL/L — SIGNIFICANT CHANGE UP (ref 1.15–1.33)
CALCIUM SERPL-MCNC: 7.8 MG/DL — LOW (ref 8.4–10.5)
CALCIUM SERPL-MCNC: 8.2 MG/DL — LOW (ref 8.4–10.5)
CALCIUM SERPL-MCNC: 8.2 MG/DL — LOW (ref 8.4–10.5)
CALCIUM SERPL-MCNC: 8.3 MG/DL — LOW (ref 8.4–10.5)
CALCIUM SERPL-MCNC: 8.4 MG/DL — SIGNIFICANT CHANGE UP (ref 8.4–10.5)
CALCIUM SERPL-MCNC: 8.5 MG/DL — SIGNIFICANT CHANGE UP (ref 8.4–10.5)
CALCIUM SERPL-MCNC: 8.6 MG/DL — SIGNIFICANT CHANGE UP (ref 8.4–10.5)
CHLORIDE SERPL-SCNC: 102 MMOL/L — SIGNIFICANT CHANGE UP (ref 96–108)
CHLORIDE SERPL-SCNC: 102 MMOL/L — SIGNIFICANT CHANGE UP (ref 96–108)
CHLORIDE SERPL-SCNC: 104 MMOL/L — SIGNIFICANT CHANGE UP (ref 96–108)
CHLORIDE SERPL-SCNC: 105 MMOL/L — SIGNIFICANT CHANGE UP (ref 96–108)
CHLORIDE SERPL-SCNC: 106 MMOL/L — SIGNIFICANT CHANGE UP (ref 96–108)
CHLORIDE SERPL-SCNC: 96 MMOL/L — SIGNIFICANT CHANGE UP (ref 96–108)
CHLORIDE SERPL-SCNC: 97 MMOL/L — SIGNIFICANT CHANGE UP (ref 96–108)
CHOLEST FLD-MCNC: 61 MG/DL — SIGNIFICANT CHANGE UP
CO2 BLDV-SCNC: 22.9 MMOL/L — SIGNIFICANT CHANGE UP (ref 22–26)
CO2 BLDV-SCNC: 29.8 MMOL/L — HIGH (ref 22–26)
CO2 SERPL-SCNC: 20 MMOL/L — LOW (ref 22–31)
CO2 SERPL-SCNC: 22 MMOL/L — SIGNIFICANT CHANGE UP (ref 22–31)
CO2 SERPL-SCNC: 23 MMOL/L — SIGNIFICANT CHANGE UP (ref 22–31)
CO2 SERPL-SCNC: 26 MMOL/L — SIGNIFICANT CHANGE UP (ref 22–31)
COLOR FLD: YELLOW — SIGNIFICANT CHANGE UP
COLOR SPEC: YELLOW — SIGNIFICANT CHANGE UP
COMMENT - URINE: SIGNIFICANT CHANGE UP
CREAT SERPL-MCNC: 1.16 MG/DL — SIGNIFICANT CHANGE UP (ref 0.5–1.3)
CREAT SERPL-MCNC: 1.16 MG/DL — SIGNIFICANT CHANGE UP (ref 0.5–1.3)
CREAT SERPL-MCNC: 1.19 MG/DL — SIGNIFICANT CHANGE UP (ref 0.5–1.3)
CREAT SERPL-MCNC: 1.2 MG/DL — SIGNIFICANT CHANGE UP (ref 0.5–1.3)
CREAT SERPL-MCNC: 1.38 MG/DL — HIGH (ref 0.5–1.3)
CREAT SERPL-MCNC: 1.48 MG/DL — HIGH (ref 0.5–1.3)
CREAT SERPL-MCNC: 1.66 MG/DL — HIGH (ref 0.5–1.3)
CULTURE RESULTS: NO GROWTH — SIGNIFICANT CHANGE UP
CULTURE RESULTS: NO GROWTH — SIGNIFICANT CHANGE UP
CULTURE RESULTS: SIGNIFICANT CHANGE UP
CULTURE RESULTS: SIGNIFICANT CHANGE UP
DIFF PNL FLD: ABNORMAL
DIFF PNL FLD: ABNORMAL
DIFF PNL FLD: NEGATIVE — SIGNIFICANT CHANGE UP
EGFR: 37 ML/MIN/1.73M2 — LOW
EGFR: 43 ML/MIN/1.73M2 — LOW
EGFR: 47 ML/MIN/1.73M2 — LOW
EGFR: 55 ML/MIN/1.73M2 — LOW
EGFR: 56 ML/MIN/1.73M2 — LOW
EGFR: 58 ML/MIN/1.73M2 — LOW
EGFR: 58 ML/MIN/1.73M2 — LOW
EOSINOPHIL # BLD AUTO: 0.02 K/UL — SIGNIFICANT CHANGE UP (ref 0–0.5)
EOSINOPHIL # BLD AUTO: 0.14 K/UL — SIGNIFICANT CHANGE UP (ref 0–0.5)
EOSINOPHIL # BLD AUTO: 0.23 K/UL — SIGNIFICANT CHANGE UP (ref 0–0.5)
EOSINOPHIL # BLD AUTO: 0.24 K/UL — SIGNIFICANT CHANGE UP (ref 0–0.5)
EOSINOPHIL # FLD: 2 % — SIGNIFICANT CHANGE UP
EOSINOPHIL NFR BLD AUTO: 0.3 % — SIGNIFICANT CHANGE UP (ref 0–6)
EOSINOPHIL NFR BLD AUTO: 0.9 % — SIGNIFICANT CHANGE UP (ref 0–6)
EOSINOPHIL NFR BLD AUTO: 1.5 % — SIGNIFICANT CHANGE UP (ref 0–6)
EOSINOPHIL NFR BLD AUTO: 1.8 % — SIGNIFICANT CHANGE UP (ref 0–6)
EOSINOPHIL NFR BLD AUTO: 3.3 % — SIGNIFICANT CHANGE UP (ref 0–6)
EOSINOPHIL NFR BLD AUTO: 3.3 % — SIGNIFICANT CHANGE UP (ref 0–6)
EPI CELLS # UR: SIGNIFICANT CHANGE UP /HPF (ref 0–5)
ESTIMATED AVERAGE GLUCOSE: 169 MG/DL — HIGH (ref 68–114)
FLUID INTAKE SUBSTANCE CLASS: SIGNIFICANT CHANGE UP
GAS PNL BLDV: 127 MMOL/L — LOW (ref 136–145)
GAS PNL BLDV: 138 MMOL/L — SIGNIFICANT CHANGE UP (ref 136–145)
GAS PNL BLDV: SIGNIFICANT CHANGE UP
GIANT PLATELETS BLD QL SMEAR: PRESENT — SIGNIFICANT CHANGE UP
GIANT PLATELETS BLD QL SMEAR: PRESENT — SIGNIFICANT CHANGE UP
GLUCOSE BLDC GLUCOMTR-MCNC: 116 MG/DL — HIGH (ref 70–99)
GLUCOSE BLDC GLUCOMTR-MCNC: 128 MG/DL — HIGH (ref 70–99)
GLUCOSE BLDC GLUCOMTR-MCNC: 141 MG/DL — HIGH (ref 70–99)
GLUCOSE BLDC GLUCOMTR-MCNC: 144 MG/DL — HIGH (ref 70–99)
GLUCOSE BLDC GLUCOMTR-MCNC: 150 MG/DL — HIGH (ref 70–99)
GLUCOSE BLDC GLUCOMTR-MCNC: 154 MG/DL — HIGH (ref 70–99)
GLUCOSE BLDC GLUCOMTR-MCNC: 156 MG/DL — HIGH (ref 70–99)
GLUCOSE BLDC GLUCOMTR-MCNC: 163 MG/DL — HIGH (ref 70–99)
GLUCOSE BLDC GLUCOMTR-MCNC: 172 MG/DL — HIGH (ref 70–99)
GLUCOSE BLDC GLUCOMTR-MCNC: 172 MG/DL — HIGH (ref 70–99)
GLUCOSE BLDC GLUCOMTR-MCNC: 175 MG/DL — HIGH (ref 70–99)
GLUCOSE BLDC GLUCOMTR-MCNC: 176 MG/DL — HIGH (ref 70–99)
GLUCOSE BLDC GLUCOMTR-MCNC: 180 MG/DL — HIGH (ref 70–99)
GLUCOSE BLDC GLUCOMTR-MCNC: 187 MG/DL — HIGH (ref 70–99)
GLUCOSE BLDC GLUCOMTR-MCNC: 190 MG/DL — HIGH (ref 70–99)
GLUCOSE BLDC GLUCOMTR-MCNC: 208 MG/DL — HIGH (ref 70–99)
GLUCOSE BLDC GLUCOMTR-MCNC: 216 MG/DL — HIGH (ref 70–99)
GLUCOSE BLDC GLUCOMTR-MCNC: 217 MG/DL — HIGH (ref 70–99)
GLUCOSE BLDC GLUCOMTR-MCNC: 229 MG/DL — HIGH (ref 70–99)
GLUCOSE BLDC GLUCOMTR-MCNC: 253 MG/DL — HIGH (ref 70–99)
GLUCOSE BLDC GLUCOMTR-MCNC: 266 MG/DL — HIGH (ref 70–99)
GLUCOSE BLDC GLUCOMTR-MCNC: 80 MG/DL — SIGNIFICANT CHANGE UP (ref 70–99)
GLUCOSE FLD-MCNC: 221 MG/DL — SIGNIFICANT CHANGE UP
GLUCOSE SERPL-MCNC: 157 MG/DL — HIGH (ref 70–99)
GLUCOSE SERPL-MCNC: 162 MG/DL — HIGH (ref 70–99)
GLUCOSE SERPL-MCNC: 167 MG/DL — HIGH (ref 70–99)
GLUCOSE SERPL-MCNC: 185 MG/DL — HIGH (ref 70–99)
GLUCOSE SERPL-MCNC: 185 MG/DL — HIGH (ref 70–99)
GLUCOSE SERPL-MCNC: 195 MG/DL — HIGH (ref 70–99)
GLUCOSE SERPL-MCNC: 209 MG/DL — HIGH (ref 70–99)
GLUCOSE UR QL: 100
GLUCOSE UR QL: NEGATIVE — SIGNIFICANT CHANGE UP
GRAM STN FLD: SIGNIFICANT CHANGE UP
HCO3 BLDV-SCNC: 22 MMOL/L — SIGNIFICANT CHANGE UP (ref 22–29)
HCO3 BLDV-SCNC: 28 MMOL/L — SIGNIFICANT CHANGE UP (ref 22–29)
HCT VFR BLD CALC: 31.2 % — LOW (ref 39–50)
HCT VFR BLD CALC: 31.7 % — LOW (ref 39–50)
HCT VFR BLD CALC: 32.2 % — LOW (ref 39–50)
HCT VFR BLD CALC: 33.4 % — LOW (ref 39–50)
HCT VFR BLD CALC: 35.6 % — LOW (ref 39–50)
HCT VFR BLD CALC: 37.1 % — LOW (ref 39–50)
HCT VFR BLD CALC: 37.2 % — LOW (ref 39–50)
HGB BLD-MCNC: 10.4 G/DL — LOW (ref 13–17)
HGB BLD-MCNC: 10.4 G/DL — LOW (ref 13–17)
HGB BLD-MCNC: 11.4 G/DL — LOW (ref 13–17)
HGB BLD-MCNC: 11.5 G/DL — LOW (ref 13–17)
HGB BLD-MCNC: 9.7 G/DL — LOW (ref 13–17)
HGB BLD-MCNC: 9.7 G/DL — LOW (ref 13–17)
HGB BLD-MCNC: 9.8 G/DL — LOW (ref 13–17)
HYALINE CASTS # UR AUTO: ABNORMAL /LPF (ref 0–2)
HYPOCHROMIA BLD QL: SIGNIFICANT CHANGE UP
IMM GRANULOCYTES NFR BLD AUTO: 0.4 % — SIGNIFICANT CHANGE UP (ref 0–1.5)
IMM GRANULOCYTES NFR BLD AUTO: 0.6 % — SIGNIFICANT CHANGE UP (ref 0–1.5)
IMM GRANULOCYTES NFR BLD AUTO: 0.6 % — SIGNIFICANT CHANGE UP (ref 0–1.5)
IMM GRANULOCYTES NFR BLD AUTO: 0.8 % — SIGNIFICANT CHANGE UP (ref 0–1.5)
INR BLD: 1.09 — SIGNIFICANT CHANGE UP (ref 0.88–1.16)
INR BLD: 1.14 — SIGNIFICANT CHANGE UP (ref 0.88–1.16)
KETONES UR-MCNC: ABNORMAL MG/DL
KETONES UR-MCNC: NEGATIVE — SIGNIFICANT CHANGE UP
LACTATE SERPL-SCNC: 1.6 MMOL/L — SIGNIFICANT CHANGE UP (ref 0.5–2)
LACTATE SERPL-SCNC: 14.6 MMOL/L — CRITICAL HIGH (ref 0.5–2)
LACTATE SERPL-SCNC: 2 MMOL/L — SIGNIFICANT CHANGE UP (ref 0.5–2)
LACTATE SERPL-SCNC: 4.7 MMOL/L — CRITICAL HIGH (ref 0.5–2)
LDH SERPL L TO P-CCNC: 168 U/L — SIGNIFICANT CHANGE UP
LDH SERPL L TO P-CCNC: SIGNIFICANT CHANGE UP (ref 50–242)
LEGIONELLA AG UR QL: NEGATIVE — SIGNIFICANT CHANGE UP
LEUKOCYTE ESTERASE UR-ACNC: NEGATIVE — SIGNIFICANT CHANGE UP
LYMPHOCYTES # BLD AUTO: 0.68 K/UL — LOW (ref 1–3.3)
LYMPHOCYTES # BLD AUTO: 0.73 K/UL — LOW (ref 1–3.3)
LYMPHOCYTES # BLD AUTO: 1.35 K/UL — SIGNIFICANT CHANGE UP (ref 1–3.3)
LYMPHOCYTES # BLD AUTO: 1.8 K/UL — SIGNIFICANT CHANGE UP (ref 1–3.3)
LYMPHOCYTES # BLD AUTO: 1.86 K/UL — SIGNIFICANT CHANGE UP (ref 1–3.3)
LYMPHOCYTES # BLD AUTO: 1.9 K/UL — SIGNIFICANT CHANGE UP (ref 1–3.3)
LYMPHOCYTES # BLD AUTO: 19.2 % — SIGNIFICANT CHANGE UP (ref 13–44)
LYMPHOCYTES # BLD AUTO: 26.1 % — SIGNIFICANT CHANGE UP (ref 13–44)
LYMPHOCYTES # BLD AUTO: 27 % — SIGNIFICANT CHANGE UP (ref 13–44)
LYMPHOCYTES # BLD AUTO: 8.7 % — LOW (ref 13–44)
LYMPHOCYTES # BLD AUTO: 9.2 % — LOW (ref 13–44)
LYMPHOCYTES # BLD AUTO: 9.7 % — LOW (ref 13–44)
LYMPHOCYTES # FLD: 50 % — SIGNIFICANT CHANGE UP
MAGNESIUM SERPL-MCNC: 1.6 MG/DL — SIGNIFICANT CHANGE UP (ref 1.6–2.6)
MAGNESIUM SERPL-MCNC: 1.9 MG/DL — SIGNIFICANT CHANGE UP (ref 1.6–2.6)
MAGNESIUM SERPL-MCNC: 1.9 MG/DL — SIGNIFICANT CHANGE UP (ref 1.6–2.6)
MAGNESIUM SERPL-MCNC: 2.3 MG/DL — SIGNIFICANT CHANGE UP (ref 1.6–2.6)
MANUAL SMEAR VERIFICATION: SIGNIFICANT CHANGE UP
MANUAL SMEAR VERIFICATION: SIGNIFICANT CHANGE UP
MCHC RBC-ENTMCNC: 25.4 PG — LOW (ref 27–34)
MCHC RBC-ENTMCNC: 26.9 PG — LOW (ref 27–34)
MCHC RBC-ENTMCNC: 27 PG — SIGNIFICANT CHANGE UP (ref 27–34)
MCHC RBC-ENTMCNC: 27.4 PG — SIGNIFICANT CHANGE UP (ref 27–34)
MCHC RBC-ENTMCNC: 28.2 PG — SIGNIFICANT CHANGE UP (ref 27–34)
MCHC RBC-ENTMCNC: 28.3 PG — SIGNIFICANT CHANGE UP (ref 27–34)
MCHC RBC-ENTMCNC: 28.3 PG — SIGNIFICANT CHANGE UP (ref 27–34)
MCHC RBC-ENTMCNC: 29.2 GM/DL — LOW (ref 32–36)
MCHC RBC-ENTMCNC: 30.1 GM/DL — LOW (ref 32–36)
MCHC RBC-ENTMCNC: 30.6 GM/DL — LOW (ref 32–36)
MCHC RBC-ENTMCNC: 30.9 GM/DL — LOW (ref 32–36)
MCHC RBC-ENTMCNC: 31 GM/DL — LOW (ref 32–36)
MCHC RBC-ENTMCNC: 31.1 GM/DL — LOW (ref 32–36)
MCHC RBC-ENTMCNC: 31.1 GM/DL — LOW (ref 32–36)
MCV RBC AUTO: 86.7 FL — SIGNIFICANT CHANGE UP (ref 80–100)
MCV RBC AUTO: 87 FL — SIGNIFICANT CHANGE UP (ref 80–100)
MCV RBC AUTO: 87.9 FL — SIGNIFICANT CHANGE UP (ref 80–100)
MCV RBC AUTO: 90.8 FL — SIGNIFICANT CHANGE UP (ref 80–100)
MCV RBC AUTO: 91 FL — SIGNIFICANT CHANGE UP (ref 80–100)
MCV RBC AUTO: 91 FL — SIGNIFICANT CHANGE UP (ref 80–100)
MCV RBC AUTO: 91.4 FL — SIGNIFICANT CHANGE UP (ref 80–100)
MESOTHL CELL # FLD: 1 % — SIGNIFICANT CHANGE UP
METAMYELOCYTES # FLD: 0.9 % — HIGH (ref 0–0)
MICROCYTES BLD QL: SLIGHT — SIGNIFICANT CHANGE UP
MICROCYTES BLD QL: SLIGHT — SIGNIFICANT CHANGE UP
MONOCYTES # BLD AUTO: 0.26 K/UL — SIGNIFICANT CHANGE UP (ref 0–0.9)
MONOCYTES # BLD AUTO: 0.26 K/UL — SIGNIFICANT CHANGE UP (ref 0–0.9)
MONOCYTES # BLD AUTO: 0.56 K/UL — SIGNIFICANT CHANGE UP (ref 0–0.9)
MONOCYTES # BLD AUTO: 0.62 K/UL — SIGNIFICANT CHANGE UP (ref 0–0.9)
MONOCYTES # BLD AUTO: 0.67 K/UL — SIGNIFICANT CHANGE UP (ref 0–0.9)
MONOCYTES # BLD AUTO: 0.73 K/UL — SIGNIFICANT CHANGE UP (ref 0–0.9)
MONOCYTES NFR BLD AUTO: 3.5 % — SIGNIFICANT CHANGE UP (ref 2–14)
MONOCYTES NFR BLD AUTO: 3.5 % — SIGNIFICANT CHANGE UP (ref 2–14)
MONOCYTES NFR BLD AUTO: 4.3 % — SIGNIFICANT CHANGE UP (ref 2–14)
MONOCYTES NFR BLD AUTO: 7.7 % — SIGNIFICANT CHANGE UP (ref 2–14)
MONOCYTES NFR BLD AUTO: 7.8 % — SIGNIFICANT CHANGE UP (ref 2–14)
MONOCYTES NFR BLD AUTO: 9 % — SIGNIFICANT CHANGE UP (ref 2–14)
MONOS+MACROS # FLD: 17 % — SIGNIFICANT CHANGE UP
MYELOCYTES NFR BLD: 1.7 % — HIGH (ref 0–0)
NEUTROPHILS # BLD AUTO: 12.93 K/UL — HIGH (ref 1.8–7.4)
NEUTROPHILS # BLD AUTO: 4.1 K/UL — SIGNIFICANT CHANGE UP (ref 1.8–7.4)
NEUTROPHILS # BLD AUTO: 4.52 K/UL — SIGNIFICANT CHANGE UP (ref 1.8–7.4)
NEUTROPHILS # BLD AUTO: 6.12 K/UL — SIGNIFICANT CHANGE UP (ref 1.8–7.4)
NEUTROPHILS # BLD AUTO: 6.31 K/UL — SIGNIFICANT CHANGE UP (ref 1.8–7.4)
NEUTROPHILS # BLD AUTO: 6.6 K/UL — SIGNIFICANT CHANGE UP (ref 1.8–7.4)
NEUTROPHILS NFR BLD AUTO: 59.5 % — SIGNIFICANT CHANGE UP (ref 43–77)
NEUTROPHILS NFR BLD AUTO: 62 % — SIGNIFICANT CHANGE UP (ref 43–77)
NEUTROPHILS NFR BLD AUTO: 70.3 % — SIGNIFICANT CHANGE UP (ref 43–77)
NEUTROPHILS NFR BLD AUTO: 80.9 % — HIGH (ref 43–77)
NEUTROPHILS NFR BLD AUTO: 81.4 % — HIGH (ref 43–77)
NEUTROPHILS NFR BLD AUTO: 85.8 % — HIGH (ref 43–77)
NEUTROPHILS-BODY FLUID: 30 % — SIGNIFICANT CHANGE UP
NEUTS BAND # BLD: 2.6 % — SIGNIFICANT CHANGE UP (ref 0–8)
NITRITE UR-MCNC: NEGATIVE — SIGNIFICANT CHANGE UP
NON-GYNECOLOGICAL CYTOLOGY STUDY: SIGNIFICANT CHANGE UP
NRBC # BLD: 0 /100 WBCS — SIGNIFICANT CHANGE UP (ref 0–0)
OVALOCYTES BLD QL SMEAR: SLIGHT — SIGNIFICANT CHANGE UP
PCO2 BLDV: 36 MMHG — LOW (ref 42–55)
PCO2 BLDV: 49 MMHG — SIGNIFICANT CHANGE UP (ref 42–55)
PH BLDV: 7.37 — SIGNIFICANT CHANGE UP (ref 7.32–7.43)
PH BLDV: 7.39 — SIGNIFICANT CHANGE UP (ref 7.32–7.43)
PH UR: 5.5 — SIGNIFICANT CHANGE UP (ref 5–8)
PH UR: 5.5 — SIGNIFICANT CHANGE UP (ref 5–8)
PH UR: 6 — SIGNIFICANT CHANGE UP (ref 5–8)
PH, PLEURAL FLUID: 7.47 — SIGNIFICANT CHANGE UP
PHOSPHATE SERPL-MCNC: 2.6 MG/DL — SIGNIFICANT CHANGE UP (ref 2.5–4.5)
PHOSPHATE SERPL-MCNC: 2.7 MG/DL — SIGNIFICANT CHANGE UP (ref 2.5–4.5)
PHOSPHATE SERPL-MCNC: 2.9 MG/DL — SIGNIFICANT CHANGE UP (ref 2.5–4.5)
PHOSPHATE SERPL-MCNC: 4.4 MG/DL — SIGNIFICANT CHANGE UP (ref 2.5–4.5)
PLAT MORPH BLD: ABNORMAL
PLAT MORPH BLD: NORMAL — SIGNIFICANT CHANGE UP
PLATELET # BLD AUTO: 345 K/UL — SIGNIFICANT CHANGE UP (ref 150–400)
PLATELET # BLD AUTO: 387 K/UL — SIGNIFICANT CHANGE UP (ref 150–400)
PLATELET # BLD AUTO: 396 K/UL — SIGNIFICANT CHANGE UP (ref 150–400)
PLATELET # BLD AUTO: 405 K/UL — HIGH (ref 150–400)
PLATELET # BLD AUTO: 423 K/UL — HIGH (ref 150–400)
PLATELET # BLD AUTO: 500 K/UL — HIGH (ref 150–400)
PLATELET # BLD AUTO: 517 K/UL — HIGH (ref 150–400)
PO2 BLDV: 48 MMHG — HIGH (ref 25–45)
PO2 BLDV: <29 MMHG — LOW (ref 25–45)
POIKILOCYTOSIS BLD QL AUTO: SLIGHT — SIGNIFICANT CHANGE UP
POIKILOCYTOSIS BLD QL AUTO: SLIGHT — SIGNIFICANT CHANGE UP
POLYCHROMASIA BLD QL SMEAR: SLIGHT — SIGNIFICANT CHANGE UP
POLYCHROMASIA BLD QL SMEAR: SLIGHT — SIGNIFICANT CHANGE UP
POTASSIUM BLDV-SCNC: 4.1 MMOL/L — SIGNIFICANT CHANGE UP (ref 3.5–5.1)
POTASSIUM BLDV-SCNC: 4.5 MMOL/L — SIGNIFICANT CHANGE UP (ref 3.5–5.1)
POTASSIUM SERPL-MCNC: 4.2 MMOL/L — SIGNIFICANT CHANGE UP (ref 3.5–5.3)
POTASSIUM SERPL-MCNC: 4.4 MMOL/L — SIGNIFICANT CHANGE UP (ref 3.5–5.3)
POTASSIUM SERPL-MCNC: 4.5 MMOL/L — SIGNIFICANT CHANGE UP (ref 3.5–5.3)
POTASSIUM SERPL-MCNC: 4.6 MMOL/L — SIGNIFICANT CHANGE UP (ref 3.5–5.3)
POTASSIUM SERPL-MCNC: 4.6 MMOL/L — SIGNIFICANT CHANGE UP (ref 3.5–5.3)
POTASSIUM SERPL-SCNC: 4.2 MMOL/L — SIGNIFICANT CHANGE UP (ref 3.5–5.3)
POTASSIUM SERPL-SCNC: 4.4 MMOL/L — SIGNIFICANT CHANGE UP (ref 3.5–5.3)
POTASSIUM SERPL-SCNC: 4.5 MMOL/L — SIGNIFICANT CHANGE UP (ref 3.5–5.3)
POTASSIUM SERPL-SCNC: 4.6 MMOL/L — SIGNIFICANT CHANGE UP (ref 3.5–5.3)
POTASSIUM SERPL-SCNC: 4.6 MMOL/L — SIGNIFICANT CHANGE UP (ref 3.5–5.3)
PROT FLD-MCNC: 3.9 G/DL — SIGNIFICANT CHANGE UP
PROT SERPL-MCNC: 6 G/DL — SIGNIFICANT CHANGE UP (ref 6–8.3)
PROT SERPL-MCNC: 6.1 G/DL — SIGNIFICANT CHANGE UP (ref 6–8.3)
PROT SERPL-MCNC: 7 G/DL — SIGNIFICANT CHANGE UP (ref 6–8.3)
PROT UR-MCNC: ABNORMAL MG/DL
PROT UR-MCNC: NEGATIVE MG/DL — SIGNIFICANT CHANGE UP
PROT UR-MCNC: NEGATIVE MG/DL — SIGNIFICANT CHANGE UP
PROTHROM AB SERPL-ACNC: 13 SEC — SIGNIFICANT CHANGE UP (ref 10.5–13.4)
PROTHROM AB SERPL-ACNC: 13.6 SEC — HIGH (ref 10.5–13.4)
RAPID RVP RESULT: SIGNIFICANT CHANGE UP
RBC # BLD: 3.43 M/UL — LOW (ref 4.2–5.8)
RBC # BLD: 3.47 M/UL — LOW (ref 4.2–5.8)
RBC # BLD: 3.54 M/UL — LOW (ref 4.2–5.8)
RBC # BLD: 3.68 M/UL — LOW (ref 4.2–5.8)
RBC # BLD: 4.09 M/UL — LOW (ref 4.2–5.8)
RBC # BLD: 4.23 M/UL — SIGNIFICANT CHANGE UP (ref 4.2–5.8)
RBC # BLD: 4.28 M/UL — SIGNIFICANT CHANGE UP (ref 4.2–5.8)
RBC # FLD: 14.3 % — SIGNIFICANT CHANGE UP (ref 10.3–14.5)
RBC # FLD: 14.4 % — SIGNIFICANT CHANGE UP (ref 10.3–14.5)
RBC # FLD: 14.4 % — SIGNIFICANT CHANGE UP (ref 10.3–14.5)
RBC # FLD: 14.7 % — HIGH (ref 10.3–14.5)
RBC # FLD: 14.8 % — HIGH (ref 10.3–14.5)
RBC # FLD: 15 % — HIGH (ref 10.3–14.5)
RBC # FLD: SIGNIFICANT CHANGE UP (ref 10.3–14.5)
RBC BLD AUTO: ABNORMAL
RBC BLD AUTO: ABNORMAL
RBC CASTS # UR COMP ASSIST: < 5 /HPF — SIGNIFICANT CHANGE UP
RCV VOL RI: 2000 /UL — HIGH (ref 0–0)
S PNEUM AG UR QL: NEGATIVE — SIGNIFICANT CHANGE UP
SAO2 % BLDV: 26.8 % — LOW (ref 67–88)
SAO2 % BLDV: 77.5 % — SIGNIFICANT CHANGE UP (ref 67–88)
SARS-COV-2 RNA SPEC QL NAA+PROBE: NEGATIVE — SIGNIFICANT CHANGE UP
SARS-COV-2 RNA SPEC QL NAA+PROBE: SIGNIFICANT CHANGE UP
SODIUM SERPL-SCNC: 132 MMOL/L — LOW (ref 135–145)
SODIUM SERPL-SCNC: 132 MMOL/L — LOW (ref 135–145)
SODIUM SERPL-SCNC: 136 MMOL/L — SIGNIFICANT CHANGE UP (ref 135–145)
SODIUM SERPL-SCNC: 136 MMOL/L — SIGNIFICANT CHANGE UP (ref 135–145)
SODIUM SERPL-SCNC: 137 MMOL/L — SIGNIFICANT CHANGE UP (ref 135–145)
SODIUM SERPL-SCNC: 137 MMOL/L — SIGNIFICANT CHANGE UP (ref 135–145)
SODIUM SERPL-SCNC: 138 MMOL/L — SIGNIFICANT CHANGE UP (ref 135–145)
SP GR SPEC: 1.01 — SIGNIFICANT CHANGE UP (ref 1–1.03)
SP GR SPEC: 1.01 — SIGNIFICANT CHANGE UP (ref 1–1.03)
SP GR SPEC: 1.02 — SIGNIFICANT CHANGE UP (ref 1–1.03)
SP GR SPEC: 1.02 — SIGNIFICANT CHANGE UP (ref 1–1.03)
SP GR SPEC: >=1.03 — SIGNIFICANT CHANGE UP (ref 1–1.03)
SPECIMEN SOURCE FLD: SIGNIFICANT CHANGE UP
SPECIMEN SOURCE: SIGNIFICANT CHANGE UP
TOTAL NUCLEATED CELL COUNT, BODY FLUID: 1065 /UL — SIGNIFICANT CHANGE UP
TROPONIN T SERPL-MCNC: <0.01 NG/ML — SIGNIFICANT CHANGE UP (ref 0–0.01)
TROPONIN T SERPL-MCNC: <0.01 NG/ML — SIGNIFICANT CHANGE UP (ref 0–0.01)
TUBE TYPE: SIGNIFICANT CHANGE UP
UROBILINOGEN FLD QL: 0.2 E.U./DL — SIGNIFICANT CHANGE UP
UROBILINOGEN FLD QL: 0.2 E.U./DL — SIGNIFICANT CHANGE UP
UROBILINOGEN FLD QL: 1 E.U./DL — SIGNIFICANT CHANGE UP
WBC # BLD: 15.49 K/UL — HIGH (ref 3.8–10.5)
WBC # BLD: 6.89 K/UL — SIGNIFICANT CHANGE UP (ref 3.8–10.5)
WBC # BLD: 7.13 K/UL — SIGNIFICANT CHANGE UP (ref 3.8–10.5)
WBC # BLD: 7.29 K/UL — SIGNIFICANT CHANGE UP (ref 3.8–10.5)
WBC # BLD: 7.36 K/UL — SIGNIFICANT CHANGE UP (ref 3.8–10.5)
WBC # BLD: 7.52 K/UL — SIGNIFICANT CHANGE UP (ref 3.8–10.5)
WBC # BLD: 9.39 K/UL — SIGNIFICANT CHANGE UP (ref 3.8–10.5)
WBC # FLD AUTO: 15.49 K/UL — HIGH (ref 3.8–10.5)
WBC # FLD AUTO: 6.89 K/UL — SIGNIFICANT CHANGE UP (ref 3.8–10.5)
WBC # FLD AUTO: 7.13 K/UL — SIGNIFICANT CHANGE UP (ref 3.8–10.5)
WBC # FLD AUTO: 7.29 K/UL — SIGNIFICANT CHANGE UP (ref 3.8–10.5)
WBC # FLD AUTO: 7.36 K/UL — SIGNIFICANT CHANGE UP (ref 3.8–10.5)
WBC # FLD AUTO: 7.52 K/UL — SIGNIFICANT CHANGE UP (ref 3.8–10.5)
WBC # FLD AUTO: 9.39 K/UL — SIGNIFICANT CHANGE UP (ref 3.8–10.5)
WBC UR QL: < 5 /HPF — SIGNIFICANT CHANGE UP

## 2022-01-01 PROCEDURE — 84157 ASSAY OF PROTEIN OTHER: CPT

## 2022-01-01 PROCEDURE — 76604 US EXAM CHEST: CPT | Mod: 26,GC

## 2022-01-01 PROCEDURE — 87086 URINE CULTURE/COLONY COUNT: CPT

## 2022-01-01 PROCEDURE — 97161 PT EVAL LOW COMPLEX 20 MIN: CPT

## 2022-01-01 PROCEDURE — 71045 X-RAY EXAM CHEST 1 VIEW: CPT | Mod: 26

## 2022-01-01 PROCEDURE — 83986 ASSAY PH BODY FLUID NOS: CPT

## 2022-01-01 PROCEDURE — 88342 IMHCHEM/IMCYTCHM 1ST ANTB: CPT | Mod: 26

## 2022-01-01 PROCEDURE — 83605 ASSAY OF LACTIC ACID: CPT

## 2022-01-01 PROCEDURE — 93005 ELECTROCARDIOGRAM TRACING: CPT

## 2022-01-01 PROCEDURE — 99233 SBSQ HOSP IP/OBS HIGH 50: CPT | Mod: GC,25

## 2022-01-01 PROCEDURE — 85610 PROTHROMBIN TIME: CPT

## 2022-01-01 PROCEDURE — 85025 COMPLETE CBC W/AUTO DIFF WBC: CPT

## 2022-01-01 PROCEDURE — 82042 OTHER SOURCE ALBUMIN QUAN EA: CPT

## 2022-01-01 PROCEDURE — 76700 US EXAM ABDOM COMPLETE: CPT

## 2022-01-01 PROCEDURE — 99232 SBSQ HOSP IP/OBS MODERATE 35: CPT

## 2022-01-01 PROCEDURE — 99285 EMERGENCY DEPT VISIT HI MDM: CPT

## 2022-01-01 PROCEDURE — 80048 BASIC METABOLIC PNL TOTAL CA: CPT

## 2022-01-01 PROCEDURE — 32551 INSERTION OF CHEST TUBE: CPT | Mod: GC

## 2022-01-01 PROCEDURE — 88305 TISSUE EXAM BY PATHOLOGIST: CPT

## 2022-01-01 PROCEDURE — 87040 BLOOD CULTURE FOR BACTERIA: CPT

## 2022-01-01 PROCEDURE — 82962 GLUCOSE BLOOD TEST: CPT

## 2022-01-01 PROCEDURE — 84311 SPECTROPHOTOMETRY: CPT

## 2022-01-01 PROCEDURE — 84100 ASSAY OF PHOSPHORUS: CPT

## 2022-01-01 PROCEDURE — 74177 CT ABD & PELVIS W/CONTRAST: CPT | Mod: MA

## 2022-01-01 PROCEDURE — 82330 ASSAY OF CALCIUM: CPT

## 2022-01-01 PROCEDURE — 84132 ASSAY OF SERUM POTASSIUM: CPT

## 2022-01-01 PROCEDURE — 82945 GLUCOSE OTHER FLUID: CPT

## 2022-01-01 PROCEDURE — 83615 LACTATE (LD) (LDH) ENZYME: CPT

## 2022-01-01 PROCEDURE — 96375 TX/PRO/DX INJ NEW DRUG ADDON: CPT

## 2022-01-01 PROCEDURE — 85730 THROMBOPLASTIN TIME PARTIAL: CPT

## 2022-01-01 PROCEDURE — 87075 CULTR BACTERIA EXCEPT BLOOD: CPT

## 2022-01-01 PROCEDURE — 76700 US EXAM ABDOM COMPLETE: CPT | Mod: 26

## 2022-01-01 PROCEDURE — 94640 AIRWAY INHALATION TREATMENT: CPT

## 2022-01-01 PROCEDURE — 84484 ASSAY OF TROPONIN QUANT: CPT

## 2022-01-01 PROCEDURE — 74019 RADEX ABDOMEN 2 VIEWS: CPT | Mod: 26

## 2022-01-01 PROCEDURE — 96374 THER/PROPH/DIAG INJ IV PUSH: CPT

## 2022-01-01 PROCEDURE — 99232 SBSQ HOSP IP/OBS MODERATE 35: CPT | Mod: GC

## 2022-01-01 PROCEDURE — 93010 ELECTROCARDIOGRAM REPORT: CPT

## 2022-01-01 PROCEDURE — 83036 HEMOGLOBIN GLYCOSYLATED A1C: CPT

## 2022-01-01 PROCEDURE — 71250 CT THORAX DX C-: CPT | Mod: MA

## 2022-01-01 PROCEDURE — 99233 SBSQ HOSP IP/OBS HIGH 50: CPT | Mod: GC

## 2022-01-01 PROCEDURE — 87899 AGENT NOS ASSAY W/OPTIC: CPT

## 2022-01-01 PROCEDURE — 99223 1ST HOSP IP/OBS HIGH 75: CPT

## 2022-01-01 PROCEDURE — 99223 1ST HOSP IP/OBS HIGH 75: CPT | Mod: GC

## 2022-01-01 PROCEDURE — 81001 URINALYSIS AUTO W/SCOPE: CPT

## 2022-01-01 PROCEDURE — 97110 THERAPEUTIC EXERCISES: CPT

## 2022-01-01 PROCEDURE — 99239 HOSP IP/OBS DSCHRG MGMT >30: CPT | Mod: GC

## 2022-01-01 PROCEDURE — 87070 CULTURE OTHR SPECIMN AEROBIC: CPT

## 2022-01-01 PROCEDURE — 32555 ASPIRATE PLEURA W/ IMAGING: CPT | Mod: GC

## 2022-01-01 PROCEDURE — 97116 GAIT TRAINING THERAPY: CPT

## 2022-01-01 PROCEDURE — 88341 IMHCHEM/IMCYTCHM EA ADD ANTB: CPT | Mod: 26

## 2022-01-01 PROCEDURE — 83735 ASSAY OF MAGNESIUM: CPT

## 2022-01-01 PROCEDURE — 71045 X-RAY EXAM CHEST 1 VIEW: CPT

## 2022-01-01 PROCEDURE — 84145 PROCALCITONIN (PCT): CPT

## 2022-01-01 PROCEDURE — 71250 CT THORAX DX C-: CPT | Mod: 26,MA

## 2022-01-01 PROCEDURE — 36415 COLL VENOUS BLD VENIPUNCTURE: CPT

## 2022-01-01 PROCEDURE — 85027 COMPLETE CBC AUTOMATED: CPT

## 2022-01-01 PROCEDURE — 80053 COMPREHEN METABOLIC PANEL: CPT

## 2022-01-01 PROCEDURE — 82803 BLOOD GASES ANY COMBINATION: CPT

## 2022-01-01 PROCEDURE — 71045 X-RAY EXAM CHEST 1 VIEW: CPT | Mod: 26,77

## 2022-01-01 PROCEDURE — 87449 NOS EACH ORGANISM AG IA: CPT

## 2022-01-01 PROCEDURE — 84295 ASSAY OF SERUM SODIUM: CPT

## 2022-01-01 PROCEDURE — 88341 IMHCHEM/IMCYTCHM EA ADD ANTB: CPT

## 2022-01-01 PROCEDURE — 88112 CYTOPATH CELL ENHANCE TECH: CPT | Mod: 26

## 2022-01-01 PROCEDURE — 88305 TISSUE EXAM BY PATHOLOGIST: CPT | Mod: 26

## 2022-01-01 PROCEDURE — 88112 CYTOPATH CELL ENHANCE TECH: CPT

## 2022-01-01 PROCEDURE — 99285 EMERGENCY DEPT VISIT HI MDM: CPT | Mod: 25

## 2022-01-01 PROCEDURE — 87635 SARS-COV-2 COVID-19 AMP PRB: CPT

## 2022-01-01 PROCEDURE — 0225U NFCT DS DNA&RNA 21 SARSCOV2: CPT

## 2022-01-01 PROCEDURE — 81003 URINALYSIS AUTO W/O SCOPE: CPT

## 2022-01-01 PROCEDURE — 87205 SMEAR GRAM STAIN: CPT

## 2022-01-01 PROCEDURE — 74019 RADEX ABDOMEN 2 VIEWS: CPT

## 2022-01-01 PROCEDURE — 74177 CT ABD & PELVIS W/CONTRAST: CPT | Mod: 26,MA

## 2022-01-01 PROCEDURE — 89051 BODY FLUID CELL COUNT: CPT

## 2022-01-01 RX ORDER — ASPIRIN/CALCIUM CARB/MAGNESIUM 324 MG
1 TABLET ORAL
Qty: 0 | Refills: 0 | DISCHARGE

## 2022-01-01 RX ORDER — AZITHROMYCIN 500 MG/1
500 TABLET, FILM COATED ORAL EVERY 24 HOURS
Refills: 0 | Status: DISCONTINUED | OUTPATIENT
Start: 2022-01-01 | End: 2022-01-01

## 2022-01-01 RX ORDER — MAGNESIUM SULFATE 500 MG/ML
2 VIAL (ML) INJECTION ONCE
Refills: 0 | Status: COMPLETED | OUTPATIENT
Start: 2022-01-01 | End: 2022-01-01

## 2022-01-01 RX ORDER — ALBUTEROL 90 UG/1
2 AEROSOL, METERED ORAL ONCE
Refills: 0 | Status: COMPLETED | OUTPATIENT
Start: 2022-01-01 | End: 2022-01-01

## 2022-01-01 RX ORDER — DEXTROSE 50 % IN WATER 50 %
15 SYRINGE (ML) INTRAVENOUS ONCE
Refills: 0 | Status: DISCONTINUED | OUTPATIENT
Start: 2022-01-01 | End: 2022-01-01

## 2022-01-01 RX ORDER — SODIUM CHLORIDE 9 MG/ML
1000 INJECTION, SOLUTION INTRAVENOUS
Refills: 0 | Status: DISCONTINUED | OUTPATIENT
Start: 2022-01-01 | End: 2022-01-01

## 2022-01-01 RX ORDER — AMLODIPINE BESYLATE 2.5 MG/1
1 TABLET ORAL
Qty: 0 | Refills: 0 | DISCHARGE

## 2022-01-01 RX ORDER — AZITHROMYCIN 500 MG/1
500 TABLET, FILM COATED ORAL ONCE
Refills: 0 | Status: COMPLETED | OUTPATIENT
Start: 2022-01-01 | End: 2022-01-01

## 2022-01-01 RX ORDER — MORPHINE SULFATE 50 MG/1
2 CAPSULE, EXTENDED RELEASE ORAL ONCE
Refills: 0 | Status: DISCONTINUED | OUTPATIENT
Start: 2022-01-01 | End: 2022-01-01

## 2022-01-01 RX ORDER — SODIUM CHLORIDE 9 MG/ML
500 INJECTION INTRAMUSCULAR; INTRAVENOUS; SUBCUTANEOUS ONCE
Refills: 0 | Status: COMPLETED | OUTPATIENT
Start: 2022-01-01 | End: 2022-01-01

## 2022-01-01 RX ORDER — LIDOCAINE HCL 20 MG/ML
5 VIAL (ML) INJECTION ONCE
Refills: 0 | Status: COMPLETED | OUTPATIENT
Start: 2022-01-01 | End: 2022-01-01

## 2022-01-01 RX ORDER — ACETAMINOPHEN 500 MG
1000 TABLET ORAL ONCE
Refills: 0 | Status: COMPLETED | OUTPATIENT
Start: 2022-01-01 | End: 2022-01-01

## 2022-01-01 RX ORDER — MORPHINE SULFATE 50 MG/1
5 CAPSULE, EXTENDED RELEASE ORAL ONCE
Refills: 0 | Status: DISCONTINUED | OUTPATIENT
Start: 2022-01-01 | End: 2022-01-01

## 2022-01-01 RX ORDER — INSULIN LISPRO 100/ML
VIAL (ML) SUBCUTANEOUS
Refills: 0 | Status: DISCONTINUED | OUTPATIENT
Start: 2022-01-01 | End: 2022-01-01

## 2022-01-01 RX ORDER — PIPERACILLIN AND TAZOBACTAM 4; .5 G/20ML; G/20ML
3.38 INJECTION, POWDER, LYOPHILIZED, FOR SOLUTION INTRAVENOUS ONCE
Refills: 0 | Status: COMPLETED | OUTPATIENT
Start: 2022-01-01 | End: 2022-01-01

## 2022-01-01 RX ORDER — ACETAMINOPHEN 500 MG
650 TABLET ORAL ONCE
Refills: 0 | Status: COMPLETED | OUTPATIENT
Start: 2022-01-01 | End: 2022-01-01

## 2022-01-01 RX ORDER — SODIUM CHLORIDE 9 MG/ML
1000 INJECTION INTRAMUSCULAR; INTRAVENOUS; SUBCUTANEOUS ONCE
Refills: 0 | Status: COMPLETED | OUTPATIENT
Start: 2022-01-01 | End: 2022-01-01

## 2022-01-01 RX ORDER — LIDOCAINE HCL 20 MG/ML
20 VIAL (ML) INJECTION ONCE
Refills: 0 | Status: COMPLETED | OUTPATIENT
Start: 2022-01-01 | End: 2022-01-01

## 2022-01-01 RX ORDER — DEXTROSE 50 % IN WATER 50 %
25 SYRINGE (ML) INTRAVENOUS ONCE
Refills: 0 | Status: DISCONTINUED | OUTPATIENT
Start: 2022-01-01 | End: 2022-01-01

## 2022-01-01 RX ORDER — TAMSULOSIN HYDROCHLORIDE 0.4 MG/1
0.4 CAPSULE ORAL AT BEDTIME
Refills: 0 | Status: DISCONTINUED | OUTPATIENT
Start: 2022-01-01 | End: 2022-01-01

## 2022-01-01 RX ORDER — ACETAMINOPHEN 500 MG
650 TABLET ORAL EVERY 6 HOURS
Refills: 0 | Status: DISCONTINUED | OUTPATIENT
Start: 2022-01-01 | End: 2022-01-01

## 2022-01-01 RX ORDER — GLUCAGON INJECTION, SOLUTION 0.5 MG/.1ML
1 INJECTION, SOLUTION SUBCUTANEOUS ONCE
Refills: 0 | Status: DISCONTINUED | OUTPATIENT
Start: 2022-01-01 | End: 2022-01-01

## 2022-01-01 RX ORDER — TAMSULOSIN HYDROCHLORIDE 0.4 MG/1
1 CAPSULE ORAL
Qty: 0 | Refills: 0 | DISCHARGE

## 2022-01-01 RX ORDER — CEFTRIAXONE 500 MG/1
1000 INJECTION, POWDER, FOR SOLUTION INTRAMUSCULAR; INTRAVENOUS ONCE
Refills: 0 | Status: COMPLETED | OUTPATIENT
Start: 2022-01-01 | End: 2022-01-01

## 2022-01-01 RX ORDER — ONDANSETRON 8 MG/1
4 TABLET, FILM COATED ORAL ONCE
Refills: 0 | Status: COMPLETED | OUTPATIENT
Start: 2022-01-01 | End: 2022-01-01

## 2022-01-01 RX ORDER — VANCOMYCIN HCL 1 G
1000 VIAL (EA) INTRAVENOUS EVERY 24 HOURS
Refills: 0 | Status: DISCONTINUED | OUTPATIENT
Start: 2022-01-01 | End: 2022-01-01

## 2022-01-01 RX ORDER — MORPHINE SULFATE 50 MG/1
4 CAPSULE, EXTENDED RELEASE ORAL ONCE
Refills: 0 | Status: DISCONTINUED | OUTPATIENT
Start: 2022-01-01 | End: 2022-01-01

## 2022-01-01 RX ORDER — ENOXAPARIN SODIUM 100 MG/ML
40 INJECTION SUBCUTANEOUS EVERY 24 HOURS
Refills: 0 | Status: DISCONTINUED | OUTPATIENT
Start: 2022-01-01 | End: 2022-01-01

## 2022-01-01 RX ORDER — DEXTROSE 50 % IN WATER 50 %
12.5 SYRINGE (ML) INTRAVENOUS ONCE
Refills: 0 | Status: DISCONTINUED | OUTPATIENT
Start: 2022-01-01 | End: 2022-01-01

## 2022-01-01 RX ORDER — ATORVASTATIN CALCIUM 80 MG/1
10 TABLET, FILM COATED ORAL AT BEDTIME
Refills: 0 | Status: DISCONTINUED | OUTPATIENT
Start: 2022-01-01 | End: 2022-01-01

## 2022-01-01 RX ORDER — SITAGLIPTIN 50 MG/1
1 TABLET, FILM COATED ORAL
Qty: 0 | Refills: 0 | DISCHARGE

## 2022-01-01 RX ORDER — PIPERACILLIN AND TAZOBACTAM 4; .5 G/20ML; G/20ML
3.38 INJECTION, POWDER, LYOPHILIZED, FOR SOLUTION INTRAVENOUS EVERY 6 HOURS
Refills: 0 | Status: DISCONTINUED | OUTPATIENT
Start: 2022-01-01 | End: 2022-01-01

## 2022-01-01 RX ORDER — CEFTRIAXONE 500 MG/1
1000 INJECTION, POWDER, FOR SOLUTION INTRAMUSCULAR; INTRAVENOUS EVERY 24 HOURS
Refills: 0 | Status: COMPLETED | OUTPATIENT
Start: 2022-01-01 | End: 2022-01-01

## 2022-01-01 RX ORDER — MIRTAZAPINE 45 MG/1
15 TABLET, ORALLY DISINTEGRATING ORAL AT BEDTIME
Refills: 0 | Status: DISCONTINUED | OUTPATIENT
Start: 2022-01-01 | End: 2022-01-01

## 2022-01-01 RX ORDER — POLYETHYLENE GLYCOL 3350 17 G/17G
17 POWDER, FOR SOLUTION ORAL DAILY
Refills: 0 | Status: DISCONTINUED | OUTPATIENT
Start: 2022-01-01 | End: 2022-01-01

## 2022-01-01 RX ORDER — SODIUM CHLORIDE 9 MG/ML
2000 INJECTION INTRAMUSCULAR; INTRAVENOUS; SUBCUTANEOUS ONCE
Refills: 0 | Status: COMPLETED | OUTPATIENT
Start: 2022-01-01 | End: 2022-01-01

## 2022-01-01 RX ORDER — HEPARIN SODIUM 5000 [USP'U]/ML
5000 INJECTION INTRAVENOUS; SUBCUTANEOUS EVERY 8 HOURS
Refills: 0 | Status: DISCONTINUED | OUTPATIENT
Start: 2022-01-01 | End: 2022-01-01

## 2022-01-01 RX ORDER — SENNA PLUS 8.6 MG/1
2 TABLET ORAL AT BEDTIME
Refills: 0 | Status: DISCONTINUED | OUTPATIENT
Start: 2022-01-01 | End: 2022-01-01

## 2022-01-01 RX ORDER — VANCOMYCIN HCL 1 G
1000 VIAL (EA) INTRAVENOUS ONCE
Refills: 0 | Status: COMPLETED | OUTPATIENT
Start: 2022-01-01 | End: 2022-01-01

## 2022-01-01 RX ORDER — OXYCODONE AND ACETAMINOPHEN 5; 325 MG/1; MG/1
1 TABLET ORAL ONCE
Refills: 0 | Status: DISCONTINUED | OUTPATIENT
Start: 2022-01-01 | End: 2022-01-01

## 2022-01-01 RX ORDER — MORPHINE SULFATE 50 MG/1
1 CAPSULE, EXTENDED RELEASE ORAL EVERY 4 HOURS
Refills: 0 | Status: DISCONTINUED | OUTPATIENT
Start: 2022-01-01 | End: 2022-01-01

## 2022-01-01 RX ORDER — ONDANSETRON 8 MG/1
4 TABLET, FILM COATED ORAL EVERY 6 HOURS
Refills: 0 | Status: DISCONTINUED | OUTPATIENT
Start: 2022-01-01 | End: 2022-01-01

## 2022-01-01 RX ORDER — LISINOPRIL 2.5 MG/1
1 TABLET ORAL
Qty: 0 | Refills: 0 | DISCHARGE

## 2022-01-01 RX ORDER — MAGNESIUM SULFATE 500 MG/ML
1 VIAL (ML) INJECTION ONCE
Refills: 0 | Status: COMPLETED | OUTPATIENT
Start: 2022-01-01 | End: 2022-01-01

## 2022-01-01 RX ORDER — MIRTAZAPINE 45 MG/1
1 TABLET, ORALLY DISINTEGRATING ORAL
Qty: 0 | Refills: 0 | DISCHARGE

## 2022-01-01 RX ADMIN — MORPHINE SULFATE 1 MILLIGRAM(S): 50 CAPSULE, EXTENDED RELEASE ORAL at 00:10

## 2022-01-01 RX ADMIN — HEPARIN SODIUM 5000 UNIT(S): 5000 INJECTION INTRAVENOUS; SUBCUTANEOUS at 13:34

## 2022-01-01 RX ADMIN — MIRTAZAPINE 15 MILLIGRAM(S): 45 TABLET, ORALLY DISINTEGRATING ORAL at 22:53

## 2022-01-01 RX ADMIN — MIRTAZAPINE 15 MILLIGRAM(S): 45 TABLET, ORALLY DISINTEGRATING ORAL at 22:45

## 2022-01-01 RX ADMIN — MIRTAZAPINE 15 MILLIGRAM(S): 45 TABLET, ORALLY DISINTEGRATING ORAL at 22:36

## 2022-01-01 RX ADMIN — PIPERACILLIN AND TAZOBACTAM 200 GRAM(S): 4; .5 INJECTION, POWDER, LYOPHILIZED, FOR SOLUTION INTRAVENOUS at 16:35

## 2022-01-01 RX ADMIN — Medication 2: at 17:28

## 2022-01-01 RX ADMIN — Medication 4: at 12:17

## 2022-01-01 RX ADMIN — PIPERACILLIN AND TAZOBACTAM 200 GRAM(S): 4; .5 INJECTION, POWDER, LYOPHILIZED, FOR SOLUTION INTRAVENOUS at 23:21

## 2022-01-01 RX ADMIN — ALBUTEROL 2 PUFF(S): 90 AEROSOL, METERED ORAL at 11:38

## 2022-01-01 RX ADMIN — Medication 650 MILLIGRAM(S): at 18:33

## 2022-01-01 RX ADMIN — CEFTRIAXONE 100 MILLIGRAM(S): 500 INJECTION, POWDER, FOR SOLUTION INTRAMUSCULAR; INTRAVENOUS at 11:24

## 2022-01-01 RX ADMIN — Medication 6: at 12:56

## 2022-01-01 RX ADMIN — Medication 2: at 12:59

## 2022-01-01 RX ADMIN — Medication 2: at 18:21

## 2022-01-01 RX ADMIN — TAMSULOSIN HYDROCHLORIDE 0.4 MILLIGRAM(S): 0.4 CAPSULE ORAL at 22:53

## 2022-01-01 RX ADMIN — HEPARIN SODIUM 5000 UNIT(S): 5000 INJECTION INTRAVENOUS; SUBCUTANEOUS at 16:01

## 2022-01-01 RX ADMIN — HEPARIN SODIUM 5000 UNIT(S): 5000 INJECTION INTRAVENOUS; SUBCUTANEOUS at 06:33

## 2022-01-01 RX ADMIN — Medication 2: at 22:45

## 2022-01-01 RX ADMIN — Medication 2: at 12:25

## 2022-01-01 RX ADMIN — Medication 1000 MILLIGRAM(S): at 23:40

## 2022-01-01 RX ADMIN — Medication 4: at 12:30

## 2022-01-01 RX ADMIN — Medication 400 MILLIGRAM(S): at 12:20

## 2022-01-01 RX ADMIN — HEPARIN SODIUM 5000 UNIT(S): 5000 INJECTION INTRAVENOUS; SUBCUTANEOUS at 06:30

## 2022-01-01 RX ADMIN — OXYCODONE AND ACETAMINOPHEN 1 TABLET(S): 5; 325 TABLET ORAL at 17:29

## 2022-01-01 RX ADMIN — Medication 6: at 18:01

## 2022-01-01 RX ADMIN — CEFTRIAXONE 100 MILLIGRAM(S): 500 INJECTION, POWDER, FOR SOLUTION INTRAMUSCULAR; INTRAVENOUS at 12:05

## 2022-01-01 RX ADMIN — Medication 650 MILLIGRAM(S): at 14:31

## 2022-01-01 RX ADMIN — HEPARIN SODIUM 5000 UNIT(S): 5000 INJECTION INTRAVENOUS; SUBCUTANEOUS at 22:45

## 2022-01-01 RX ADMIN — Medication 5 MILLILITER(S): at 08:27

## 2022-01-01 RX ADMIN — ONDANSETRON 4 MILLIGRAM(S): 8 TABLET, FILM COATED ORAL at 11:46

## 2022-01-01 RX ADMIN — PIPERACILLIN AND TAZOBACTAM 200 GRAM(S): 4; .5 INJECTION, POWDER, LYOPHILIZED, FOR SOLUTION INTRAVENOUS at 04:46

## 2022-01-01 RX ADMIN — TAMSULOSIN HYDROCHLORIDE 0.4 MILLIGRAM(S): 0.4 CAPSULE ORAL at 22:45

## 2022-01-01 RX ADMIN — Medication 250 MILLIGRAM(S): at 14:31

## 2022-01-01 RX ADMIN — Medication 4: at 17:23

## 2022-01-01 RX ADMIN — AZITHROMYCIN 255 MILLIGRAM(S): 500 TABLET, FILM COATED ORAL at 12:53

## 2022-01-01 RX ADMIN — MORPHINE SULFATE 4 MILLIGRAM(S): 50 CAPSULE, EXTENDED RELEASE ORAL at 18:26

## 2022-01-01 RX ADMIN — CEFTRIAXONE 100 MILLIGRAM(S): 500 INJECTION, POWDER, FOR SOLUTION INTRAMUSCULAR; INTRAVENOUS at 11:12

## 2022-01-01 RX ADMIN — MORPHINE SULFATE 1 MILLIGRAM(S): 50 CAPSULE, EXTENDED RELEASE ORAL at 00:34

## 2022-01-01 RX ADMIN — SODIUM CHLORIDE 1000 MILLILITER(S): 9 INJECTION INTRAMUSCULAR; INTRAVENOUS; SUBCUTANEOUS at 12:22

## 2022-01-01 RX ADMIN — Medication 2: at 08:41

## 2022-01-01 RX ADMIN — PIPERACILLIN AND TAZOBACTAM 200 GRAM(S): 4; .5 INJECTION, POWDER, LYOPHILIZED, FOR SOLUTION INTRAVENOUS at 13:58

## 2022-01-01 RX ADMIN — OXYCODONE AND ACETAMINOPHEN 1 TABLET(S): 5; 325 TABLET ORAL at 18:40

## 2022-01-01 RX ADMIN — Medication 650 MILLIGRAM(S): at 16:53

## 2022-01-01 RX ADMIN — Medication 250 MILLIGRAM(S): at 13:40

## 2022-01-01 RX ADMIN — HEPARIN SODIUM 5000 UNIT(S): 5000 INJECTION INTRAVENOUS; SUBCUTANEOUS at 15:35

## 2022-01-01 RX ADMIN — ATORVASTATIN CALCIUM 10 MILLIGRAM(S): 80 TABLET, FILM COATED ORAL at 22:38

## 2022-01-01 RX ADMIN — MIRTAZAPINE 15 MILLIGRAM(S): 45 TABLET, ORALLY DISINTEGRATING ORAL at 22:38

## 2022-01-01 RX ADMIN — HEPARIN SODIUM 5000 UNIT(S): 5000 INJECTION INTRAVENOUS; SUBCUTANEOUS at 07:07

## 2022-01-01 RX ADMIN — MORPHINE SULFATE 2 MILLIGRAM(S): 50 CAPSULE, EXTENDED RELEASE ORAL at 15:30

## 2022-01-01 RX ADMIN — HEPARIN SODIUM 5000 UNIT(S): 5000 INJECTION INTRAVENOUS; SUBCUTANEOUS at 22:38

## 2022-01-01 RX ADMIN — TAMSULOSIN HYDROCHLORIDE 0.4 MILLIGRAM(S): 0.4 CAPSULE ORAL at 22:38

## 2022-01-01 RX ADMIN — Medication 25 GRAM(S): at 11:13

## 2022-01-01 RX ADMIN — Medication 650 MILLIGRAM(S): at 11:40

## 2022-01-01 RX ADMIN — MORPHINE SULFATE 2 MILLIGRAM(S): 50 CAPSULE, EXTENDED RELEASE ORAL at 17:06

## 2022-01-01 RX ADMIN — HEPARIN SODIUM 5000 UNIT(S): 5000 INJECTION INTRAVENOUS; SUBCUTANEOUS at 22:53

## 2022-01-01 RX ADMIN — SODIUM CHLORIDE 1000 MILLILITER(S): 9 INJECTION INTRAMUSCULAR; INTRAVENOUS; SUBCUTANEOUS at 12:10

## 2022-01-01 RX ADMIN — HEPARIN SODIUM 5000 UNIT(S): 5000 INJECTION INTRAVENOUS; SUBCUTANEOUS at 22:35

## 2022-01-01 RX ADMIN — Medication 100 GRAM(S): at 09:31

## 2022-01-01 RX ADMIN — HEPARIN SODIUM 5000 UNIT(S): 5000 INJECTION INTRAVENOUS; SUBCUTANEOUS at 06:17

## 2022-01-01 RX ADMIN — CEFTRIAXONE 100 MILLIGRAM(S): 500 INJECTION, POWDER, FOR SOLUTION INTRAMUSCULAR; INTRAVENOUS at 12:21

## 2022-01-01 RX ADMIN — ATORVASTATIN CALCIUM 10 MILLIGRAM(S): 80 TABLET, FILM COATED ORAL at 22:53

## 2022-01-01 RX ADMIN — Medication 4: at 22:38

## 2022-01-01 RX ADMIN — Medication 2: at 09:01

## 2022-01-01 RX ADMIN — TAMSULOSIN HYDROCHLORIDE 0.4 MILLIGRAM(S): 0.4 CAPSULE ORAL at 22:36

## 2022-01-01 RX ADMIN — ATORVASTATIN CALCIUM 10 MILLIGRAM(S): 80 TABLET, FILM COATED ORAL at 22:36

## 2022-01-01 RX ADMIN — POLYETHYLENE GLYCOL 3350 17 GRAM(S): 17 POWDER, FOR SOLUTION ORAL at 09:32

## 2022-01-01 RX ADMIN — SODIUM CHLORIDE 2000 MILLILITER(S): 9 INJECTION INTRAMUSCULAR; INTRAVENOUS; SUBCUTANEOUS at 11:40

## 2022-01-01 RX ADMIN — HEPARIN SODIUM 5000 UNIT(S): 5000 INJECTION INTRAVENOUS; SUBCUTANEOUS at 15:34

## 2022-01-01 RX ADMIN — Medication 2: at 09:32

## 2022-01-01 RX ADMIN — Medication 400 MILLIGRAM(S): at 23:22

## 2022-01-01 RX ADMIN — PIPERACILLIN AND TAZOBACTAM 200 GRAM(S): 4; .5 INJECTION, POWDER, LYOPHILIZED, FOR SOLUTION INTRAVENOUS at 09:32

## 2022-01-01 RX ADMIN — SODIUM CHLORIDE 500 MILLILITER(S): 9 INJECTION INTRAMUSCULAR; INTRAVENOUS; SUBCUTANEOUS at 14:31

## 2022-01-01 RX ADMIN — Medication 20 MILLILITER(S): at 13:14

## 2022-01-01 RX ADMIN — Medication 2: at 22:35

## 2022-02-01 NOTE — ASU PATIENT PROFILE, ADULT - CAREGIVER ADDRESS
2/1/2022    Patient: Val Conti   YOB: 1951   Date of Visit: 2/1/2022     Nini Irene MD  [address] [facility] [address] [phone]  [address] [facility] [address]    Dear Nini Irene MD,      Thank you for referring Mr. Val Conti to [address] for evaluation. My notes for this consultation are attached. If you have questions, please do not hesitate to call me. I look forward to following your patient along with you.       Sincerely,    Suha Valdez MD /

## 2022-05-13 NOTE — ED ADULT NURSE NOTE - OBJECTIVE STATEMENT
Pt presents reporting cough for 1 month, episode of shortness of breath while trying to urinate this morning. pt denies any chest pain, SOB or fever.

## 2022-05-13 NOTE — ED ADULT TRIAGE NOTE - CHIEF COMPLAINT QUOTE
Pt present reporting cough for 1 month, episode of shortness of breath while trying to urinate this morning.

## 2022-05-13 NOTE — ED PROVIDER NOTE - NSFOLLOWUPINSTRUCTIONS_ED_ALL_ED_FT
Derrame pleural    Pleural Effusion      El derrame pleural es sayda acumulación anormal de líquido en las capas de tejido que se encuentran entre los pulmones y el interior del tórax (espacio pleural). Las dos capas de tejido que recubren los pulmones y el interior del tórax se llaman pleura. Generalmente, en el espacio de la pleura no hay aire, solo hay sayda clara capa de líquido. Algunas afecciones pueden causar que se acumule sayda gran cantidad de líquido y, si no se trata, brian acumulación puede provocar el colapso del pulmón. Por lo general, un derrame pleural se produce por otra enfermedad que requiere tratamiento.      ¿Cuáles son las causas?    Las causas del derrame pleural pueden ser las siguientes:  •Insuficiencia cardíaca.      •Ciertas infecciones, terell la neumonía o la tuberculosis.      •Cáncer.      •Un coágulo de yohannes en el pulmón (embolia pulmonar).      •Complicaciones de sayda cirugía, por ejemplo, cirugía a corazón abierto.      •Enfermedad hepática (cirrosis).      •Enfermedad renal.        ¿Cuáles son los signos o síntomas?    En algunos casos, el derrame pleural no produce síntomas. Si se presentan síntomas, estos pueden incluir los siguientes:  •Dificultad respiratoria, en especial al estar recostado.      •Dolor de pecho. Lindsey dolor puede empeorar al respirar profundo.      •Fiebre.      •Tos seca prolongada (crónica).      •Hipo.      •Respiración rápida.      La enfermedad subyacente que causa el derrame pleural (terell insuficiencia cardíaca, neumonía, coágulos de yohannes, tuberculosis o cáncer) también puede causar otros síntomas.      ¿Cómo se diagnostica?    Esta afección se puede diagnosticar en función de lo siguiente:  •Los síntomas y los antecedentes médicos.      •Un examen físico.      •Sayda radiografía de tórax.      •Un procedimiento en el que se usa sayda aguja para extraer líquido del espacio pleural (toracocentesis). Emmie líquido se analiza.      •Otros estudios de diagnóstico por imágenes del tórax, terell sayda ecografía o sayda exploración por tomografía computarizada (TC).        ¿Cómo se trata?     Dependiendo de la causa de la afección, el tratamiento puede incluir lo siguiente:•Tratar la afección subyacente que causa el derrame. Cuando la afección mejore, el derrame también mejorará. Algunos ejemplos de tratamiento para afecciones subyacentes pueden ser los siguientes:  •Antibióticos para tratar sayda infección.      •Diuréticos u otros medicamentos para el corazón para tratar la insuficiencia cardíaca.        •Toracocentesis.      •Colocación de un tubo walter y flexible debajo de la piel y en el interior del tórax para drenar continuamente el derrame (catéter pleural permanente).      •Cirugía para extirpar la capa externa de tejido del espacio pleural (pleurectomía).      •Un procedimiento que se utiliza para colocar medicamento en la cavidad torácica para sellar el espacio pleural a fin de evitar la acumulación de líquido (pleurodesis).      •Quimioterapia y radioterapia, si el derrame pleural es canceroso (neoplasia maligna). Estos tratamientos se utilizan normalmente para tratar el cáncer. Destruyen ciertas células del organismo.        Siga estas instrucciones en wu casa:    •Use los medicamentos de venta manoj y los recetados solamente terell se lo haya indicado el médico.      •Pregúntele al médico qué actividades son seguras para usted.      •Mantenga un registro de cuánto tiempo puede hacer ejercicio leve (terell caminar) antes de que le falte el aire. Escriba brian información para compartirla con el médico. Wu capacidad de hacer ejercicio físico debería mejorar con el paso del tiempo.      • No consuma ningún producto que contenga nicotina o tabaco, terell cigarrillos y cigarrillos electrónicos. Si necesita ayuda para dejar de consumir estos productos, consulte al médico.      •Concurra a todas las visitas de seguimiento terell se lo haya indicado el médico. Danielson es importante.        Comuníquese con un médico si:  •La cantidad de tiempo que puede hacer ejercicio leve:  •Disminuye.      •No mejora con el paso del tiempo.        •Tiene fiebre.        Solicite ayuda de inmediato si:    •Le falta el aire.      •Siente dolor en el pecho.      •Comienza a tener tos.        Resumen    •Un derrame pleural es sayda acumulación anormal de líquido en las capas de tejido que se encuentran entre los pulmones y el interior del tórax.      •El derrame pleural puede tener muchas causas, entre las que se incluyen insuficiencia cardíaca, embolia pulmonar, infecciones y cáncer.      •Algunos síntomas de derrame pleural pueden ser dificultad para respirar, dolor de pecho, fiebre, tos prolongada (crónica), hipo y respiración rápida.      •Para realizar el diagnóstico, suele tomarse imágenes del tórax (terell sayda ecografía o radiografía) y extraer líquido (toracocentesis) que se envía a analizar.      •El tratamiento del derrame pleural depende de la afección subyacente que lo cause.      Esta información no tiene terell fin reemplazar el consejo del médico. Asegúrese de hacerle al médico cualquier pregunta que tenga.      Document Revised: 10/18/2021 Document Reviewed: 09/03/2021    Elsevier Patient Education © 2022 Elsevier Inc.

## 2022-05-13 NOTE — ED PROVIDER NOTE - IV ALTEPLASE ADMIN OUTSIDE HIDDEN
----- Message from Damien Birch sent at 9/28/2020 11:01 AM EDT -----  Regarding: Dr. Snyder Folds Message/Vendor Calls    Caller's first and last name: Cooper Gonzales      Reason for call: Patient would like to have her labs done for a UTI      Callback required yes/no and why: yes      Best contact number(s): [phone]      Details to clarify the request: patient would like to have labs done for UTI      Ericka GUERRERO Cousins
Lab orders faxed to [facility].   Pt verbalized understanding of information discussed w/ no further questions at this time.
show

## 2022-05-13 NOTE — ED PROVIDER NOTE - CLINICAL SUMMARY MEDICAL DECISION MAKING FREE TEXT BOX
small R pleural effusion- not clinically significant- ok to follow up with pcp- inhaler prn for cough

## 2022-05-13 NOTE — ED PROVIDER NOTE - PATIENT PORTAL LINK FT
You can access the FollowMyHealth Patient Portal offered by [facility] by registering at the following website: [email or web address]. By joining [employer]’s FollowMyHealth portal, you will also be able to view your health information using other applications (apps) compatible with our system.

## 2022-05-13 NOTE — ED PROVIDER NOTE - OBJECTIVE STATEMENT
89 y/o M, PMHx of DM,HTN, and HLD presenting to ED with intermittent cough and episode of troubled breathing x1 month. Pt reports he was going to urinate when he had a coughing episode with troubled breathing. Pt denies dysuria, urinary frequency, hematuria, and lung problems. 89 y/o M, PMHx of DM,HTN, and HLD presenting to ED with intermittent cough and episode of troubled breathing for several months Pt reports he was going to urinate when he had a coughing episode with troubled breathing. Pt denies dysuria, urinary frequency, hematuria, and lung problems  cough int non prod no f/c  tolerating po normally  mod severity  px w min change in activity- walks with cane - minimally

## 2022-05-13 NOTE — ED ADULT NURSE NOTE - NSIMPLEMENTINTERV_GEN_ALL_ED
Implemented All Fall with Harm Risk Interventions:  [place] to call system. Call bell, personal items and telephone within reach. Instruct patient to call for assistance. Room bathroom lighting operational. Non-slip footwear when patient is off stretcher. Physically safe environment: no spills, clutter or unnecessary equipment. Stretcher in lowest position, wheels locked, appropriate side rails in place. Provide visual cue, wrist band, yellow gown, etc. Monitor gait and stability. Monitor for mental status changes and reorient to person, place, and time. Review medications for side effects contributing to fall risk. Reinforce activity limits and safety measures with patient and family. Provide visual clues: red socks.

## 2022-06-15 NOTE — ED ADULT NURSE NOTE - NS ED NURSE DISCH DISPOSITION
Pt states she had a carbon monoxide leak last week. Gas company came to check it out and told her there was no problem. Now she thinks freon is leaking from her freezer. She c/o feeling dizzy and SOB. Had 5 cans of beer today. \"I'm an alcoholic. \"  
Admitted

## 2022-07-25 NOTE — PATIENT PROFILE ADULT - FUNCTIONAL ASSESSMENT - BASIC MOBILITY 6.
2-calculated by average/Not able to assess (calculate score using [facility] averaging method)

## 2022-07-25 NOTE — H&P ADULT - PROBLEM SELECTOR PLAN 6
Pt with elevated Cr 1.48, as per chart review, patient with baseline of 1.4-1.6.   -continue to monitor Cr.   -will avoid nephrotoxic agents Patient on Lisinopril at home, unsure of other meds.  -med rec. Patient presented initially with BP of 150s, then downtrended to SBP 110s to 130s.  As per last DC note ,patient had been on lisinopril and amlodipine; however, patient unsure of medications.  -med rec. Patient presented initially with BP of 150s, then downtrended to SBP 110s to 130s.  As per last DC note ,patient had been on lisinopril and amlodipine; however, patient unsure of medications. As per med rec done on 7/25, patient not on any hypertensives. Called both [facility] and [facility].  -monitor BP

## 2022-07-25 NOTE — ED PROVIDER NOTE - OBJECTIVE STATEMENT
89 yo Mohawk speaking M with PMHx of recently diagnosed stomach/colon cancer (not on treatment), iron deficiency anemia, DM, HTN, HLD, DVT (not on Eliquis), claudication s/p RLQ SFA stent, BPH presenting with weakness. Per pt, has been experiencing weakness with ambulation for last 2-3 weeks, preventing him from walking (ambulates at baseline with cane). Denies lightheadedness, CP, palpitations, or LOC. He endorses fall 3mo ago with no trauma to head. Has had longstanding decreased appetite with decreased PO intake and 55lbs weight loss in last 5-6 months. Per pt, has trouble swallowing solids, tolerating liquids. Denies regurgitating food or N/V. Has not eaten today, except a small sip of coffee. Per daughter at bedside, pt eating very little at home. Denies SOB, fever/chills, URI symptoms, abd pain, diarrhea, dysuria. COVID vaccinated (4x vaccines). 89 yo Taiwanese speaking M with PMHx of recently diagnosed stomach/colon cancer (not on treatment), iron deficiency anemia, DM, HTN, HLD, DVT (not on Eliquis), claudication s/p RLQ SFA stent, BPH presenting with weakness. Per pt, has been experiencing weakness with ambulation for last 2-3 weeks, preventing him from walking (ambulates at baseline with cane). Denies lightheadedness, CP, palpitations, or LOC. He endorses fall 3mo ago with no trauma to head. Has had longstanding decreased appetite with decreased PO intake and 55lbs weight loss in last 5-6 months. Per pt, has trouble swallowing solids, tolerating liquids. Denies regurgitating food or N/V. Has not eaten today, except a small sip of coffee. Per daughter at bedside, pt eating very little at home. Denies SOB, fever/chills, URI symptoms, abd pain, diarrhea, dysuria. COVID vaccinated (4x vaccines).     ID: [id number] 89 yo Czech speaking M with PMHx of recently diagnosed stomach/colon cancer (not on treatment), iron deficiency anemia, DM, HTN, HLD, DVT (not on Eliquis), claudication s/p RLE SFA stent, BPH presenting with weakness. Per pt, has been experiencing weakness with ambulation for last 2-3 weeks, preventing him from walking (ambulates at baseline with cane). Denies lightheadedness, CP, palpitations, or LOC. He endorses fall 3mo ago with no trauma to head. Has had longstanding decreased appetite with decreased PO intake and 55lbs weight loss in last 5-6 months. Per pt, has trouble swallowing solids, tolerating liquids. Denies regurgitating food or N/V. Has not eaten today, except a small sip of coffee. Per daughter at bedside, pt eating very little at home. Denies SOB, fever/chills, URI symptoms, abd pain, diarrhea, dysuria. COVID vaccinated (4x vaccines).     ID: [id number]

## 2022-07-25 NOTE — H&P ADULT - ATTENDING COMMENTS
Pt. seen and examined by me earlier today; I have read Dr. Neumann's H&P, I agree w/ her findings and plan of care as documented; cont. CTX + azithromycin, supportive care, f/u cultures, PT eval

## 2022-07-25 NOTE — ED PROVIDER NOTE - ATTENDING APP SHARED VISIT CONTRIBUTION OF CARE
pt w gastric / colon CA, decreased PO and generalized weakness  dry mucous membranes and generally weak, fever here on second set , lactic reassuring, + infiltrates, will cover for CAP,   also consider pulm involvement of CA  hydrate  / admit to medicine

## 2022-07-25 NOTE — H&P ADULT - HISTORY OF PRESENT ILLNESS
Patient is a 89 yo Turkish speaking M with PMHx of recently diagnosed stomach/colon cancer (not on treatment), iron deficiency anemia, DM, HTN, HLD, DVT (not on Eliquis), claudication s/p RLE SFA stent, BPH presenting with weakness. Per pt, has been experiencing weakness with ambulation for last 2-3 weeks, preventing him from walking (ambulates at baseline with cane). He endorses fall 3mo ago with no trauma to head. Has had longstanding decreased appetite with decreased PO intake and 55lbs weight loss in last 5-6 months. Per pt, has trouble swallowing solids, tolerating liquids. Denies regurgitating food or N/V. Has not eaten today, except a small sip of coffee. Per daughter at bedside, pt eating very little at home. Denies SOB, fever/chills, URI symptoms, abd pain, diarrhea, dysuria, lightheadedness, CP, palpitations. Currently denying cough, urinary symptoms, abd pain.  In the ED  T 99.2, , /64, RR 18 97%  Labs Hg 10.4, WBC 9.39, Plt 423, lactate 1.6, BUN 19, Cr 1.48, Alb 3.0, Alk Phos 217, Tro 0.01,   CXR demonstrating   Course: Ceftriaxone 1g, Azithro 500mg, Tylenol, 2L NaCl         Patient is a 89 yo Pashto speaking M with PMHx of recently diagnosed stomach/colon cancer (not yet on tx, diagnosed 1 month ago, follows w Dr Bolanos) iron deficiency anemia, DM, HTN, HLD, DVT s/p treatment with eliquis, claudication s/p RLE SFA stent, BPH presenting with weakness for two weeks. Per daughter, has been experiencing weakness with ambulation for last 2-3 weeks, preventing him from walking and getting out of bed. As per daughter, patient has not been eating due to loss of appetite, resulting in a significant weight loss of roughly 20-30 lbs in the past 4 mos. Daughter endorses that patient has been having increased SOB when ambulating short distances, but denying any chest pain.  Patient endorsing difficulty swallowing solids but no issue swallowing liquids, also endorsing nausea, but no vomiting. Denying fever/chills, URI symptoms, abd pain, diarrhea, dysuria, lightheadedness, CP, palpitations. Currently endorsing productive cough. No hx of sick contacts, no hx of travel, no le swelling.   In the ED  T 99.2-100.9, , /64, RR 18 97%  Labs Hg 10.4, WBC 9.39, Plt 423, lactate 1.6, BUN 19, Cr 1.48, Alb 3.0, Alk Phos 217, Tro 0.01,   CXR demonstrating   Course: Ceftriaxone 1g, Azithro 500mg, Tylenol, 2L NaCl         Patient is a 89 yo Syriac speaking M with PMHx of recently diagnosed stomach/colon cancer (not yet on tx, diagnosed 1 month ago, via biopsy at [facility], follows w Dr Bolanos) iron deficiency anemia, DM, HTN, HLD, DVT s/p treatment with eliquis, claudication s/p RLE SFA stent, BPH presenting with weakness for two weeks. Per daughter, has been experiencing weakness with ambulation for last 2-3 weeks, preventing him from walking and getting out of bed. As per daughter, patient has not been eating due to loss of appetite, resulting in a significant weight loss of roughly 20-30 lbs in the past 4 mos. Daughter endorses that patient has been having increased SOB when ambulating short distances, but denying any chest pain.  Patient endorsing difficulty swallowing solids but no issue swallowing liquids, also endorsing nausea, but no vomiting. Denying fever/chills, URI symptoms, abd pain, diarrhea, dysuria, lightheadedness, CP, palpitations. Currently endorsing productive cough. No hx of sick contacts, no hx of travel, no le swelling.     In the ED  T 99.2-100.9, , /64, RR 18 97%  Labs Hg 10.4, WBC 9.39, Plt 423, lactate 1.6, BUN 19, Cr 1.48, Alb 3.0, Alk Phos 217, Tro 0.01,   CXR demonstrating   Course: Ceftriaxone 1g, Azithro 500mg, Tylenol, 2L NaCl         Patient is a 89 yo Armenian speaking M with PMHx of recently diagnosed stomach/colon cancer (not yet on tx, diagnosed 1 month ago, via biopsy at [facility], follows w Dr Bolanos) iron deficiency anemia, DM, HTN, HLD, DVT s/p treatment with eliquis, claudication s/p RLE SFA stent, BPH presenting with weakness for two weeks. Per daughter, has been experiencing weakness with ambulation for last 2-3 weeks, preventing him from walking and getting out of bed. As per daughter, patient has not been eating due to loss of appetite, resulting in a significant weight loss of roughly 20-30 lbs in the past 4 mos. Daughter endorses that patient has been having increased SOB when ambulating short distances, but denying any chest pain. Patient also endorsing nausea, but no vomiting. Denying fever/chills, URI symptoms, abd pain, diarrhea, dysuria, lightheadedness, CP, palpitations. Currently endorsing productive cough. No hx of sick contacts, no hx of travel, no le swelling.     In the ED  T 99.2-100.9, , /64, RR 18 97%  Labs Hg 10.4, WBC 9.39, Plt 423, lactate 1.6, BUN 19, Cr 1.48, Alb 3.0, Alk Phos 217, Tro 0.01,   CXR demonstrating   Course: Ceftriaxone 1g, Azithro 500mg, Tylenol, 2L NaCl

## 2022-07-25 NOTE — H&P ADULT - PROBLEM SELECTOR PLAN 5
Pt with elevated Cr 1.48, as per chart review, patient with baseline of 1.4-1.6.   -continue to monitor Cr.   -will avoid nephrotoxic agents Pt with elevated Cr 1.48, as per chart review, patient with baseline of 1.4-1.6.   -continue to monitor Cr.   -will avoid nephrotoxic agents    #BPH  -continue with tamsulosin 0.4mg daily  -will bladder scan q6

## 2022-07-25 NOTE — H&P ADULT - NSHPPHYSICALEXAM_GEN_ALL_CORE
.  VITAL SIGNS:  T(C): 36.6 (07-25-22 @ 12:12), Max: 38.3 (07-25-22 @ 10:41)  T(F): 97.8 (07-25-22 @ 12:12), Max: 100.9 (07-25-22 @ 10:41)  HR: 100 (07-25-22 @ 12:12) (100 - 117)  BP: 152/74 (07-25-22 @ 12:12) (104/64 - 152/74)  BP(mean): --  RR: 19 (07-25-22 @ 12:12) (18 - 19)  SpO2: 94% (07-25-22 @ 12:12) (94% - 97%)  Wt(kg): --    PHYSICAL EXAM:    Constitutional: WDWN resting comfortably in bed; NAD  Head: NC/AT  Eyes: PERRL, EOMI, clear conjunctiva  ENT: no nasal discharge; uvula midline, no oropharyngeal erythema or exudates; MMM  Neck: supple; no JVD or thyromegaly  Respiratory: CTA B/L; no W/R/R, no retractions  Cardiac: +S1/S2; RRR; no M/R/G; PMI non-displaced  Gastrointestinal: soft, NT/ND; no rebound or guarding; +BSx4  Genitourinary: normal external genitalia  Back: spine midline, no bony tenderness or step-offs; no CVAT B/L  Extremities: WWP, no clubbing or cyanosis; no peripheral edema  Musculoskeletal: NROM x4; no joint swelling, tenderness or erythema  Vascular: 2+ radial, femoral, DP/PT pulses B/L  Dermatologic: skin warm, dry and intact; no rashes, wounds, or scars  Lymphatic: no submandibular or cervical LAD  Neurologic: AAOx3; CNII-XII grossly intact; no focal deficits  Psychiatric: affect and characteristics of appearance, verbalizations, behaviors are appropriate .  VITAL SIGNS:  T(C): 36.6 (07-25-22 @ 12:12), Max: 38.3 (07-25-22 @ 10:41)  T(F): 97.8 (07-25-22 @ 12:12), Max: 100.9 (07-25-22 @ 10:41)  HR: 100 (07-25-22 @ 12:12) (100 - 117)  BP: 152/74 (07-25-22 @ 12:12) (104/64 - 152/74)  BP(mean): --  RR: 19 (07-25-22 @ 12:12) (18 - 19)  SpO2: 94% (07-25-22 @ 12:12) (94% - 97%)  Wt(kg): --    PHYSICAL EXAM:    Constitutional: elderly male, resting comfortably  Head: NC/AT  Eyes: PERRL, EOMI, clear conjunctiva  ENT: no nasal discharge; uvula midline, no oropharyngeal erythema or exudates;  mucous membranes dry  Neck: supple; no JVD or thyromegaly  Respiratory: rales noted on right upper and lower lobes, otherwise ctabl  Cardiac: +S1/S2; RRR; no M/R/G; PMI non-displaced  Gastrointestinal: soft, but distended belly, non tender to palpation  Extremities: WWP, no clubbing or cyanosis; no peripheral edema, pain on palpation of LLE  Musculoskeletal: NROM x4; no joint swelling, tenderness or erythema  Vascular: 2+ radial, femoral, DP/PT pulses B/L  Dermatologic: skin warm, dry and intact; no rashes, wounds, or scars  Lymphatic: no submandibular or cervical LAD  Neurologic: AAOx3; CNII-XII grossly intact; no focal deficits  Psychiatric: affect and characteristics of appearance, verbalizations, behaviors are appropriate

## 2022-07-25 NOTE — PATIENT PROFILE ADULT - FALL HARM RISK - HARM RISK INTERVENTIONS

## 2022-07-25 NOTE — ED PROVIDER NOTE - CLINICAL SUMMARY MEDICAL DECISION MAKING FREE TEXT BOX
89 yo Lao speaking M with recently diagnosed gastric/colon cancer p/w weakness in setting of decreased PO intake and 55lb weight loss in last 5-6 months. Pt tachycardic to 111, /64, afebrile. EKG with no ST/T wave changes. Pt dry on exam. CBC with Hgb 10.4 c/w prior CBC. CMP c/w prior. Troponin neg, lactate 1.6. Weakness likely in setting of dehydration/low PO intake. Will do labs and evaluate for possible infection. Will give fluids and reassess.

## 2022-07-25 NOTE — H&P ADULT - PROBLEM SELECTOR PLAN 1
Presented with fever, tachycardic, and source likely PNA. Still awaiting UA, though patient currently not endorsing urinary symptoms. Denying any other constitutional sx such as abd pain, diarrhea, chills.   -follow blood and urine cultures  -follow urinanalysis  -s/p 2 L nacl  -treatment of pna as below

## 2022-07-25 NOTE — ED PROVIDER NOTE - NS ED ATTENDING STATEMENT MOD
This was a shared visit with the ADELSO. I reviewed and verified the documentation and independently performed the documented:

## 2022-07-25 NOTE — ED PROVIDER NOTE - NSICDXPASTMEDICALHX_GEN_ALL_CORE_FT
PAST MEDICAL HISTORY:  BPH (benign prostatic hyperplasia)     Cancer of stomach     Claudication     Colon cancer     DM (diabetes mellitus)     DVT (deep venous thrombosis)     HLD (hyperlipidemia)     Hypertension

## 2022-07-25 NOTE — H&P ADULT - PROBLEM SELECTOR PLAN 3
Presenting with 2 weeks of weakness and difficulty ambulating. Ddx includes malignancy vs poor po intake vs infection vs possible DVT.  -workup and mangement of infection as above.  -will add on TSH/b12/folate   -will obtain nutrition consult for malnutirtion and weight loss. Presenting with 2 weeks of weakness and difficulty ambulating. Ddx includes malignancy vs poor po intake vs infection.  -workup and mangement of infection as above.  -will add on TSH/b12/folate   -will obtain nutrition consult for malnutrition and weight loss.  -Poor PO intake likely 2/2 nausea in addition to dysphagia. For nausea will place patient on Zofran IV q6, and for dysphagia will start with speech and swallow evaluation. Presenting with 2 weeks of weakness and difficulty ambulating. Ddx includes malignancy vs poor po intake vs infection.  -workup and mangement of infection as above.  -will add on TSH/b12/folate   -will obtain nutrition consult for malnutrition and weight loss.  -Poor PO intake likely 2/2 nausea in addition to lack of appetitie, likely 2/2 gi malignancy.

## 2022-07-25 NOTE — ED ADULT TRIAGE NOTE - CHIEF COMPLAINT QUOTE
pt Armenian speaking only presents w daughter due to increased weakness, runny nose for the last week and 55lbs weight loss since 5-6 months ago. recently diagnosed with stomach and colon cancer 2 months ago, not on treatment yet.

## 2022-07-25 NOTE — ED ADULT NURSE NOTE - INTERVENTIONS DEFINITIONS
[place] to call system/Call bell, personal items and telephone within reach/Instruct patient to call for assistance/Room bathroom lighting operational/Non-slip footwear when patient is off stretcher/Physically safe environment: no spills, clutter or unnecessary equipment/Stretcher in lowest position, wheels locked, appropriate side rails in place/Provide visual cue, wrist band, yellow gown, etc./Monitor gait and stability/Review medications for side effects contributing to fall risk/Reinforce activity limits and safety measures with patient and family/Provide visual clues: red socks

## 2022-07-25 NOTE — H&P ADULT - PROBLEM SELECTOR PLAN 2
Patient presenting with sepsis and weakness, source likely 2/2 PNA. On xray right sided consolidation noted. Denying cough, SOB currently.  Has had no recent hospitilizations or recent abx use. COVID -.  -C/w Ctx and Azithro for CAP coverage  -follow blood, sputum, cultures  -follow legionella and strep  -follow MRSA and RVP

## 2022-07-25 NOTE — H&P ADULT - PROBLEM SELECTOR PLAN 4
Patient recently diagnosed with stomach and colon cancer 1 month ago. Has yet to begin treatment, has an appointment with Dr. Bolanos on 8/2/22.   -will alert Dr. Bolanos that patient is admitted  -continue to monitor

## 2022-07-25 NOTE — ED ADULT NURSE NOTE - BEFAST ARM SIDE DRIFT

## 2022-07-25 NOTE — ED PROVIDER NOTE - PROGRESS NOTE DETAILS
Rectal temp 100. CXR with new R sided opacity. No hx of recent hospitalization. Started on azithromycin/ceftriaxone for coverage of community-acquired pneumonia. Rectal temp 100.9. CXR with new R sided opacity. No hx of recent hospitalization. Started on azithromycin/ceftriaxone for coverage of community-acquired pneumonia.

## 2022-07-25 NOTE — H&P ADULT - PROBLEM SELECTOR PLAN 8
Patient with DM.  -continue mIss F-s/p 2L nacl  E-replete PRN  N-npo pending bedside swallow eval  DVT-heparin SQ F-s/p 2L nacl  E-replete PRN  N-puree  DVT-heparin SQ  CODE FULL

## 2022-07-25 NOTE — ED ADULT NURSE NOTE - OBJECTIVE STATEMENT
Patient presents with daughter. Patient offered translation services, patient requesting daughter (Gaby Gonzales) to interpret. Daughter reports patient dx with stomach cancer x 2 months ago. Reports weight loss, poor PO intake (patient reports feeling hungry), and generalized weakness x several weeks. Patient speaking in full, complete sentences. Denies CP/SOB at present. Patient endorses intermittent dizziness--denies dizziness at present. Denies urinary symptoms.

## 2022-07-25 NOTE — ED ADULT NURSE NOTE - CHIEF COMPLAINT QUOTE
pt Lao speaking only presents w daughter due to increased weakness, runny nose for the last week and 55lbs weight loss since 5-6 months ago. recently diagnosed with stomach and colon cancer 2 months ago, not on treatment yet.

## 2022-07-25 NOTE — PATIENT PROFILE ADULT - NSPRESCRUSEDDRG_GEN_A_NUR
Patient called stating he is need of his medication Lamotrigine 100 mg. Patient said he only has 2 days let and has never been off of this medication. Please send refill or call patient back to discuss and advise.    No

## 2022-07-25 NOTE — H&P ADULT - NSHPLABSRESULTS_GEN_ALL_CORE
.  LABS:                         10.4   9.39  )-----------( 423      ( 25 Jul 2022 10:27 )             33.4     07-25    136  |  102  |  19  ----------------------------<  157<H>  4.6   |  23  |  1.48<H>    Ca    8.5      25 Jul 2022 10:27    TPro  7.0  /  Alb  3.0<L>  /  TBili  0.5  /  DBili  x   /  AST  21  /  ALT  10  /  AlkPhos  217<H>  07-25        CARDIAC MARKERS ( 25 Jul 2022 10:27 )  x     / <0.01 ng/mL / x     / x     / x            Lactate, Blood: 1.6 mmol/L (07-25 @ 10:42)      RADIOLOGY, EKG & ADDITIONAL TESTS: Reviewed.

## 2022-07-25 NOTE — H&P ADULT - ASSESSMENT
Patient is a 89 yo Paraguayan speaking M with PMHx of recently diagnosed stomach/colon cancer (not on treatment), iron deficiency anemia, DM, HTN, HLD, DVT (not on Eliquis), claudication s/p RLE SFA stent, BPH presenting with weakness, admitted for sepsis secondary to PNA.

## 2022-07-26 NOTE — PROGRESS NOTE ADULT - PROBLEM SELECTOR PLAN 2
Patient presenting with sepsis and weakness, source likely 2/2 PNA. On xray right sided consolidation noted. Denying cough, SOB currently.  Has had no recent hospitilizations or recent abx use. COVID -.  -C/w Ctx and Azithro for CAP coverage  -follow blood, sputum, cultures  -follow legionella and strep  -follow MRSA and RVP Patient presenting with sepsis and weakness, source likely 2/2 PNA. On xray right sided consolidation noted. Denying cough, SOB currently.  Has had no recent hospitilizations or recent abx use. Negative COVID and RVP. Urine strep negative  -C/w Ctx and Azithro for CAP coverage  -follow blood, sputum, cultures  -follow legionella  -follow MRSA and RVP Patient presenting with sepsis and weakness, source likely 2/2 PNA. On xray right sided consolidation noted. Denying cough, SOB currently.  Has had no recent hospitilizations or recent abx use. Negative COVID and RVP. Urine strep negative. Negative legionella.  -C/w Ctx for CAP coverage  -follow blood, sputum, cultures  -follow MRSA

## 2022-07-26 NOTE — PHYSICAL THERAPY INITIAL EVALUATION ADULT - PERTINENT HX OF CURRENT PROBLEM, REHAB EVAL
Patient is a 89 yo Samoan speaking M with PMHx of recently diagnosed stomach/colon cancer (not on treatment), iron deficiency anemia, DM, HTN, HLD, DVT (not on Eliquis), claudication s/p RLE SFA stent, BPH presenting with weakness, admitted for sepsis secondary to PNA.

## 2022-07-26 NOTE — DISCHARGE NOTE PROVIDER - NSDCMRMEDTOKEN_GEN_ALL_CORE_FT
mirtazapine 15 mg oral tablet: 1 tab(s) orally once a day (at bedtime)  SITagliptin 100 mg oral tablet: 1 tab(s) orally once a day  tamsulosin 0.4 mg oral capsule: 1 cap(s) orally once a day

## 2022-07-26 NOTE — DIETITIAN INITIAL EVALUATION ADULT - PERTINENT MEDS FT
MEDICATIONS  (STANDING):  atorvastatin 10 milliGRAM(s) Oral at bedtime  cefTRIAXone   IVPB 1000 milliGRAM(s) IV Intermittent every 24 hours  dextrose 5%. 1000 milliLiter(s) (50 mL/Hr) IV Continuous <Continuous>  dextrose 50% Injectable 25 Gram(s) IV Push once  glucagon  Injectable 1 milliGRAM(s) IntraMuscular once  heparin   Injectable 5000 Unit(s) SubCutaneous every 8 hours  insulin lispro (ADMELOG) corrective regimen sliding scale   SubCutaneous Before meals and at bedtime  mirtazapine 15 milliGRAM(s) Oral at bedtime  tamsulosin 0.4 milliGRAM(s) Oral at bedtime    MEDICATIONS  (PRN):  acetaminophen     Tablet .. 650 milliGRAM(s) Oral every 6 hours PRN Temp greater or equal to 38C (100.4F), Mild Pain (1 - 3)  dextrose Oral Gel 15 Gram(s) Oral once PRN Blood Glucose LESS THAN 70 milliGRAM(s)/deciliter  ondansetron    Tablet 4 milliGRAM(s) Oral every 6 hours PRN Nausea

## 2022-07-26 NOTE — PHYSICAL THERAPY INITIAL EVALUATION ADULT - ADDITIONAL COMMENTS
pt states that he lives alone in an apartment w/ 5 steps to enter. States that he has a friend who comes to visit. Denies hx of recent falls. states that he has a SC has not ambulated recently

## 2022-07-26 NOTE — DISCHARGE NOTE PROVIDER - NSDCCPCAREPLAN_GEN_ALL_CORE_FT
PRINCIPAL DISCHARGE DIAGNOSIS  Diagnosis: Pneumonia  Assessment and Plan of Treatment: You came into the hospital with weakness, shortness of breath and ca cough. We performed a chest X ray and found that you have signs of pneumonia. Pneumonia is a lung infection that causes inflammation and the buildup of mucus and fluids in the lungs. This may cause coughing and difficulty breathing. Community-acquired pneumonia is pneumonia that develops in people who are not, and have not recently been, in a hospital or other health care facility.  Usually, pneumonia develops as a result of an illness that is caused by a virus, such as the common cold and the flu (influenza). It can also be caused by bacteria or fungi. While the common cold and influenza can pass from person to person (are contagious), pneumonia itself is not considered contagious. This condition may be caused by viruses, bacteria, and fungi.  We initially treated you impirically with two antibiotic medications called Azithromycin and ceftriaxone. We then transitioned to treating you with only ceftriaxone.      SECONDARY DISCHARGE DIAGNOSES  Diagnosis: Adult failure to thrive  Assessment and Plan of Treatment:      PRINCIPAL DISCHARGE DIAGNOSIS  Diagnosis: Sepsis  Assessment and Plan of Treatment: A life-threatening complication of an infection.  Sepsis occurs when chemicals released in the bloodstream to fight an infection trigger inflammation throughout the body. This can cause a cascade of changes that damage multiple organ systems, leading them to fail, sometimes even resulting in death.  Symptoms include fever, difficulty breathing, low blood pressure, fast heart rate, and mental confusion.  Treatment includes antibiotics and intravenous fluids.      SECONDARY DISCHARGE DIAGNOSES  Diagnosis: Pneumonia  Assessment and Plan of Treatment: You came into the hospital with weakness, shortness of breath and ca cough. We performed a chest X ray and found that you have signs of pneumonia. Pneumonia is a lung infection that causes inflammation and the buildup of mucus and fluids in the lungs. This may cause coughing and difficulty breathing. Community-acquired pneumonia is pneumonia that develops in people who are not, and have not recently been, in a hospital or other health care facility.  Usually, pneumonia develops as a result of an illness that is caused by a virus, such as the common cold and the flu (influenza). It can also be caused by bacteria or fungi. While the common cold and influenza can pass from person to person (are contagious), pneumonia itself is not considered contagious. This condition may be caused by viruses, bacteria, and fungi.  We initially treated you impirically with two antibiotic medications called Azithromycin and ceftriaxone. We then transitioned to treating you with only ceftriaxone.    Diagnosis: Sepsis  Assessment and Plan of Treatment:

## 2022-07-26 NOTE — DISCHARGE NOTE PROVIDER - ATTENDING DISCHARGE PHYSICAL EXAMINATION:
Patient was seen and examined at bedside on 7/29/2022 at 930 am. Patient with improvement of symptoms. Vitals, labwork and pertinent imaging reviewed - CXR w/ evidence of R sided effusion. Physical exam - NAD, AAO x 4, PERRLA, HEENT, EOMI, MMM, supple neck, chest - decreased BS at R base, CV - rrr, s1s2, no m/r/g, abd - soft, NTND, + BS, ext -  wwp, psych - normal affect; skin - no rash, pt appears malnourished    Plan  -C/w abx  -PT rec [facility] but family wants to take patient home  -Palliative consulted on 7/26 appreciate recs - home hospice set up  -Pulm consult  - plan for thoracentesis on 7/29 and d/c after

## 2022-07-26 NOTE — PROGRESS NOTE ADULT - PROBLEM SELECTOR PLAN 4
Patient recently diagnosed with stomach and colon cancer 1 month ago. Has yet to begin treatment, has an appointment with Dr. Bolanos on 8/2/22.   -will alert Dr. Bolanos that patient is admitted  -continue to monitor Patient recently diagnosed with stomach and colon cancer 1 month ago. Has yet to begin treatment, has an appointment with Dr. Bolanos on 8/2/22. Called Dr. Bolanos, per Dr. Bolanos pt saw their pallaitive team and dont want any treatment, strict comfort care. Daughter is main proxy. PT recs [facility]. Per palliative, pt wants to go home, hospice referral will be sent after oncology devan with Dr Bolanos on Aug 2nd. per wet read,    -will alert Dr. Bolanos that patient is admitted  -continue to monitor  -ordered abd US to assess for abd distension.   -moderate pleural effusion on right side. Consulted pulm.

## 2022-07-26 NOTE — DIETITIAN INITIAL EVALUATION ADULT - PERTINENT LABORATORY DATA
07-25    136  |  102  |  19  ----------------------------<  157<H>  4.6   |  23  |  1.48<H>    Ca    8.5      25 Jul 2022 10:27    TPro  7.0  /  Alb  3.0<L>  /  TBili  0.5  /  DBili  x   /  AST  21  /  ALT  10  /  AlkPhos  217<H>  07-25  POCT Blood Glucose.: 163 mg/dL (07-26-22 @ 08:51)

## 2022-07-26 NOTE — PROGRESS NOTE ADULT - PROBLEM SELECTOR PLAN 3
Presenting with 2 weeks of weakness and difficulty ambulating. Ddx includes malignancy vs poor po intake vs infection.  -workup and mangement of infection as above.  -will add on TSH/b12/folate   -will obtain nutrition consult for malnutrition and weight loss.  -Poor PO intake likely 2/2 nausea in addition to lack of appetitie, likely 2/2 gi malignancy. Presenting with 2 weeks of weakness and difficulty ambulating. Ddx includes malignancy vs poor po intake vs infection.  -workup and mangement of infection as above.  -will add on TSH/b12/folate   -will obtain nutrition consult for malnutrition and weight loss.  -Poor PO intake likely 2/2 nausea in addition to lack of appetitie, likely 2/2 gi malignancy.  -Added ensure enlive BID

## 2022-07-26 NOTE — DIETITIAN INITIAL EVALUATION ADULT - OTHER INFO
89 yo Jamaican speaking M with PMHx of recently diagnosed stomach/colon cancer (not on treatment), iron deficiency anemia, DM, HTN, HLD, DVT (not on Eliquis), claudication s/p RLE SFA stent, BPH presenting with weakness, admitted for sepsis secondary to PNA.     Pt seen at bedside for initial assessment. Denies nausea/vomiting at this time. Reports last bowel movement 7/26. Confirms NKFA. Pt confirms decrease in PO intake PTA, however, he notes it has improved over the last few days. Reports usual body weight pounds 200lbs over the last month. However, as per [facility] records, pt weighed 167lbs in May 2022, reflecting a 17lb, 10% wt loss in 3 months, significant. No edema documented at this time. No pressure ulcers documented at this time. Clement score=18. Nutrition focused physical exam significant for moderate orbital and shoulder wasting. Based on ASPEN guidelines, pt meets the criteria for malnutrition at this time. Discussed current diet order Pureed diet; provided pt with diet education regarding importance of PO/protein intake; amenable to education. Pt reports feeling hungry during hospital stay, able to complete % of meals. Made aware RD remains available. RD to follow up per protocol. See nutrition recommendations below.  89 yo Guyanese speaking M with PMHx of recently diagnosed stomach/colon cancer (not on treatment), iron deficiency anemia, DM, HTN, HLD, DVT (not on Eliquis), claudication s/p RLE SFA stent, BPH presenting with weakness, admitted for sepsis secondary to PNA.     Pt seen at bedside for initial assessment. Denies nausea/vomiting at this time. Reports last bowel movement 7/26. Confirms NKFA. Pt confirms decrease in PO intake PTA, however, he notes it has improved over the last few days. Reports usual body weight 200lbs, claims to lose 50 lbs over the last few months. However, as per [facility] records, pt weighed 167lbs in May 2022, reflecting a 17lb, 10% wt loss in 3 months, significant. No edema documented at this time. No pressure ulcers documented at this time. Clement score=18. Nutrition focused physical exam significant for moderate orbital and shoulder wasting. Based on ASPEN guidelines, pt meets the criteria for malnutrition at this time. Discussed current diet order Pureed diet; provided pt with diet education regarding importance of PO/protein intake. Discussed small frequent meals in the setting of poor appetite; amenable to education. Pt reports feeling hungry during hospital stay, able to complete % of meals. Made aware RD remains available. RD to follow up per protocol. See nutrition recommendations below.  89 yo Samoan speaking M with PMHx of recently diagnosed stomach/colon cancer (not on treatment), iron deficiency anemia, DM, HTN, HLD, DVT (not on Eliquis), claudication s/p RLE SFA stent, BPH presenting with weakness, admitted for sepsis secondary to PNA.     Pt seen at bedside for initial assessment.  services used (ID/name: [id number]). Denies nausea/vomiting at this time. Reports last bowel movement 7/26. Confirms NKFA. Pt confirms decrease in PO intake PTA, however, he notes it has improved over the last few days. Reports usual body weight 200lbs, claims to lose 50 lbs over the last few months. However, as per [facility] records, pt weighed 167lbs in May 2022, reflecting a 17lb, 10% wt loss in 3 months, significant. No edema documented at this time. No pressure ulcers documented at this time. Clement score=18. Nutrition focused physical exam significant for moderate orbital and shoulder wasting. Based on ASPEN guidelines, pt meets the criteria for malnutrition at this time. Discussed current diet order Pureed diet; provided pt with diet education regarding importance of PO/protein intake. Discussed small frequent meals in the setting of poor appetite; amenable to education. Pt reports feeling hungry during hospital stay, able to complete % of meals. Made aware RD remains available. RD to follow up per protocol. See nutrition recommendations below.

## 2022-07-26 NOTE — CONSULT NOTE ADULT - ATTENDING COMMENTS
New right pleural effusion in setting of active malignancy. May be malignant. Pending thoracentesis once Plavix has been stopped.

## 2022-07-26 NOTE — DISCHARGE NOTE PROVIDER - NSDCFUADDAPPT_GEN_ALL_CORE_FT
Appointment with Dr. Bolanos scheduled for 8/2/22  [address] [place] [address]  [phone]

## 2022-07-26 NOTE — PROGRESS NOTE ADULT - PROBLEM SELECTOR PLAN 5
Pt with elevated Cr 1.48, as per chart review, patient with baseline of 1.4-1.6.   -continue to monitor Cr.   -will avoid nephrotoxic agents    #BPH  -continue with tamsulosin 0.4mg daily  -will bladder scan q6

## 2022-07-26 NOTE — DIETITIAN INITIAL EVALUATION ADULT - NS FNS DIET ORDER
Diet, Pureed:   Supplement Feeding Modality:  Oral  Ensure Enlive Cans or Servings Per Day:  2       Frequency:  Two Times a day (07-26-22 @ 11:10)   Diet, Pureed:   Supplement Feeding Modality:  Oral  Ensure Enlive Cans or Servings Per Day:  2       Frequency:  Two Times a day (07-26-22 @ 11:10)

## 2022-07-26 NOTE — PHYSICAL THERAPY INITIAL EVALUATION ADULT - GAIT DEVIATIONS NOTED, PT EVAL
decreased girish/increased time in double stance/decreased step length/decreased weight-shifting ability

## 2022-07-26 NOTE — DISCHARGE NOTE PROVIDER - HOSPITAL COURSE
#Discharge: do not delete    Patient is a 89 yo Russian speaking M with PMHx of recently diagnosed stomach/colon cancer (not on treatment), iron deficiency anemia, DM, HTN, HLD, DVT (not on Eliquis), claudication s/p RLE SFA stent, BPH presenting with weakness, admitted for sepsis secondary to PNA.     Hospital course (by problem):        Problem/Plan - 1: Sepsis 2/2 pneumonia  -p/w fever, tachycardic, and source likely PNA. Still awaiting UA, though patient currently not endorsing urinary symptoms. Denying any other constitutional sx such as abd pain, diarrhea, chills. Negative COVID and RVP. Urine strep negative, urine legionella negative  -follow blood and urine cultures  -  -s/p 2 L nacl  -treatment of pna as below.     Problem/Plan - 2: Pneumonia  -pt p/wepsis and weakness, source likely 2/2 PNA. On xray right sided consolidation noted. Denying cough, SOB currently.  Has had no recent hospitilizations or recent abx use. Negative COVID and RVP. Urine strep negative, urine legionella negative  -Initially tx with CTX and Azithro for CAP coverage, d/c Azithro once legionella was r/o  -follow blood, sputum, cultures  -follow MRSA        Problem/Plan - 3: Weakness   -pt p/w 2 weeks of weakness and difficulty ambulating. Ddx includes malignancy vs poor po intake vs infection.  -workup and mangement of infection as above.  -will add on TSH/b12/folate   -will obtain nutrition consult for malnutrition and weight loss.  -Poor PO intake likely 2/2 nausea in addition to lack of appetitie, likely 2/2 gi malignancy.     Problem/Plan - 4: Cancer of stomach  -pt recently diagnosed with stomach and colon cancer 1 month ago. Has yet to begin treatment, has an appointment with Dr. Bolanos on 8/2/22.   -will alert Dr. Bolanos that patient is admitted  -continue to monitor.     Problem/Plan - 5: Stage 3 chronic kidney disease.   -Pt with elevated Cr 1.48, as per chart review, patient with baseline of 1.4-1.6.   -continue to monitor Cr.   -will avoid nephrotoxic agents       Problem/Plan - 5: BPH  -continue with tamsulosin 0.4mg daily  -will bladder scan q6, pt refused scans     Problem/Plan - 7: Hypertension.   -presented initially with BP of 150s, then downtrended to SBP 110s to 130s.  As per last DC note ,patient had been on lisinopril and amlodipine; however, patient unsure of medications. As per med rec done on 7/25, patient not on any hypertensives. Called both [facility] and [facility].  -monitor BP.     Problem/Plan - 8: DM (diabetes mellitus).   -Patient with DM.  -continue mIss.    Patient was discharged to: (home/[facility]/acute rehab/hospice, etc, and with what services – home health PT/RN? Home O2?)    New medications:     Changes to old medications:    Medications that were stopped:    Items to follow up as outpatient:    Physical exam at the time of discharge:       #Discharge: do not delete    Patient is a 89 yo [place] citizen speaking M with PMHx of recently diagnosed stomach/colon cancer (not on treatment), iron deficiency anemia, DM, HTN, HLD, DVT (not on Eliquis), claudication s/p RLE SFA stent, BPH presenting with weakness, admitted for sepsis secondary to PNA.     Hospital course (by problem):        Problem/Plan - 1: Sepsis 2/2 pneumonia  -p/w fever, tachycardic, and source likely PNA. Still awaiting UA, though patient currently not endorsing urinary symptoms. Denying any other constitutional sx such as abd pain, diarrhea, chills. Negative COVID and RVP. Urine strep negative, urine legionella negative, cultures were negative. CTX given for CAP.     Problem/Plan - 2: Pneumonia   pt p/sepsis and weakness, source likely 2/2 PNA. On xray right sided consolidation noted. Denying cough, SOB currently.  Has had no recent hospitilizations or recent abx use. Negative COVID and RVP. Urine strep negative, urine legionella negative  -Tx with CTX and Azithro for CAP coverage, d/c Azithro once legionella was r/o       Problem/Plan - 3: Weakness   pt p/w 2 weeks of weakness and difficulty ambulating. Ddx includes malignancy vs poor po intake vs infection. Workup and mangement of infection as above. Poor PO intake likely 2/2 nausea in addition to lack of appetitie, likely 2/2 gi malignancy.     Problem/Plan - 4: Cancer of stomach  pt recently diagnosed with stomach and colon cancer 1 month ago. Has yet to begin treatment, has an appointment with Dr. Bolanos on 8/2/22.      Problem/Plan - 5: Stage 3 chronic kidney disease.   Pt with elevated Cr 1.48, as per chart review, patient with baseline of 1.4-1.6. Avoided nephrotoxic agents       Problem/Plan - 5: BPH  continue with tamsulosin 0.4mg daily. Reported no issues urinating       Problem/Plan - 7: Hypertension.   presented initially with BP of 150s, then downtrended to SBP 110s to 130s.  As per last DC note ,patient had been on lisinopril and amlodipine; however, patient unsure of medications. As per med rec done on 7/25, patient not on any hypertensives. Called both [facility] and [facility].       Problem/Plan - 8: DM (diabetes mellitus).   Patient with DM.  -continue mIss.    Patient was discharged to: home hospice    New medications: none    Changes to old medications: none    Medications that were stopped: none    Items to follow up as outpatient:  f/u Dr. Bolanos on 08/02    Physical exam at the time of discharge:    General: NAD, laying in bed comfortable  HEENT: NC/AT; EOMI, PERRLA, anicteric sclera; moist mucosal membranes.  Neck: supple, trachea midline  Cardiovascular: RRR, +S1/S2; NO M/R/G  Respiratory: CTA on left with dec breath sounds in R lower base, no wheezing  Gastrointestinal: soft, NT/ND; +BSx4  Extremities: WWP; no edema or cyanosis  Vascular: 2+ radial, DP/PT pulses B/L  Neurological: AAOx3; no focal deficits     #Discharge: do not delete    Patient is a 89 yo Kuwaiti speaking M with PMHx of recently diagnosed stomach/colon cancer (not on treatment), iron deficiency anemia, DM, HTN, HLD, DVT (not on Eliquis), claudication s/p RLE SFA stent, BPH presenting with weakness, admitted for sepsis secondary to PNA.     Hospital course (by problem):        Problem/Plan - 1: Sepsis 2/2 pneumonia  -p/w fever, tachycardic, and source likely PNA. Still awaiting UA, though patient currently not endorsing urinary symptoms. Denying any other constitutional sx such as abd pain, diarrhea, chills. Negative COVID and RVP. Urine strep negative, urine legionella negative, cultures were negative. CTX given for CAP.     Problem/Plan - 2: Pneumonia   pt p/sepsis and weakness, source likely 2/2 PNA. On xray right sided consolidation noted. Denying cough, SOB currently.  Has had no recent hospitilizations or recent abx use. Negative COVID and RVP. Urine strep negative, urine legionella negative  -Tx with CTX and Azithro for CAP coverage, d/c Azithro once legionella was r/o      Problem/Plan - 3: Pleural Effusion  malignant vs. infectious in nature. Pt found to have right lower pleural effusion. Pulm consulted and recommended  thoracentesis which was done Friday. Pt was placed on incentive spirometry     Problem/Plan - 4: Weakness   pt p/w 2 weeks of weakness and difficulty ambulating. Ddx includes malignancy vs poor po intake vs infection. Workup and management of infection as above. Poor PO intake likely 2/2 nausea in addition to lack of appetite, likely 2/2 gi malignancy.     Problem/Plan - 5: Cancer of stomach  pt recently diagnosed with stomach and colon cancer 1 month ago. Has yet to begin treatment, has an appointment with Dr. Bolanos on 8/2/22.      Problem/Plan - 6: Stage 3 chronic kidney disease.   Pt with elevated Cr 1.48, as per chart review, patient with baseline of 1.4-1.6. Avoided nephrotoxic agents       Problem/Plan - 7: BPH  continue with tamsulosin 0.4mg daily. Reported no issues urinating       Problem/Plan - 8: Hypertension.   presented initially with BP of 150s, then downtrended to SBP 110s to 130s.  As per last DC note ,patient had been on lisinopril and amlodipine; however, patient unsure of medications. As per med rec done on 7/25, patient not on any hypertensives. Called both [facility] and [facility].       Problem/Plan - 9: DM (diabetes mellitus).   Patient with DM.  -continue mIss.    Patient was discharged to: home hospice    New medications: none    Changes to old medications: none    Medications that were stopped: none    Items to follow up as outpatient:  f/u Dr. Bolanos on 08/02    Physical exam at the time of discharge:    General: NAD, laying in bed comfortable  HEENT: NC/AT; EOMI, PERRLA, anicteric sclera; moist mucosal membranes.  Neck: supple, trachea midline  Cardiovascular: RRR, +S1/S2; NO M/R/G  Respiratory: CTA on left with dec breath sounds in R lower base, no wheezing  Gastrointestinal: soft, NT/ND; +BSx4  Extremities: WWP; no edema or cyanosis  Vascular: 2+ radial, DP/PT pulses B/L  Neurological: AAOx3; no focal deficits     #Discharge: do not delete    Patient is a 89 yo [place]an speaking M with PMHx of recently diagnosed stomach/colon cancer (not on treatment), iron deficiency anemia, DM, HTN, HLD, DVT (not on Eliquis), claudication s/p RLE SFA stent, BPH presenting with weakness, admitted for sepsis secondary to PNA.     Hospital course (by problem):        Problem/Plan - 1: Sepsis 2/2 pneumonia  -p/w fever, tachycardic, and source likely PNA. Still awaiting UA, though patient currently not endorsing urinary symptoms. Denying any other constitutional sx such as abd pain, diarrhea, chills. Negative COVID and RVP. Urine strep negative, urine legionella negative, cultures were negative. CTX given for CAP.     Problem/Plan - 2: Gram negative Pneumonia   pt p/sepsis and weakness, source likely 2/2 PNA. On xray right sided consolidation noted. Denying cough, SOB currently.  Has had no recent hospitilizations or recent abx use. Negative COVID and RVP. Urine strep negative, urine legionella negative  -Tx with CTX and Azithro for CAP coverage, d/c Azithro once legionella was r/o      Problem/Plan - 3: Pleural Effusion  malignant vs. infectious in nature. Pt found to have right lower pleural effusion. Pulm consulted and recommended  thoracentesis which was done Friday. Pt was placed on incentive spirometry     Problem/Plan - 4: Weakness   pt p/w 2 weeks of weakness and difficulty ambulating. Ddx includes malignancy vs poor po intake vs infection. Workup and management of infection as above. Poor PO intake likely 2/2 nausea in addition to lack of appetite, likely 2/2 gi malignancy.     Problem/Plan - 5: Cancer of stomach  pt recently diagnosed with stomach and colon cancer 1 month ago. Has yet to begin treatment, has an appointment with Dr. Bolanos on 8/2/22.      Problem/Plan - 6: Stage 3 chronic kidney disease.   Pt with elevated Cr 1.48, as per chart review, patient with baseline of 1.4-1.6. Avoided nephrotoxic agents       Problem/Plan - 7: BPH  continue with tamsulosin 0.4mg daily. Reported no issues urinating       Problem/Plan - 8: Hypertension.   presented initially with BP of 150s, then downtrended to SBP 110s to 130s.  As per last DC note ,patient had been on lisinopril and amlodipine; however, patient unsure of medications. As per med rec done on 7/25, patient not on any hypertensives. Called both [facility] and [facility].       Problem/Plan - 9: Type 2 DM (diabetes mellitus) complicated by hyperglycemia  Patient with DM.  -continue mIss.     Problem/Plan - 10: Severe Protein Calorie Malnutrition  Evaluated by nutrition with the following recommendations:  1. Continue with current diet order and ONS (Ensure Enlive BID (350kcal, 20g protein per serving))  2. Encourage pt to meet nutritional needs as able   3. Encourage adherence to diet education (reinforce as able)     Patient was discharged to: home hospice    New medications: none    Changes to old medications: none    Medications that were stopped: none    Items to follow up as outpatient:  f/u Dr. Bolanos on 08/02    Physical exam at the time of discharge:    General: NAD, laying in bed comfortable  HEENT: NC/AT; EOMI, PERRLA, anicteric sclera; moist mucosal membranes.  Neck: supple, trachea midline  Cardiovascular: RRR, +S1/S2; NO M/R/G  Respiratory: CTA on left with dec breath sounds in R lower base, no wheezing  Gastrointestinal: soft, NT/ND; +BSx4  Extremities: WWP; no edema or cyanosis  Vascular: 2+ radial, DP/PT pulses B/L  Neurological: AAOx3; no focal deficits

## 2022-07-26 NOTE — PROGRESS NOTE ADULT - SUBJECTIVE AND OBJECTIVE BOX
Ozempic prescription OVERNIGHT EVENTS:  No AEO    SUBJECTIVE / INTERVAL HPI: Patient seen and examined at bedside.     VITAL SIGNS:  Vital Signs Last 24 Hrs  T(C): 36.8 (2022 05:52), Max: 38.3 (2022 10:41)  T(F): 98.3 (2022 05:52), Max: 100.9 (2022 10:41)  HR: 80 (2022 05:52) (76 - 117)  BP: 115/64 (2022 05:52) (104/64 - 182/69)  BP(mean): --  RR: 17 (2022 05:52) (16 - 19)  SpO2: 94% (2022 05:52) (94% - 97%)    Parameters below as of 2022 05:52  Patient On (Oxygen Delivery Method): room air        PHYSICAL EXAM:    General: WDWN  HEENT: NCAT; PERRL, anicteric sclera; MMM  Neck: supple, trachea midline  Cardiovascular: S1, S2 normal; RRR, no M/G/R  Respiratory: CTABL; no W/R/R  Gastrointestinal: soft, nontender, nondistended. bowel sounds present.  Skin: no ulcerations or visible rashes appreciated  Extremities: WWP; no edema, clubbing or cyanosis  Vascular: 2+ radial, DP/PT pulses B/L  Neurological: AAOx3; CN II-XII grossly intact; no focal deficits    MEDICATIONS:  MEDICATIONS  (STANDING):  azithromycin  IVPB 500 milliGRAM(s) IV Intermittent every 24 hours  cefTRIAXone   IVPB 1000 milliGRAM(s) IV Intermittent every 24 hours  dextrose 5%. 1000 milliLiter(s) (50 mL/Hr) IV Continuous <Continuous>  dextrose 50% Injectable 25 Gram(s) IV Push once  glucagon  Injectable 1 milliGRAM(s) IntraMuscular once  heparin   Injectable 5000 Unit(s) SubCutaneous every 8 hours  insulin lispro (ADMELOG) corrective regimen sliding scale   SubCutaneous Before meals and at bedtime  mirtazapine 15 milliGRAM(s) Oral at bedtime  tamsulosin 0.4 milliGRAM(s) Oral at bedtime    MEDICATIONS  (PRN):  acetaminophen     Tablet .. 650 milliGRAM(s) Oral every 6 hours PRN Temp greater or equal to 38C (100.4F), Mild Pain (1 - 3)  dextrose Oral Gel 15 Gram(s) Oral once PRN Blood Glucose LESS THAN 70 milliGRAM(s)/deciliter  ondansetron    Tablet 4 milliGRAM(s) Oral every 6 hours PRN Nausea      ALLERGIES:  Allergies    No Known Allergies    Intolerances        LABS:                        10.4   9.39  )-----------( 423      ( 2022 10:27 )             33.4         136  |  102  |  19  ----------------------------<  157<H>  4.6   |  23  |  1.48<H>    Ca    8.5      2022 10:27    TPro  7.0  /  Alb  3.0<L>  /  TBili  0.5  /  DBili  x   /  AST  21  /  ALT  10  /  AlkPhos  217<H>        Urinalysis Basic - ( 2022 23:53 )    Color: Yellow / Appearance: Clear / S.010 / pH: x  Gluc: x / Ketone: NEGATIVE  / Bili: Negative / Urobili: 0.2 E.U./dL   Blood: x / Protein: NEGATIVE mg/dL / Nitrite: NEGATIVE   Leuk Esterase: NEGATIVE / RBC: < 5 /HPF / WBC < 5 /HPF   Sq Epi: x / Non Sq Epi: 0-5 /HPF / Bacteria: Present /HPF      CAPILLARY BLOOD GLUCOSE      POCT Blood Glucose.: 190 mg/dL (2022 22:14)      RADIOLOGY & ADDITIONAL TESTS: Reviewed. OVERNIGHT EVENTS:  No AEO    SUBJECTIVE / INTERVAL HPI: Patient seen and examined at bedside.     CONSTITUTIONAL: No weakness, fevers or chills  EYES/ENT: No visual changes;  No vertigo or throat pain   NECK: No pain or stiffness  RESPIRATORY: Reports improvement in cough, minimal cough now, no wheezing, hemoptysis; No shortness of breath  CARDIOVASCULAR: No chest pain or palpitations  GASTROINTESTINAL: No abdominal or epigastric pain. No nausea, vomiting, or hematemesis; No diarrhea or constipation. No melena or hematochezia. Last BM yesterday.  GENITOURINARY: No dysuria, frequency or hematuria  NEUROLOGICAL: No numbness or weakness  SKIN: No itching, burning, rashes, or lesions   All other review of systems is negative unless indicated above.      VITAL SIGNS:  Vital Signs Last 24 Hrs  T(C): 36.8 (2022 05:52), Max: 38.3 (2022 10:41)  T(F): 98.3 (2022 05:52), Max: 100.9 (2022 10:41)  HR: 80 (2022 05:52) (76 - 117)  BP: 115/64 (2022 05:52) (104/64 - 182/69)  BP(mean): --  RR: 17 (2022 05:52) (16 - 19)  SpO2: 94% (2022 05:52) (94% - 97%)    Parameters below as of 2022 05:52  Patient On (Oxygen Delivery Method): room air        PHYSICAL EXAM:    General: laying down, Turkish speaking, in NAD, cooperative and interactive  HEENT: NCAT; PERRL, anicteric sclera; MMM  Neck: supple, trachea midline  Cardiovascular: S1, S2 normal; RRR, no M/G/R  Respiratory: CTABL; no W/R/R  Gastrointestinal: soft, nontender, mildly distended. bowel sounds present.   Skin: no ulcerations or visible rashes appreciated  Extremities: WWP; no edema, clubbing or cyanosis  Vascular: 2+ radial, DP/PT pulses B/L  Neurological: AAOx3; CN II-XII grossly intact; no focal deficits    MEDICATIONS:  MEDICATIONS  (STANDING):  azithromycin  IVPB 500 milliGRAM(s) IV Intermittent every 24 hours  cefTRIAXone   IVPB 1000 milliGRAM(s) IV Intermittent every 24 hours  dextrose 5%. 1000 milliLiter(s) (50 mL/Hr) IV Continuous <Continuous>  dextrose 50% Injectable 25 Gram(s) IV Push once  glucagon  Injectable 1 milliGRAM(s) IntraMuscular once  heparin   Injectable 5000 Unit(s) SubCutaneous every 8 hours  insulin lispro (ADMELOG) corrective regimen sliding scale   SubCutaneous Before meals and at bedtime  mirtazapine 15 milliGRAM(s) Oral at bedtime  tamsulosin 0.4 milliGRAM(s) Oral at bedtime    MEDICATIONS  (PRN):  acetaminophen     Tablet .. 650 milliGRAM(s) Oral every 6 hours PRN Temp greater or equal to 38C (100.4F), Mild Pain (1 - 3)  dextrose Oral Gel 15 Gram(s) Oral once PRN Blood Glucose LESS THAN 70 milliGRAM(s)/deciliter  ondansetron    Tablet 4 milliGRAM(s) Oral every 6 hours PRN Nausea      ALLERGIES:  Allergies    No Known Allergies    Intolerances        LABS:                        10.4   9.39  )-----------( 423      ( 2022 10:27 )             33.4     07-    136  |  102  |  19  ----------------------------<  157<H>  4.6   |  23  |  1.48<H>    Ca    8.5      2022 10:27    TPro  7.0  /  Alb  3.0<L>  /  TBili  0.5  /  DBili  x   /  AST  21  /  ALT  10  /  AlkPhos  217<H>        Urinalysis Basic - ( 2022 23:53 )    Color: Yellow / Appearance: Clear / S.010 / pH: x  Gluc: x / Ketone: NEGATIVE  / Bili: Negative / Urobili: 0.2 E.U./dL   Blood: x / Protein: NEGATIVE mg/dL / Nitrite: NEGATIVE   Leuk Esterase: NEGATIVE / RBC: < 5 /HPF / WBC < 5 /HPF   Sq Epi: x / Non Sq Epi: 0-5 /HPF / Bacteria: Present /HPF      CAPILLARY BLOOD GLUCOSE      POCT Blood Glucose.: 190 mg/dL (2022 22:14)      RADIOLOGY & ADDITIONAL TESTS: Reviewed.

## 2022-07-26 NOTE — DIETITIAN INITIAL EVALUATION ADULT - OTHER CALCULATIONS
%IBW: 101; ABW used to calculate estimated needs d/t pt being between 80 and 120% IBW. Adjusted for PCM and colon/stomach cancer. Fluids per team.

## 2022-07-26 NOTE — PROGRESS NOTE ADULT - PROBLEM SELECTOR PLAN 1
Presented with fever, tachycardic, and source likely PNA. Still awaiting UA, though patient currently not endorsing urinary symptoms. Denying any other constitutional sx such as abd pain, diarrhea, chills.   -follow blood and urine cultures  -follow urinanalysis  -s/p 2 L nacl  -treatment of pna as below Presented with fever, tachycardic, and source likely PNA. Still awaiting UA, though patient currently not endorsing urinary symptoms. Denying any other constitutional sx such as abd pain, diarrhea, chills.   -follow blood and urine cultures  -urine strep negative  -urine legionella f/u  -s/p 2 L nacl  -treatment of pna as below p/w fever, tachycardic, and source likely PNA. Still awaiting UA, though patient currently not endorsing urinary symptoms. Denying any other constitutional sx such as abd pain, diarrhea, chills. Negative COVID and RVP. Urine strep negative, urine legionella negative. Discontinued azithromycin.  -follow blood and urine cultures  -s/p 2 L nacl  -treatment of pna as below

## 2022-07-26 NOTE — DIETITIAN INITIAL EVALUATION ADULT - ADD RECOMMEND
1. Continue with current diet order   >>Texture per SLP   2. Encourage pt to meet nutritional needs as able   3. Encourage adherence to diet education (reinforce as able)   4. Pain and bowel regimen per team   5. Will continue to assess/honor preferences as able

## 2022-07-26 NOTE — DISCHARGE NOTE PROVIDER - DETAILS OF MALNUTRITION DIAGNOSIS/DIAGNOSES
This patient has been assessed with a concern for Malnutrition and was treated during this hospitalization for the following Nutrition diagnosis/diagnoses:     -  07/29/2022: Severe protein-calorie malnutrition

## 2022-07-26 NOTE — PROGRESS NOTE ADULT - ATTENDING COMMENTS
Patient was seen and examined at bedside on 7/26/2022 at 930 am. Patient with improvement of symptoms. Vitals, labwork and pertinent imaging reviewed - improving CPK, Cr. Physical exam - NAD, AAO x 4, PERRLA, HEENT, EOMI, MMM, supple neck, chest - CTA b/l, CV - rrr, s1s2, no m/r/g, abd - soft, NTND, + BS, ext -  wwp, RUE - incision - C/D/I, no evidence of compartment syndrome, psych - normal affect; skin - dry incision     Plan  -c/w strict i/o  -C/w gentle hydration IVF   -Trend CPK, Cr.  -Pt w/ improved N/V  -Pending d/c to [facility] Patient was seen and examined at bedside on 7/26/2022 at 930 am. Patient with improvement of symptoms. Vitals, labwork and pertinent imaging reviewed - CXR w/ evidence of R sided effusion. Physical exam - NAD, AAO x 4, PERRLA, HEENT, EOMI, MMM, supple neck, chest - decreased BS at R base, CV - rrr, s1s2, no m/r/g, abd - soft, NTND, + BS, ext -  wwp, psych - normal affect; skin - no rash     Plan  -C/w abx  -PT rec [facility] but family wants to take patient home  -Pulm consult for possible eval of effusion

## 2022-07-27 NOTE — PROGRESS NOTE ADULT - ATTENDING COMMENTS
Right pleff, concerning for malignant etiology. Thoracentesis on 7/29 after Plavix held x 5 days. Pending hospice transfer subsequently.

## 2022-07-27 NOTE — PROGRESS NOTE ADULT - PROBLEM SELECTOR PLAN 2
Patient presenting with sepsis and weakness, source likely 2/2 PNA. On xray right sided consolidation noted. Denying cough, SOB currently.  Has had no recent hospitalizations or recent abx use. Negative COVID and RVP. Urine strep negative. Negative legionella.    Plan:  -C/w Ctx for CAP coverage  -follow blood, sputum, cultures  -follow MRSA

## 2022-07-27 NOTE — PROGRESS NOTE ADULT - SUBJECTIVE AND OBJECTIVE BOX
PULMONARY CONSULT SERVICE FOLLOW-UP NOTE    INTERVAL HPI:  Reviewed chart and overnight events; patient seen and examined at bedside. No change since yesterday. No new complaints. Per patient's daughter, patient is taking plavix.     MEDICATIONS:  Pulmonary:    Antimicrobials:  cefTRIAXone   IVPB 1000 milliGRAM(s) IV Intermittent every 24 hours    Anticoagulants:  heparin   Injectable 5000 Unit(s) SubCutaneous every 8 hours    Cardiac:  tamsulosin 0.4 milliGRAM(s) Oral at bedtime      Allergies    No Known Allergies    Intolerances        Vital Signs Last 24 Hrs  T(C): 36.8 (2022 08:53), Max: 37.3 (2022 05:30)  T(F): 98.2 (2022 08:53), Max: 99.1 (2022 05:30)  HR: 81 (2022 08:53) (77 - 92)  BP: 127/70 (2022 08:53) (112/68 - 147/67)  BP(mean): --  RR: 18 (2022 08:53) (18 - 18)  SpO2: 98% (2022 08:53) (96% - 100%)    Parameters below as of 2022 08:53  Patient On (Oxygen Delivery Method): room air         @ 07:01  -   @ 07:00  --------------------------------------------------------  IN: 0 mL / OUT: 0 mL / NET: 0 mL    PHYSICAL EXAM:  Constitutional: NAD  Head: NC/AT  EENT: PERRL, anicteric sclera; oropharynx clear, MMM  Neck: supple, no appreciable JVD  Respiratory: CTA over left lung, bronchial breath sounds over right lung with decreased breath sounds at base; no W/R/R  Cardiovascular: +S1/S2, RRR  Gastrointestinal: soft, NT/ND  Extremities: WWP; no edema, clubbing or cyanosis  Vascular: 2+ radial pulses B/L  Neurological: awake and alert; VANG    LABS:  CBC Full  -  ( 2022 05:30 )  WBC Count : 6.89 K/uL  RBC Count : 3.54 M/uL  Hemoglobin : 9.7 g/dL  Hematocrit : 32.2 %  Platelet Count - Automated : 396 K/uL  Mean Cell Volume : 91.0 fl  Mean Cell Hemoglobin : 27.4 pg  Mean Cell Hemoglobin Concentration : 30.1 gm/dL  Auto Neutrophil # : 4.10 K/uL  Auto Lymphocyte # : 1.86 K/uL  Auto Monocyte # : 0.62 K/uL  Auto Eosinophil # : 0.23 K/uL  Auto Basophil # : 0.04 K/uL  Auto Neutrophil % : 59.5 %  Auto Lymphocyte % : 27.0 %  Auto Monocyte % : 9.0 %  Auto Eosinophil % : 3.3 %  Auto Basophil % : 0.6 %        137  |  106  |  15  ----------------------------<  185<H>  4.4   |  23  |  1.20    Ca    8.2<L>      2022 05:30  Phos  2.7       Mg     1.9         TPro  6.0  /  Alb  2.5<L>  /  TBili  0.2  /  DBili  x   /  AST  24  /  ALT  12  /  AlkPhos  178<H>            Urinalysis Basic - ( 2022 23:53 )    Color: Yellow / Appearance: Clear / S.010 / pH: x  Gluc: x / Ketone: NEGATIVE  / Bili: Negative / Urobili: 0.2 E.U./dL   Blood: x / Protein: NEGATIVE mg/dL / Nitrite: NEGATIVE   Leuk Esterase: NEGATIVE / RBC: < 5 /HPF / WBC < 5 /HPF   Sq Epi: x / Non Sq Epi: 0-5 /HPF / Bacteria: Present /HPF      RADIOLOGY & ADDITIONAL STUDIES:

## 2022-07-27 NOTE — PROGRESS NOTE ADULT - PROBLEM SELECTOR PLAN 3
Presenting with 2 weeks of weakness and difficulty ambulating. Ddx includes malignancy vs poor po intake vs infection.    Plan:  -workup and mangement of infection as above.  -f/u on TSH/b12/folate   -Poor PO intake likely 2/2 nausea in addition to lack of appetitie, likely 2/2 gi malignancy.  - ensure enlive BID

## 2022-07-27 NOTE — PROGRESS NOTE ADULT - PROBLEM SELECTOR PLAN 1
p/w fever, tachycardic, and source likely PNA. Still awaiting UA, though patient currently not endorsing urinary symptoms. Denying any other constitutional sx such as abd pain, diarrhea, chills. Negative COVID and RVP. Urine strep negative, urine legionella negative    Plan:  -follow blood; urine cultures stated unacceptable specimen, repeat after antibiotic resolution   -treatment of pna as below

## 2022-07-27 NOTE — PROGRESS NOTE ADULT - SUBJECTIVE AND OBJECTIVE BOX
LORNA RICHARDSON, 89y, Male  [id number]  Patient is a 89y old  Male who presents with a chief complaint of WEAKNESS PNEUMONIA ADULT FAILURE TO THRIVE     (2022 11:49)      OVERNIGHT EVENTS: Pt assessed at bedside with aid of  Pt denies any issues overnight, states he is feeling well. Patient denies fever/chills, nausea, vomiting, chest pain, palpitations, dyspnea, diarrhea, abdominal pain, dysuria.    SUBJECTIVE:    12 Point ROS Negative unless noted otherwise above.  -------------------------------------------------------------------------------  VITAL SIGNS:  Vital Signs Last 24 Hrs  T(C): 36.8 (2022 08:53), Max: 37.3 (2022 05:30)  T(F): 98.2 (2022 08:53), Max: 99.1 (2022 05:30)  HR: 81 (2022 08:53) (77 - 92)  BP: 127/70 (2022 08:53) (112/68 - 147/67)  BP(mean): --  RR: 18 (2022 08:53) (18 - 18)  SpO2: 98% (2022 08:53) (96% - 100%)    Parameters below as of 2022 08:53  Patient On (Oxygen Delivery Method): room air      I&O's Summary    2022 07:01  -  2022 07:00  --------------------------------------------------------  IN: 0 mL / OUT: 0 mL / NET: 0 mL        PHYSICAL EXAM:    General: NAD, well developed  HEENT: NC/AT; EOMI, PERRLA, anicteric sclera; moist mucosal membranes.  Neck: supple, trachea midline  Cardiovascular: RRR, +S1/S2; NO M/R/G  Respiratory: CTA on left with dec breath sounds in R lower base, bl expiratory wheeze  Gastrointestinal: soft, NT/ND; +BSx4  Extremities: WWP; no edema or cyanosis  Vascular: 2+ radial, DP/PT pulses B/L  Neurological: AAOx3; no focal deficits    ALLERGIES:  Allergies    No Known Allergies    Intolerances        MEDICATIONS:  MEDICATIONS  (STANDING):  atorvastatin 10 milliGRAM(s) Oral at bedtime  cefTRIAXone   IVPB 1000 milliGRAM(s) IV Intermittent every 24 hours  dextrose 5%. 1000 milliLiter(s) (50 mL/Hr) IV Continuous <Continuous>  dextrose 50% Injectable 25 Gram(s) IV Push once  glucagon  Injectable 1 milliGRAM(s) IntraMuscular once  heparin   Injectable 5000 Unit(s) SubCutaneous every 8 hours  insulin lispro (ADMELOG) corrective regimen sliding scale   SubCutaneous Before meals and at bedtime  mirtazapine 15 milliGRAM(s) Oral at bedtime  tamsulosin 0.4 milliGRAM(s) Oral at bedtime    MEDICATIONS  (PRN):  acetaminophen     Tablet .. 650 milliGRAM(s) Oral every 6 hours PRN Temp greater or equal to 38C (100.4F), Mild Pain (1 - 3)  dextrose Oral Gel 15 Gram(s) Oral once PRN Blood Glucose LESS THAN 70 milliGRAM(s)/deciliter  ondansetron    Tablet 4 milliGRAM(s) Oral every 6 hours PRN Nausea      -------------------------------------------------------------------------------  LABS:                        9.7    6.89  )-----------( 396      ( 2022 05:30 )             32.2     -    137  |  106  |  15  ----------------------------<  185<H>  4.4   |  23  |  1.20    Ca    8.2<L>      2022 05:30  Phos  2.7       Mg     1.9         TPro  6.0  /  Alb  2.5<L>  /  TBili  0.2  /  DBili  x   /  AST  24  /  ALT  12  /  AlkPhos  178<H>      LIVER FUNCTIONS - ( 2022 05:30 )  Alb: 2.5 g/dL / Pro: 6.0 g/dL / ALK PHOS: 178 U/L / ALT: 12 U/L / AST: 24 U/L / GGT: x             Urinalysis Basic - ( 2022 23:53 )    Color: Yellow / Appearance: Clear / S.010 / pH: x  Gluc: x / Ketone: NEGATIVE  / Bili: Negative / Urobili: 0.2 E.U./dL   Blood: x / Protein: NEGATIVE mg/dL / Nitrite: NEGATIVE   Leuk Esterase: NEGATIVE / RBC: < 5 /HPF / WBC < 5 /HPF   Sq Epi: x / Non Sq Epi: 0-5 /HPF / Bacteria: Present /HPF      CAPILLARY BLOOD GLUCOSE      POCT Blood Glucose.: 266 mg/dL (2022 12:28)      Culture - Blood (collected 2022 10:42)  Source: .Blood Blood  Preliminary Report (2022 12:00):    No growth at 2 days.    Culture - Blood (collected 2022 10:42)  Source: .Blood Blood  Preliminary Report (2022 12:00):    No growth at 2 days.      SARS-CoV-2: NotDetec (2022 13:50)  COVID-19 PCR: Negative (2022 10:27)  COVID-19 PCR: Negative (13 May 2022 09:58)      RADIOLOGY & ADDITIONAL TESTS: Reviewed.

## 2022-07-27 NOTE — PROGRESS NOTE ADULT - ATTENDING COMMENTS
Patient was seen and examined at bedside on 7/27/2022 at 930 am. Patient with improvement of symptoms. Vitals, labwork and pertinent imaging reviewed - CXR w/ evidence of R sided effusion. Physical exam - NAD, AAO x 4, PERRLA, HEENT, EOMI, MMM, supple neck, chest - decreased BS at R base, CV - rrr, s1s2, no m/r/g, abd - soft, NTND, + BS, ext -  wwp, psych - normal affect; skin - no rash     Plan  -C/w abx  -PT rec [facility] but family wants to take patient home  -Palliative consulted on 7/26 appreciate recs  -Pulm consult for possible eval of effusion - plan for thoracentesis on 7/29

## 2022-07-27 NOTE — PROGRESS NOTE ADULT - PROBLEM SELECTOR PLAN 4
Patient recently diagnosed with stomach and colon cancer 1 month ago. Has yet to begin treatment, has an appointment with Dr. Bolanos on 8/2/22, likely will not need. Per Dr. Bolanos pt saw their pallaitive team and dont want any treatment, Heme onc spoke to family and confirmed no further management needed. strict comfort care. Daughter is main proxy. Dispo home hospice.     Plan:  -continue to monitor  -f/u abd US

## 2022-07-27 NOTE — PROGRESS NOTE ADULT - PROBLEM SELECTOR PLAN 5
malignant vs. infectious in nature. Pt found to have right lower pleural effusion. Pulm consulted and recommended  thoracentesis which will have to wait till Friday due to pt being on plavix before being here.    Plan:  - f/u w/ pulm

## 2022-07-28 NOTE — PROGRESS NOTE ADULT - ATTENDING COMMENTS
Patient was seen and examined at bedside on 7/28/2022 at 930 am. Patient with improvement of symptoms. Vitals, labwork and pertinent imaging reviewed - CXR w/ evidence of R sided effusion. Physical exam - NAD, AAO x 4, PERRLA, HEENT, EOMI, MMM, supple neck, chest - decreased BS at R base, CV - rrr, s1s2, no m/r/g, abd - soft, NTND, + BS, ext -  wwp, psych - normal affect; skin - no rash     Plan  -C/w abx  -PT rec [facility] but family wants to take patient home  -Palliative consulted on 7/26 appreciate recs  -Pulm consult for possible eval of effusion - plan for thoracentesis on 7/29 and d/c after

## 2022-07-28 NOTE — CONSULT NOTE ADULT - SUBJECTIVE AND OBJECTIVE BOX
Patient is a 89y old  Male who presents with a chief complaint of       HPI:    Patient is a 89 yo Australian speaking M with PMHx of recently diagnosed stomach/colon cancer (not yet on tx, diagnosed 1 month ago, via biopsy at [facility], follows w Dr Bolanos) iron deficiency anemia, DM, HTN, HLD, DVT s/p treatment with eliquis, claudication s/p RLE SFA stent, BPH presenting with weakness for two weeks. Per daughter, has been experiencing weakness with ambulation for last 2-3 weeks, preventing him from walking and getting out of bed. As per daughter, patient has not been eating due to loss of appetite, resulting in a significant weight loss of roughly 20-30 lbs in the past 4 mos. Daughter endorses that patient has been having increased SOB when ambulating short distances, but denying any chest pain. Patient also endorsing nausea, but no vomiting. Denying fever/chills, URI symptoms, abd pain, diarrhea, dysuria, lightheadedness, CP, palpitations. Currently endorsing productive cough. No hx of sick contacts, no hx of travel, no le swelling.     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 1000 milliLiter(s) (50 mL/Hr) IV Continuous <Continuous>  dextrose 50% Injectable 25 Gram(s) IV Push once  glucagon  Injectable 1 milliGRAM(s) IntraMuscular once  heparin   Injectable 5000 Unit(s) SubCutaneous every 8 hours  insulin lispro (ADMELOG) corrective regimen sliding scale   SubCutaneous Before meals and at bedtime  mirtazapine 15 milliGRAM(s) Oral at bedtime  tamsulosin 0.4 milliGRAM(s) Oral at bedtime    MEDICATIONS  (PRN):  acetaminophen     Tablet .. 650 milliGRAM(s) Oral every 6 hours PRN Temp greater or equal to 38C (100.4F), Mild Pain (1 - 3)  dextrose Oral Gel 15 Gram(s) Oral once PRN Blood Glucose LESS THAN 70 milliGRAM(s)/deciliter  ondansetron    Tablet 4 milliGRAM(s) Oral every 6 hours PRN Nausea             FAMILY HISTORY:  FH: diabetes mellitus  mother      CBC Full  -  ( 2022 10:27 )  WBC Count : 9.39 K/uL  RBC Count : 3.68 M/uL  Hemoglobin : 10.4 g/dL  Hematocrit : 33.4 %  Platelet Count - Automated : 423 K/uL  Mean Cell Volume : 90.8 fl  Mean Cell Hemoglobin : 28.3 pg  Mean Cell Hemoglobin Concentration : 31.1 gm/dL  Auto Neutrophil # : 6.60 K/uL  Auto Lymphocyte # : 1.80 K/uL  Auto Monocyte # : 0.73 K/uL  Auto Eosinophil # : 0.14 K/uL  Auto Basophil # : 0.06 K/uL  Auto Neutrophil % : 70.3 %  Auto Lymphocyte % : 19.2 %  Auto Monocyte % : 7.8 %  Auto Eosinophil % : 1.5 %  Auto Basophil % : 0.6 %          136  |  102  |  19  ----------------------------<  157<H>  4.6   |  23  |  1.48<H>    Ca    8.5      2022 10:27    TPro  7.0  /  Alb  3.0<L>  /  TBili  0.5  /  DBili  x   /  AST  21  /  ALT  10  /  AlkPhos  217<H>        Urinalysis Basic - ( 2022 23:53 )    Color: Yellow / Appearance: Clear / S.010 / pH: x  Gluc: x / Ketone: NEGATIVE  / Bili: Negative / Urobili: 0.2 E.U./dL   Blood: x / Protein: NEGATIVE mg/dL / Nitrite: NEGATIVE   Leuk Esterase: NEGATIVE / RBC: < 5 /HPF / WBC < 5 /HPF   Sq Epi: x / Non Sq Epi: 0-5 /HPF / Bacteria: Present /HPF          Radiology :  < from: Xray Chest 1 View-PORTABLE IMMEDIATE (Xray Chest 1 View-PORTABLE IMMEDIATE .) (22 @ 12:35) >    ACC: [id number] EXAM:  XR CHEST PORTABLE IMMED 1V                          PROCEDURE DATE:  2022          INTERPRETATION:  CLINICAL HISTORY: weakness.    COMPARISON: 2022.    TECHNIQUE: Portable frontal chest radiograph. Adequate positioning.    FINDINGS:    Support devices: None.    Cardiac/mediastinum/hilum: Unremarkable.    Lung parenchyma/Pleura: Interval development of a right lung   opacity/effusion. No pneumothorax.    Skeleton/soft tissues: Stable.      IMPRESSION:    Interval development of a right lung confluent opacity/effusion. Consider   follow-up chest CT with IV contrast.                        Vital Signs Last 24 Hrs  T(C): 36.8 (2022 05:52), Max: 38.3 (2022 10:41)  T(F): 98.3 (2022 05:52), Max: 100.9 (2022 10:41)  HR: 80 (2022 05:52) (76 - 111)  BP: 115/64 (2022 05:52) (115/64 - 182/69)  BP(mean): --  RR: 17 (2022 05:52) (16 - 19)  SpO2: 94% (2022 05:52) (94% - 97%)    Parameters below as of 2022 05:52  Patient On (Oxygen Delivery Method): room air            REVIEW OF SYSTEMS:  per hpi         Physical Exam:   frail 89 yo  man lying in semi Dlegado's position , awake , alert , no acute complaints     Neurologic Exam:    Alert and oriented to person , speech fluent w/o dysarthria , follows commands         Motor Exam:    Right UE:               no focal weakness ,  > 3+/5 throughout                                 Left UE:                 no focal weakness,  > 3+/5 throughout            Right LE:                no focal weakness,  > 3+/5 throughout      Left LE:                  no focal weakness,  > 3+/5 throughout          Sensation:         intact to light touch x 4 extremities                       DTR :                     biceps/brachioradialis : equal                                              patella/ankle : equal       Gait :  not tested              PM&R Impression :     1) deconditioned    2) no focal weakness      Recommendations / Plan :     1) Physical / Occupational therapy focusing on therapeutic exercises , bed mobility/transfer out of bed evaluation , progressive ambulation with assistive       devices prn .    2) Anticipated Disposition Plan / Recs  :   pending functional progress , probable [facility]IO            
PULMONARY SERVICE INITIAL CONSULT NOTE    HPI:   ID #[id number]. Patient is a 89 yo M, never smoker, with PMHx of recently diagnosed stomach/colon cancer (not yet on tx, diagnosed 1 month ago, via biopsy at [facility], follows w Dr Bolanos) ALCON, DM, HTN, HLD, DVT s/p treatment with Eliquis, claudication s/p RLE SFA stent, BPH presenting with weakness for two weeks. Per his daughter, he has been experiencing weakness with ambulation for last 2-3 weeks, preventing him from walking and getting out of bed. He has not been eating due to loss of appetite, resulting in a significant weight loss of roughly 20-30 lbs in the past 4 months. Daughter endorses that patient has been having increased SOB when ambulating short distances. The patient himself denies any fever/chills, cough, phlegm, chests pain. He has no sick contacts. He denies any history of lung problems in the past. He is originally from [place], worked in a hospital for many years. Pulmonology consulted for a pleural effusion.     In the ED  T 99.2-100.9, , /64, RR 18 97%  Labs Hg 10.4, WBC 9.39, Plt 423, lactate 1.6, BUN 19, Cr 1.48, Alb 3.0, Alk Phos 217, Tro 0.01,   CXR demonstrating   Course: Ceftriaxone 1g, Azithro 500mg, Tylenol, 2L NaCl       (2022 13:00)      REVIEW OF SYSTEMS:  All additional ROS negative.    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      H/O hernia repair      Cataracts, both eyes          FAMILY HISTORY:  FH: diabetes mellitus  mother        SOCIAL HISTORY:  Smoking Status: [ ] Current, [ ] Former, [x ] Never  Pack Years:    MEDICATIONS:  Pulmonary:    Antimicrobials:  cefTRIAXone   IVPB 1000 milliGRAM(s) IV Intermittent every 24 hours    Anticoagulants:  heparin   Injectable 5000 Unit(s) SubCutaneous every 8 hours    Onc:    GI/:    Endocrine:  atorvastatin 10 milliGRAM(s) Oral at bedtime  dextrose 50% Injectable 25 Gram(s) IV Push once  dextrose Oral Gel 15 Gram(s) Oral once PRN  glucagon  Injectable 1 milliGRAM(s) IntraMuscular once  insulin lispro (ADMELOG) corrective regimen sliding scale   SubCutaneous Before meals and at bedtime    Cardiac:  tamsulosin 0.4 milliGRAM(s) Oral at bedtime    Other Medications:  acetaminophen     Tablet .. 650 milliGRAM(s) Oral every 6 hours PRN  dextrose 5%. 1000 milliLiter(s) IV Continuous <Continuous>  mirtazapine 15 milliGRAM(s) Oral at bedtime  ondansetron    Tablet 4 milliGRAM(s) Oral every 6 hours PRN      Allergies    No Known Allergies    Intolerances        Vital Signs Last 24 Hrs  T(C): 36.9 (2022 15:32), Max: 36.9 (2022 15:32)  T(F): 98.5 (2022 15:32), Max: 98.5 (2022 15:32)  HR: 77 (2022 15:32) (77 - 95)  BP: 147/67 (2022 15:32) (114/61 - 182/69)  BP(mean): --  RR: 18 (2022 15:32) (17 - 18)  SpO2: 97% (2022 15:32) (94% - 98%)    Parameters below as of 2022 15:32  Patient On (Oxygen Delivery Method): room air         @ 07:01  -   @ 19:08  --------------------------------------------------------  IN: 0 mL / OUT: 0 mL / NET: 0 mL          PHYSICAL EXAM:  Constitutional: NAD  Head: NC/AT  EENT: PERRL, anicteric sclera; oropharynx clear, MMM  Neck: supple, no appreciable JVD  Respiratory: CTA over left lung, bronchial breath sounds over right lung with decreased breath sounds at base; no W/R/R  Cardiovascular: +S1/S2, RRR  Gastrointestinal: soft, NT/ND  Extremities: WWP; no edema, clubbing or cyanosis  Vascular: 2+ radial pulses B/L  Neurological: awake and alert; VANG    LABS:  CBC Full  -  ( 2022 10:27 )  WBC Count : 9.39 K/uL  RBC Count : 3.68 M/uL  Hemoglobin : 10.4 g/dL  Hematocrit : 33.4 %  Platelet Count - Automated : 423 K/uL  Mean Cell Volume : 90.8 fl  Mean Cell Hemoglobin : 28.3 pg  Mean Cell Hemoglobin Concentration : 31.1 gm/dL  Auto Neutrophil # : 6.60 K/uL  Auto Lymphocyte # : 1.80 K/uL  Auto Monocyte # : 0.73 K/uL  Auto Eosinophil # : 0.14 K/uL  Auto Basophil # : 0.06 K/uL  Auto Neutrophil % : 70.3 %  Auto Lymphocyte % : 19.2 %  Auto Monocyte % : 7.8 %  Auto Eosinophil % : 1.5 %  Auto Basophil % : 0.6 %        136  |  102  |  19  ----------------------------<  157<H>  4.6   |  23  |  1.48<H>    Ca    8.5      2022 10:27    TPro  7.0  /  Alb  3.0<L>  /  TBili  0.5  /  DBili  x   /  AST  21  /  ALT  10  /  AlkPhos  217<H>      Urinalysis Basic - ( 2022 23:53 )  Color: Yellow / Appearance: Clear / S.010 / pH: x  Gluc: x / Ketone: NEGATIVE  / Bili: Negative / Urobili: 0.2 E.U./dL   Blood: x / Protein: NEGATIVE mg/dL / Nitrite: NEGATIVE   Leuk Esterase: NEGATIVE / RBC: < 5 /HPF / WBC < 5 /HPF   Sq Epi: x / Non Sq Epi: 0-5 /HPF / Bacteria: Present /HPF    RADIOLOGY & ADDITIONAL STUDIES:  CXR 22: right lung opacity with right pleural effusion 
Admitted with LE weakness and IVEY. Found to have pleural effusion.  Reports symptoms have all resolved and feels back to his baseline today.    CONSTITUTIONAL: No weakness, fevers or chills  RESPIRATORY: improvement in cough, minimal cough now, no wheezing, hemoptysis; No shortness of breath at rest.  CARDIOVASCULAR: No chest pain or palpitations  GASTROINTESTINAL: No abdominal or epigastric pain. No nausea, vomiting, or hematemesis; No diarrhea or constipation. No melena or hematochezia. Last BM yesterday.      PHYSICAL EXAM:    General: laying down, Bengali speaking, in NAD, cooperative and interactive  Cardiovascular: S1, S2 normal; RRR, no M/G/R  Respiratory: CTABL; no W/R/R  Gastrointestinal: soft, nontender, mildly distended. bowel sounds present.     MEDICATIONS:  MEDICATIONS  (STANDING):  azithromycin  IVPB 500 milliGRAM(s) IV Intermittent every 24 hours  cefTRIAXone   IVPB 1000 milliGRAM(s) IV Intermittent every 24 hours  dextrose 5%. 1000 milliLiter(s) (50 mL/Hr) IV Continuous <Continuous>  dextrose 50% Injectable 25 Gram(s) IV Push once  glucagon  Injectable 1 milliGRAM(s) IntraMuscular once  heparin   Injectable 5000 Unit(s) SubCutaneous every 8 hours  insulin lispro (ADMELOG) corrective regimen sliding scale   SubCutaneous Before meals and at bedtime  mirtazapine 15 milliGRAM(s) Oral at bedtime  tamsulosin 0.4 milliGRAM(s) Oral at bedtime    MEDICATIONS  (PRN):  acetaminophen     Tablet .. 650 milliGRAM(s) Oral every 6 hours PRN Temp greater or equal to 38C (100.4F), Mild Pain (1 - 3)  dextrose Oral Gel 15 Gram(s) Oral once PRN Blood Glucose LESS THAN 70 milliGRAM(s)/deciliter  ondansetron    Tablet 4 milliGRAM(s) Oral every 6 hours PRN Nausea      ALLERGIES:  Allergies    No Known Allergies    Intolerances        LABS:                                   9.7    7.29  )-----------( 387      ( 28 Jul 2022 07:16 )             31.2       138  |  105  |  14  ----------------------------<  162<H>  4.5   |  23  |  1.16    Ca    8.3<L>      28 Jul 2022 07:16  Phos  2.9     07-28  Mg     2.3     07-28    TPro  6.1  /  Alb  2.4<L>  /  TBili  0.2  /  DBili  x   /  AST  28  /  ALT  14  /  AlkPhos  191<H>  07-28        RADIOLOGY & ADDITIONAL TESTS: Reviewed.

## 2022-07-28 NOTE — CONSULT NOTE ADULT - CONSULT REQUESTED BY NAME
Dr. Oro
Dr. Oro
house staff
Prior to initial admission Pt lives alone in condo, no stairs to negotiate, fully independent without AD. During previous hospital stay, pt able to ambulate short distances with RW min A, was D/C to subacute rehab.

## 2022-07-28 NOTE — PROGRESS NOTE ADULT - PROBLEM SELECTOR PLAN 3
Presenting with 2 weeks of weakness and difficulty ambulating. Ddx includes malignancy vs poor po intake vs infection.    Plan:  -workup and management of infection as above.  -Poor PO intake likely 2/2 nausea in addition to lack of appetitie, likely 2/2 gi malignancy.  - ensure enlive BID malignant vs. infectious in nature. Pt found to have right lower pleural effusion. Pulm consulted and recommended  thoracentesis which will have to wait till Friday due to pt being on plavix before being here.    Plan:  - Thoracentesis planned for tomorrow, plavix being held  - incentive spirometry

## 2022-07-28 NOTE — PROGRESS NOTE ADULT - PROBLEM SELECTOR PLAN 4
Patient recently diagnosed with stomach and colon cancer 1 month ago. Has yet to begin treatment, has an appointment with Dr. Bolanos on 8/2/22. Per Dr. Bolanos pt saw their pallaitive team and dont want any treatment, Heme onc spoke to family and confirmed no further management needed, f/u out pt. Dispo home hospice.     Plan:  - continue to monitor  - abd US shows No hepatic metastasis. Small volume ascites. Cholelithiasis. Presenting with 2 weeks of weakness and difficulty ambulating. Ddx includes malignancy vs poor po intake vs infection.    Plan:  -workup and management of infection as above.  -Poor PO intake likely 2/2 nausea in addition to lack of appetitie, likely 2/2 gi malignancy.  - ensure enlive BID

## 2022-07-28 NOTE — PROGRESS NOTE ADULT - PROBLEM SELECTOR PLAN 4
- Patient with advanced colon cancer, pleural effusion, awaiting palliative thoracentesis.  - After discussion with patient, wife and daughter, all are in agreement with home hospice services.   - Code status discussion initiated.   - Patient states that if he were to die, he would want to die naturally without machines.   - However, he wants to discuss with the remainder of his family.  - MOLST to be clarified tomorrow after patient has a chance to speak with everyone.

## 2022-07-28 NOTE — PROGRESS NOTE ADULT - SUBJECTIVE AND OBJECTIVE BOX
DYLANLORNA, 89y, Male  [id number]  Patient is a 89y old  Male who presents with a chief complaint of SOB (28 Jul 2022 12:46)      OVERNIGHT EVENTS:     SUBJECTIVE:    12 Point ROS Negative unless noted otherwise above.  -------------------------------------------------------------------------------  VITAL SIGNS:  Vital Signs Last 24 Hrs  T(C): 36.7 (28 Jul 2022 08:23), Max: 36.8 (27 Jul 2022 15:45)  T(F): 98.1 (28 Jul 2022 08:23), Max: 98.2 (27 Jul 2022 15:45)  HR: 79 (28 Jul 2022 08:23) (75 - 109)  BP: 121/71 (28 Jul 2022 08:23) (108/63 - 150/67)  BP(mean): --  RR: 18 (28 Jul 2022 08:23) (18 - 18)  SpO2: 97% (28 Jul 2022 08:23) (96% - 98%)    Parameters below as of 28 Jul 2022 08:23  Patient On (Oxygen Delivery Method): room air      I&O's Summary      PHYSICAL EXAM:    General: NAD, well developed  HEENT: NC/AT; EOMI, PERRLA, anicteric sclera; moist mucosal membranes.  Neck: supple, trachea midline  Cardiovascular: RRR, +S1/S2; NO M/R/G  Respiratory: CTA B/L; no W/R/R  Gastrointestinal: soft, NT/ND; +BSx4  Extremities: WWP; no edema or cyanosis  Vascular: 2+ radial, DP/PT pulses B/L  Neurological: AAOx3; no focal deficits    ALLERGIES:  Allergies    No Known Allergies    Intolerances        MEDICATIONS:  MEDICATIONS  (STANDING):  atorvastatin 10 milliGRAM(s) Oral at bedtime  cefTRIAXone   IVPB 1000 milliGRAM(s) IV Intermittent every 24 hours  dextrose 5%. 1000 milliLiter(s) (50 mL/Hr) IV Continuous <Continuous>  dextrose 50% Injectable 25 Gram(s) IV Push once  glucagon  Injectable 1 milliGRAM(s) IntraMuscular once  heparin   Injectable 5000 Unit(s) SubCutaneous every 8 hours  insulin lispro (ADMELOG) corrective regimen sliding scale   SubCutaneous Before meals and at bedtime  mirtazapine 15 milliGRAM(s) Oral at bedtime  tamsulosin 0.4 milliGRAM(s) Oral at bedtime    MEDICATIONS  (PRN):  acetaminophen     Tablet .. 650 milliGRAM(s) Oral every 6 hours PRN Temp greater or equal to 38C (100.4F), Mild Pain (1 - 3)  dextrose Oral Gel 15 Gram(s) Oral once PRN Blood Glucose LESS THAN 70 milliGRAM(s)/deciliter  ondansetron    Tablet 4 milliGRAM(s) Oral every 6 hours PRN Nausea      -------------------------------------------------------------------------------  LABS:                        9.7    7.29  )-----------( 387      ( 28 Jul 2022 07:16 )             31.2     07-28    138  |  105  |  14  ----------------------------<  162<H>  4.5   |  23  |  1.16    Ca    8.3<L>      28 Jul 2022 07:16  Phos  2.9     07-28  Mg     2.3     07-28    TPro  6.1  /  Alb  2.4<L>  /  TBili  0.2  /  DBili  x   /  AST  28  /  ALT  14  /  AlkPhos  191<H>  07-28    LIVER FUNCTIONS - ( 28 Jul 2022 07:16 )  Alb: 2.4 g/dL / Pro: 6.1 g/dL / ALK PHOS: 191 U/L / ALT: 14 U/L / AST: 28 U/L / GGT: x               CAPILLARY BLOOD GLUCOSE      POCT Blood Glucose.: 217 mg/dL (28 Jul 2022 12:21)      SARS-CoV-2: NotDetec (25 Jul 2022 13:50)  COVID-19 PCR: Negative (25 Jul 2022 10:27)  COVID-19 PCR: Negative (13 May 2022 09:58)      RADIOLOGY & ADDITIONAL TESTS: Reviewed.       LORNA RICHARDSON, 89y, Male  [id number]  Patient is a 89y old  Male who presents with a chief complaint of SOB (28 Jul 2022 12:46)      OVERNIGHT EVENTS: No issues overnight    SUBJECTIVE: Pt assessed at bedside, denies any new complaints. Pt states he is feeling well and asked about using incentive spirometry at bedside. Patient denies fever/chills, nausea, vomiting, chest pain, dyspnea, cough, abdominal pain, dysuria.    12 Point ROS Negative unless noted otherwise above.  -------------------------------------------------------------------------------  VITAL SIGNS:  Vital Signs Last 24 Hrs  T(C): 36.7 (28 Jul 2022 08:23), Max: 36.8 (27 Jul 2022 15:45)  T(F): 98.1 (28 Jul 2022 08:23), Max: 98.2 (27 Jul 2022 15:45)  HR: 79 (28 Jul 2022 08:23) (75 - 109)  BP: 121/71 (28 Jul 2022 08:23) (108/63 - 150/67)  BP(mean): --  RR: 18 (28 Jul 2022 08:23) (18 - 18)  SpO2: 97% (28 Jul 2022 08:23) (96% - 98%)    Parameters below as of 28 Jul 2022 08:23  Patient On (Oxygen Delivery Method): room air      I&O's Summary      PHYSICAL EXAM:    General: NAD, laying in bed comfortable  HEENT: NC/AT; EOMI, PERRLA, anicteric sclera; moist mucosal membranes.  Neck: supple, trachea midline  Cardiovascular: RRR, +S1/S2; NO M/R/G  Respiratory: CTA on left with dec breath sounds in R lower base, no wheezing  Gastrointestinal: soft, NT/ND; +BSx4  Extremities: WWP; no edema or cyanosis  Vascular: 2+ radial, DP/PT pulses B/L  Neurological: AAOx3; no focal deficits    ALLERGIES:  Allergies    No Known Allergies    Intolerances        MEDICATIONS:  MEDICATIONS  (STANDING):  atorvastatin 10 milliGRAM(s) Oral at bedtime  cefTRIAXone   IVPB 1000 milliGRAM(s) IV Intermittent every 24 hours  dextrose 5%. 1000 milliLiter(s) (50 mL/Hr) IV Continuous <Continuous>  dextrose 50% Injectable 25 Gram(s) IV Push once  glucagon  Injectable 1 milliGRAM(s) IntraMuscular once  heparin   Injectable 5000 Unit(s) SubCutaneous every 8 hours  insulin lispro (ADMELOG) corrective regimen sliding scale   SubCutaneous Before meals and at bedtime  mirtazapine 15 milliGRAM(s) Oral at bedtime  tamsulosin 0.4 milliGRAM(s) Oral at bedtime    MEDICATIONS  (PRN):  acetaminophen     Tablet .. 650 milliGRAM(s) Oral every 6 hours PRN Temp greater or equal to 38C (100.4F), Mild Pain (1 - 3)  dextrose Oral Gel 15 Gram(s) Oral once PRN Blood Glucose LESS THAN 70 milliGRAM(s)/deciliter  ondansetron    Tablet 4 milliGRAM(s) Oral every 6 hours PRN Nausea      -------------------------------------------------------------------------------  LABS:                        9.7    7.29  )-----------( 387      ( 28 Jul 2022 07:16 )             31.2     07-28    138  |  105  |  14  ----------------------------<  162<H>  4.5   |  23  |  1.16    Ca    8.3<L>      28 Jul 2022 07:16  Phos  2.9     07-28  Mg     2.3     07-28    TPro  6.1  /  Alb  2.4<L>  /  TBili  0.2  /  DBili  x   /  AST  28  /  ALT  14  /  AlkPhos  191<H>  07-28    LIVER FUNCTIONS - ( 28 Jul 2022 07:16 )  Alb: 2.4 g/dL / Pro: 6.1 g/dL / ALK PHOS: 191 U/L / ALT: 14 U/L / AST: 28 U/L / GGT: x               CAPILLARY BLOOD GLUCOSE      POCT Blood Glucose.: 217 mg/dL (28 Jul 2022 12:21)      SARS-CoV-2: NotDetec (25 Jul 2022 13:50)  COVID-19 PCR: Negative (25 Jul 2022 10:27)  COVID-19 PCR: Negative (13 May 2022 09:58)      RADIOLOGY & ADDITIONAL TESTS: Reviewed.

## 2022-07-28 NOTE — PROGRESS NOTE ADULT - PROBLEM SELECTOR PLAN 5
malignant vs. infectious in nature. Pt found to have right lower pleural effusion. Pulm consulted and recommended  thoracentesis which will have to wait till Friday due to pt being on plavix before being here.    Plan:  - Thoracentesis planned for tomorrow, plavix being held  - incentive spirometry Patient recently diagnosed with stomach and colon cancer 1 month ago. Has yet to begin treatment, has an appointment with Dr. Bolanos on 8/2/22. Per Dr. Bolanos pt saw their pallaitive team and dont want any treatment, Heme onc spoke to family and confirmed no further management needed, f/u out pt. Dispo home hospice.     Plan:  - continue to monitor  - abd US shows No hepatic metastasis. Small volume ascites. Cholelithiasis.

## 2022-07-28 NOTE — PROGRESS NOTE ADULT - PROBLEM SELECTOR PLAN 2
Patient presenting with sepsis and weakness, source likely 2/2 PNA. On xray right sided consolidation noted. Denying cough, SOB currently.  Has had no recent hospitalizations or recent abx use. Negative COVID and RVP. Urine strep negative. Negative legionella.    Plan:  -C/w Ctx until 7/30 for CAP coverage  -follow blood cultures

## 2022-07-28 NOTE — PROGRESS NOTE ADULT - SUBJECTIVE AND OBJECTIVE BOX
SUBJECTIVE AND OBJECTIVE:  Feels tired.  Intermittent dyspnea on exertion.    INTERVAL HPI/OVERNIGHT EVENTS:  None  DNR on chart:   Allergies    No Known Allergies    Intolerances    MEDICATIONS  (STANDING):  atorvastatin 10 milliGRAM(s) Oral at bedtime  cefTRIAXone   IVPB 1000 milliGRAM(s) IV Intermittent every 24 hours  dextrose 5%. 1000 milliLiter(s) (50 mL/Hr) IV Continuous <Continuous>  dextrose 50% Injectable 25 Gram(s) IV Push once  glucagon  Injectable 1 milliGRAM(s) IntraMuscular once  heparin   Injectable 5000 Unit(s) SubCutaneous every 8 hours  insulin lispro (ADMELOG) corrective regimen sliding scale   SubCutaneous Before meals and at bedtime  mirtazapine 15 milliGRAM(s) Oral at bedtime  tamsulosin 0.4 milliGRAM(s) Oral at bedtime    MEDICATIONS  (PRN):  acetaminophen     Tablet .. 650 milliGRAM(s) Oral every 6 hours PRN Temp greater or equal to 38C (100.4F), Mild Pain (1 - 3)  dextrose Oral Gel 15 Gram(s) Oral once PRN Blood Glucose LESS THAN 70 milliGRAM(s)/deciliter  ondansetron    Tablet 4 milliGRAM(s) Oral every 6 hours PRN Nausea      ITEMS UNCHECKED ARE NOT PRESENT    PRESENT SYMPTOMS: [ ]Unable to obtain due to poor mentation   Source if other than patient:  [ ]Family   [ ]Team     Pain (Impact on QOL):  None  Location:  Minimal acceptable level (0-10 scale):                   Aggravating factors:  Quality:  Radiation:  Severity (0-10 scale):    Timing:    Dyspnea:                           [ ]Mild [x ]Moderate [ ]Severe  Anxiety:                             [x ]Mild [ ]Moderate [ ]Severe  Fatigue:                             [ ]Mild [ ]Moderate [ x]Severe  Nausea:                             [ x]Mild [ ]Moderate [ ]Severe  Loss of appetite:              [ ]Mild [ ]Moderate [x ]Severe  Constipation:                    [ ]Mild [ ]Moderate [ ]Severe  Grief Present                    [ ] Yes  [ ] No   PAIN AD Score:	  http://geriatrictoolkit.[employer].edu/cog/painad.pdf (Ctrl + left click to view)    Other Symptoms:  [ x]All other review of systems negative     Karnofsky Performance Score/Palliative Performance Status Version 2:    40     %    http://palliative.info/resource_material/PPSv2.pdf  PHYSICAL EXAM:  Vital Signs Last 24 Hrs  T(C): 36.7 (28 Jul 2022 08:23), Max: 36.8 (27 Jul 2022 15:45)  T(F): 98.1 (28 Jul 2022 08:23), Max: 98.2 (27 Jul 2022 15:45)  HR: 79 (28 Jul 2022 08:23) (75 - 109)  BP: 121/71 (28 Jul 2022 08:23) (108/63 - 150/67)  BP(mean): --  RR: 18 (28 Jul 2022 08:23) (18 - 18)  SpO2: 97% (28 Jul 2022 08:23) (96% - 98%)    Parameters below as of 28 Jul 2022 08:23  Patient On (Oxygen Delivery Method): room air     I&O's Summary   GENERAL:  [x]Alert  [x ]Oriented x  3 [ ]Lethargic  [ ]Cachexia  [ ]Unarousable  [ ]Verbal  [ ]Non-Verbal  Behavioral:   [ ] Anxiety  [ ] Delirium [ ] Agitation [ x] Other  HEENT:  [ ]Normal   [x ]Dry mouth   [ ]ET Tube/Trach  [ ]Oral lesions  PULMONARY:   [ ]Clear [ ]Tachypnea  [ ]Audible excessive secretions   [ ]Rhonchi        [ ]Right [ ]Left [ ]Bilateral  [ x]Crackles        [ ]Right [ ]Left [x ]Bilateral  [ ]Wheezing     [ ]Right [ ]Left [ ]Bilateral  CARDIOVASCULAR:    [ ]Regular [ ]Irregular [ ]Tachy  [ ]Arturo [ ]Murmur [ ]Other  GASTROINTESTINAL:  [x ]Soft  [ ]Distended   [ x]+BS  [x ]Non tender [ ]Tender  [ ]PEG [ ]OGT/ NGT   Last BM:  GENITOURINARY:  [ ]Normal [ ] Incontinent   [ ]Oliguria/Anuria   [ ]Vega  MUSCULOSKELETAL:   [ ]Normal   [ x]Weakness  [ ]Bed/Wheelchair bound [ ]Edema  NEUROLOGIC:   [x ]No focal deficits  [ ] Cognitive impairment  [ ] Dysphagia [ ]Dysarthria [ ] Paresis [ ]Other   SKIN:   [ xNormal   [ ]Pressure ulcer(s)  [ ]Rash    CRITICAL CARE:  [ ] Shock Present  [ ]Septic [ ]Cardiogenic [ ]Neurologic [ ]Hypovolemic  [ ]  Vasopressors [ ]  Inotropes   [ ] Respiratory failure present  [ ] Acute  [ ] Chronic [ ] Hypoxic  [ ] Hypercarbic [ ] Other  [ ] Other organ failure     LABS:                        9.7    7.29  )-----------( 387      ( 28 Jul 2022 07:16 )             31.2   07-28    138  |  105  |  14  ----------------------------<  162<H>  4.5   |  23  |  1.16    Ca    8.3<L>      28 Jul 2022 07:16  Phos  2.9     07-28  Mg     2.3     07-28    TPro  6.1  /  Alb  2.4<L>  /  TBili  0.2  /  DBili  x   /  AST  28  /  ALT  14  /  AlkPhos  191<H>  07-28        RADIOLOGY & ADDITIONAL STUDIES:    Protein Calorie Malnutrition Present: [ ] yes [ ] no  [ ] PPSV2 < or = 30%  [ ] significant weight loss [ ] poor nutritional intake [ ] anasarca [ ] catabolic state Artificial Nutrition [ ]     REFERRALS:   [ ]Chaplaincy  [ ] Hospice  [ ]Child Life  [ ]Social Work  [ ]Case management [ ]Holistic Therapy   Goals of Care Document:

## 2022-07-28 NOTE — PROGRESS NOTE ADULT - PROBLEM SELECTOR PLAN 8
F-s/p 2L nacl  E-replete PRN  N-puree  DVT-heparin SQ  CODE FULL
Patient with DM.  -continue mIss
Patient with DM.  -continue mIss

## 2022-07-28 NOTE — PROGRESS NOTE ADULT - PROBLEM SELECTOR PLAN 1
p/w fever, tachycardic, and source likely PNA. Patient currently not endorsing urinary symptoms. Denying any other constitutional sx such as abd pain, diarrhea, chills. Negative COVID and RVP. Urine strep negative, urine legionella negative    Plan:  -follow blood cultures  -treatment of pna as below

## 2022-07-28 NOTE — PROGRESS NOTE ADULT - PROBLEM SELECTOR PLAN 9
F-s/p 2L nacl  E-replete PRN  N-puree  DVT-heparin SQ  CODE FULL
F-s/p 2L nacl  E-replete PRN  N-puree  DVT-heparin SQ  CODE FULL

## 2022-07-28 NOTE — PROGRESS NOTE ADULT - SUBJECTIVE AND OBJECTIVE BOX
PULMONARY CONSULT SERVICE FOLLOW-UP NOTE    INTERVAL HPI:  Reviewed chart and overnight events; patient seen and examined at bedside. Patient states he feels well overall. Complains of loud noise due to hospital construction. No medical complaints.     MEDICATIONS:  Pulmonary:    Antimicrobials:  cefTRIAXone   IVPB 1000 milliGRAM(s) IV Intermittent every 24 hours    Anticoagulants:  heparin   Injectable 5000 Unit(s) SubCutaneous every 8 hours    Cardiac:  tamsulosin 0.4 milliGRAM(s) Oral at bedtime      Allergies    No Known Allergies    Intolerances        Vital Signs Last 24 Hrs  T(C): 36.7 (28 Jul 2022 08:23), Max: 36.8 (27 Jul 2022 15:45)  T(F): 98.1 (28 Jul 2022 08:23), Max: 98.2 (27 Jul 2022 15:45)  HR: 79 (28 Jul 2022 08:23) (75 - 109)  BP: 121/71 (28 Jul 2022 08:23) (108/63 - 150/67)  BP(mean): --  RR: 18 (28 Jul 2022 08:23) (18 - 18)  SpO2: 97% (28 Jul 2022 08:23) (96% - 98%)    Parameters below as of 28 Jul 2022 08:23  Patient On (Oxygen Delivery Method): room air    PHYSICAL EXAM:  Constitutional: NAD  HEENT: NC/AT; PERRL, anicteric sclera; MMM  Neck: supple  Cardiovascular: +S1/S2, RRR  Respiratory: Decreased breath sounds over right lung field, L lung CTA  Gastrointestinal: soft, NT/ND  Extremities: WWP; no edema, clubbing or cyanosis  Vascular: 2+ radial pulses B/L  Neurological: awake and alert; VANG    LABS:  CBC Full  -  ( 28 Jul 2022 07:16 )  WBC Count : 7.29 K/uL  RBC Count : 3.43 M/uL  Hemoglobin : 9.7 g/dL  Hematocrit : 31.2 %  Platelet Count - Automated : 387 K/uL  Mean Cell Volume : 91.0 fl  Mean Cell Hemoglobin : 28.3 pg  Mean Cell Hemoglobin Concentration : 31.1 gm/dL  Auto Neutrophil # : 4.52 K/uL  Auto Lymphocyte # : 1.90 K/uL  Auto Monocyte # : 0.56 K/uL  Auto Eosinophil # : 0.24 K/uL  Auto Basophil # : 0.04 K/uL  Auto Neutrophil % : 62.0 %  Auto Lymphocyte % : 26.1 %  Auto Monocyte % : 7.7 %  Auto Eosinophil % : 3.3 %  Auto Basophil % : 0.5 %    07-28    138  |  105  |  14  ----------------------------<  162<H>  4.5   |  23  |  1.16    Ca    8.3<L>      28 Jul 2022 07:16  Phos  2.9     07-28  Mg     2.3     07-28    TPro  6.1  /  Alb  2.4<L>  /  TBili  0.2  /  DBili  x   /  AST  28  /  ALT  14  /  AlkPhos  191<H>  07-28    RADIOLOGY & ADDITIONAL STUDIES:

## 2022-07-28 NOTE — PROGRESS NOTE ADULT - PROBLEM SELECTOR PLAN 6
Patient presented initially with BP of 150s, then downtrended to SBP 110s to 130s.  As per last DC note ,patient had been on lisinopril and amlodipine; however, patient unsure of medications. As per med rec done on 7/25, patient not on any hypertensives. Called both [facility] and [facility].  -monitor BP
Pt with elevated Cr 1.48, as per chart review, patient with baseline of 1.4-1.6.   -continue to monitor Cr.   -will avoid nephrotoxic agents    #BPH  -continue with tamsulosin 0.4mg daily  -will bladder scan q6
Pt with elevated Cr 1.48, as per chart review, patient with baseline of 1.4-1.6.   -continue to monitor Cr - currently 1.16  -will avoid nephrotoxic agents    #BPH  -continue with tamsulosin 0.4mg daily  -will bladder scan q6

## 2022-07-28 NOTE — CONSULT NOTE ADULT - ASSESSMENT
89 yo M with recently diagnosed colon cancer (diagnosed at [facility]) DVT s/p treatment with Eliquis.  Patient was seen in office lastly 6/7/2022 and was not interested in any therapy at that time.  Was referred to palliative care for discussion regarding hospice care.    Admitted with LE weakness and IVEY. Found to have pleural effusion, likely malignant.  Symptoms have improved significantly since admission. Overall feels well today.  -labs today all stable, recommend transfusion for Hgb <7.5.  -awaiting thoracentesis by pulmonary tomorrow.  -will re-address treatment with patient and daughter as outpatient.  -follow up with me in office next week.  -recommend palliative care and hospice placement if remains uninterested in treatment.      
Patient is a 89 yo M, never smoker, with PMHx of recently diagnosed stomach/colon cancer (not yet on tx, diagnosed 1 month ago, via biopsy at [facility], follows w Dr Bolanos) ALCON, DM, HTN, HLD, DVT s/p treatment with Eliquis, claudication s/p RLE SFA stent, BPH presenting with weakness for two weeks.     #Pleural effusion  #Pneumonia  #Consolidation vs Atelectasis    Patient presenting with fever and weakness with ambulation, daughter reporting IVEY, diagnosed with pneumonia and found to have right pleural effusion. POCUS shows simple appearing moderate sized right effusion with compressive atelectasis vs consolidation of RLL and A-line predominant lung fields bilaterally anteriorly. Differentials for pleural effusion include malignant, less likely parapneumonic as patient denies significant cough/phlegm, has no elevated WBC and appears simple appearing on POCUS. Unclear if patient is currently taking Plavix.    Recommend:  -f/u if patient is taking plavix, daughter will bring in medications tomorrow  -possible bedside thoracentesis tomorrow  -Incentive spirometer, OOBTC, ambulate as tolerated     Plan pending discussion with attending.
per Internal Medicine    89 y o [place] sor[place] speaking M with PMHx of recently diagnosed stomach/colon cancer (not on treatment), iron deficiency anemia, DM, HTN, HLD, DVT (not on Eliquis), claudication s/p RLE SFA stent, BPH presenting with weakness, admitted for sepsis secondary to PNA.     Problem/Plan - 1:  ·  Problem: Sepsis.   ·  Plan: Presented with fever, tachycardic, and source likely PNA. Still awaiting UA, though patient currently not endorsing urinary symptoms. Denying any other constitutional sx such as abd pain, diarrhea, chills.   -follow blood and urine cultures  -urine strep negative  -urine legionella f/u  -s/p 2 L nacl  -treatment of pna as below.    Problem/Plan - 2:  ·  Problem: Pneumonia.   ·  Plan: Patient presenting with sepsis and weakness, source likely 2/2 PNA. On xray right sided consolidation noted. Denying cough, SOB currently.  Has had no recent hospitilizations or recent abx use. Negative COVID and RVP. Urine strep negative  -C/w Ctx and Azithro for CAP coverage  -follow blood, sputum, cultures  -follow legionella  -follow MRSA and RVP.    Problem/Plan - 3:  ·  Problem: Weakness.   ·  Plan: Presenting with 2 weeks of weakness and difficulty ambulating. Ddx includes malignancy vs poor po intake vs infection.  -workup and mangement of infection as above.  -will add on TSH/b12/folate   -will obtain nutrition consult for malnutrition and weight loss.  -Poor PO intake likely 2/2 nausea in addition to lack of appetitie, likely 2/2 gi malignancy.    Problem/Plan - 4:  ·  Problem: Cancer of stomach.   ·  Plan: Patient recently diagnosed with stomach and colon cancer 1 month ago. Has yet to begin treatment, has an appointment with Dr. Bolanos on 8/2/22.   -will alert Dr. Bolanos that patient is admitted  -continue to monitor.    Problem/Plan - 5:  ·  Problem: Stage 3 chronic kidney disease.   ·  Plan: Pt with elevated Cr 1.48, as per chart review, patient with baseline of 1.4-1.6.   -continue to monitor Cr.   -will avoid nephrotoxic agents    #BPH  -continue with tamsulosin 0.4mg daily  -will bladder scan q6.    Problem/Plan - 6:  ·  Problem: Hypertension.   ·  Plan: Patient presented initially with BP of 150s, then downtrended to SBP 110s to 130s.  As per last DC note ,patient had been on lisinopril and amlodipine; however, patient unsure of medications. As per med rec done on 7/25, patient not on any hypertensives. Called both [facility] and [facility].  -monitor BP.    Problem/Plan - 7:  ·  Problem: DM (diabetes mellitus).   ·  Plan: Patient with DM.  -continue mIss.    Problem/Plan - 8:  ·  Problem: Prophylactic measure.   ·  Plan: F-s/p 2L nacl  E-replete PRN  N-puree  DVT-heparin SQ  CODE FULL.

## 2022-07-28 NOTE — PROGRESS NOTE ADULT - PROBLEM SELECTOR PLAN 2
- Patient with minimal symptoms at rest but significant SOB and dyspnea on exertion.  - Awaiting palliative thoracenteisis this Friday.  - No plan for pleurex at this point.

## 2022-07-28 NOTE — PROGRESS NOTE ADULT - PROBLEM SELECTOR PLAN 7
Patient with DM.  -continue mIss
Patient presented initially with BP of 150s, then downtrended to SBP 110s to 130s.  As per last DC note ,patient had been on lisinopril and amlodipine; however, patient unsure of medications. As per med rec done on 7/25, patient not on any hypertensives.     Plan:  -monitor BP
Patient presented initially with BP of 150s, then downtrended to SBP 110s to 130s.  As per last DC note ,patient had been on lisinopril and amlodipine; however, patient unsure of medications. As per med rec done on 7/25, patient not on any hypertensives.     Plan:  -monitor BP

## 2022-07-28 NOTE — PROGRESS NOTE ADULT - SUBJECTIVE AND OBJECTIVE BOX
Physical Medicine and Rehabilitation Progress Note :    Patient is a 89y old  Male who presents with a chief complaint of Weakness, cough, SOB (27 Jul 2022 14:34)      HPI:    Patient is a 89 yo Lithuanian speaking M with PMHx of recently diagnosed stomach/colon cancer (not yet on tx, diagnosed 1 month ago, via biopsy at [facility], follows w Dr Bolanos) iron deficiency anemia, DM, HTN, HLD, DVT s/p treatment with eliquis, claudication s/p RLE SFA stent, BPH presenting with weakness for two weeks. Per daughter, has been experiencing weakness with ambulation for last 2-3 weeks, preventing him from walking and getting out of bed. As per daughter, patient has not been eating due to loss of appetite, resulting in a significant weight loss of roughly 20-30 lbs in the past 4 mos. Daughter endorses that patient has been having increased SOB when ambulating short distances, but denying any chest pain. Patient also endorsing nausea, but no vomiting. Denying fever/chills, URI symptoms, abd pain, diarrhea, dysuria, lightheadedness, CP, palpitations. Currently endorsing productive cough. No hx of sick contacts, no hx of travel, no le swelling.     In the ED  T 99.2-100.9, , /64, RR 18 97%  Labs Hg 10.4, WBC 9.39, Plt 423, lactate 1.6, BUN 19, Cr 1.48, Alb 3.0, Alk Phos 217, Tro 0.01,   CXR demonstrating   Course: Ceftriaxone 1g, Azithro 500mg, Tylenol, 2L NaCl       (25 Jul 2022 13:00)                            9.7    7.29  )-----------( 387      ( 28 Jul 2022 07:16 )             31.2       07-28    138  |  105  |  14  ----------------------------<  162<H>  4.5   |  23  |  1.16    Ca    8.3<L>      28 Jul 2022 07:16  Phos  2.9     07-28  Mg     2.3     07-28    TPro  6.1  /  Alb  2.4<L>  /  TBili  0.2  /  DBili  x   /  AST  28  /  ALT  14  /  AlkPhos  191<H>  07-28    Vital Signs Last 24 Hrs  T(C): 36.7 (28 Jul 2022 08:23), Max: 36.8 (27 Jul 2022 15:45)  T(F): 98.1 (28 Jul 2022 08:23), Max: 98.2 (27 Jul 2022 15:45)  HR: 79 (28 Jul 2022 08:23) (75 - 109)  BP: 121/71 (28 Jul 2022 08:23) (108/63 - 150/67)  BP(mean): --  RR: 18 (28 Jul 2022 08:23) (18 - 18)  SpO2: 97% (28 Jul 2022 08:23) (96% - 98%)    Parameters below as of 28 Jul 2022 08:23  Patient On (Oxygen Delivery Method): room air        MEDICATIONS  (STANDING):  atorvastatin 10 milliGRAM(s) Oral at bedtime  cefTRIAXone   IVPB 1000 milliGRAM(s) IV Intermittent every 24 hours  dextrose 5%. 1000 milliLiter(s) (50 mL/Hr) IV Continuous <Continuous>  dextrose 50% Injectable 25 Gram(s) IV Push once  glucagon  Injectable 1 milliGRAM(s) IntraMuscular once  heparin   Injectable 5000 Unit(s) SubCutaneous every 8 hours  insulin lispro (ADMELOG) corrective regimen sliding scale   SubCutaneous Before meals and at bedtime  mirtazapine 15 milliGRAM(s) Oral at bedtime  tamsulosin 0.4 milliGRAM(s) Oral at bedtime    MEDICATIONS  (PRN):  acetaminophen     Tablet .. 650 milliGRAM(s) Oral every 6 hours PRN Temp greater or equal to 38C (100.4F), Mild Pain (1 - 3)  dextrose Oral Gel 15 Gram(s) Oral once PRN Blood Glucose LESS THAN 70 milliGRAM(s)/deciliter  ondansetron    Tablet 4 milliGRAM(s) Oral every 6 hours PRN Nausea    Currently Undergoing Physical/ Occupational Therapy at bedside    Initial PT Functional Status Assessment :       Previous Level of Function:     · Ambulation Skills	needs device and assist  · Transfer Skills	needs device and assist  · ADL Skills	needs device and assist  · Additional Comments	pt states that he lives alone in an apartment w/ 5 steps to enter. States that he has a friend who comes to visit. Denies hx of recent falls. states that he has a SC has not ambulated recently    Cognitive Status Examination:   · Orientation	oriented to person, place, time and situation  · Level of Consciousness	alert  · Follows Commands and Answers Questions	100% of the time  · Personal Safety and Judgment	intact    Peripheral Neurovascular:     Neurovascular Assessment:   · LLE	warm; no discoloration; no tingling; no numbness  · RLE	warm; no discoloration; no tingling; no numbness    Range of Motion Exam:   · Range of Motion Examination	no ROM deficits were identified    Manual Muscle Testing:   · Manual Muscle Testing Results	grossly at least 3+/5 2/2 ability to perform functional mob against gravity    Bed Mobility: Rolling/Turning:     · Level of [place]	minimum assist (75% patients effort)  · Physical Assist/Nonphysical Assist	1 person assist; nonverbal cues (demo/gestures); verbal cues  · Assistive Device	bed rails    Bed Mobility: Sit to Supine:     · Level of [place]	minimum assist (75% patients effort)  · Physical Assist/Nonphysical Assist	1 person assist; nonverbal cues (demo/gestures); verbal cues  · Assistive Device	bed rails    Bed Mobility: Supine to Sit:     · Level of [place]	minimum assist (75% patients effort)  · Physical Assist/Nonphysical Assist	1 person assist; nonverbal cues (demo/gestures); verbal cues  · Assistive Device	bed rails    Bed Mobility Analysis:     · Bed Mobility Limitations	decreased ability to use arms for pushing/pulling; decreased ability to use legs for bridging/pushing; impaired ability to control trunk for mobility  · Impairments Contributing to Impaired Bed Mobility	impaired balance; impaired postural control; decreased strength    Transfer: Sit to Stand:     · Level of [place]	moderate assist (50% patients effort)  · Physical Assist/Nonphysical Assist	1 person assist; nonverbal cues (demo/gestures); verbal cues  · Assistive Device	HHA    Transfer: Stand to Sit:     · Level of [place]	minimum assist (75% patients effort)  · Physical Assist/Nonphysical Assist	1 person assist; nonverbal cues (demo/gestures); verbal cues  · Assistive Device	HHA    Sit/Stand Transfer Safety Analysis:     · Transfer Safety Concerns Noted	decreased balance during turns; decreased sequencing ability; losing balance; decreased step length; decreased weight-shifting ability  · Impairments Contributing to Impaired Transfers	impaired balance; impaired postural control; decreased strength    Gait Skills:     · Level of [place]	moderate assist (50% patients effort)  · Physical Assist/Nonphysical Assist	1 person assist; nonverbal cues (demo/gestures); verbal cues  · Assistive Device	HHA  · Gait Distance	4 side steps along EOB    Gait Analysis:     · Gait Pattern Used	3-point gait  · Gait Deviations Noted	decreased girish; increased time in double stance; decreased step length; decreased weight-shifting ability  · Impairments Contributing to Gait Deviations	impaired balance; impaired postural control; decreased strength    Stair Negotiation:     · Level of [place]	NA    Balance Skills Assessment:     · Sitting Balance: Static	good balance  · Sitting Balance: Dynamic	good balance  · Sit-to-Stand Balance	poor balance  · Standing Balance: Static	fair balance  · Standing Balance: Dynamic	poor balance  · Systems Impairment Contributing to Balance Disturbance	musculoskeletal  · Identified Impairments Contributing to Balance Disturbance	impaired postural control; decreased strength    Sensory Examination:   Sensory Examination:    Grossly Intact:   · Gross Sensory Examination	Grossly Intact      Clinical Impressions:   · Criteria for Skilled Therapeutic Interventions	impairments found; functional limitations in following categories; risk reduction/prevention; rehab potential; therapy frequency; anticipated discharge recommendation  · Impairments Found (describe specific impairments)	aerobic capacity/endurance; gait, locomotion, and balance; muscle strength; posture  · Functional Limitations in Following Categories (describe specific limitations)	self-care; home management; community/leisure  · Risk Reduction/Prevention (Describe Specific Areas of risk reduction/prevention)	risk factors  · Risk Areas	fall  · Rehab Potential	fair, will monitor progress closely  · Therapy Frequency	2-3x/week        PM&R Impression : as above    Current Disposition Plan Recommendations :    subacute rehab placement

## 2022-07-28 NOTE — PROGRESS NOTE ADULT - PROBLEM SELECTOR PLAN 1
- As per oncology, first diagnosed at [facility]. Patient never pursued disease modifying interventions at the time.  - History of DVT related to malignancy and now with pleural effusion, likely malignant.  - Given decreasing functional status, no disease modifying interventions as per oncology.  - Planning for home hospice services after throacentesis.

## 2022-07-29 NOTE — DISCHARGE NOTE NURSING/CASE MANAGEMENT/SOCIAL WORK - PATIENT PORTAL LINK FT
You can access the FollowMyHealth Patient Portal offered by [facility] by registering at the following website: [email or web address]. By joining [facility]’s FollowMyHealth portal, you will also be able to view your health information using other applications (apps) compatible with our system.
pepcid taken last night and this am

## 2022-07-29 NOTE — PROGRESS NOTE ADULT - SUBJECTIVE AND OBJECTIVE BOX
PULMONARY CONSULT SERVICE FOLLOW-UP NOTE    INTERVAL HPI:  Reviewed chart and overnight events; patient seen and examined at bedside. Patient continues to have no new medical complaints.     MEDICATIONS:  Pulmonary:    Antimicrobials:  cefTRIAXone   IVPB 1000 milliGRAM(s) IV Intermittent every 24 hours    Anticoagulants:  heparin   Injectable 5000 Unit(s) SubCutaneous every 8 hours    Cardiac:  tamsulosin 0.4 milliGRAM(s) Oral at bedtime      Allergies    No Known Allergies    Intolerances        Vital Signs Last 24 Hrs  T(C): 36.9 (29 Jul 2022 08:44), Max: 37.1 (28 Jul 2022 20:47)  T(F): 98.5 (29 Jul 2022 08:44), Max: 98.7 (28 Jul 2022 20:47)  HR: 89 (29 Jul 2022 08:44) (83 - 100)  BP: 132/70 (29 Jul 2022 08:44) (114/68 - 144/58)  BP(mean): --  RR: 16 (29 Jul 2022 08:44) (16 - 18)  SpO2: 96% (29 Jul 2022 08:44) (96% - 97%)    Parameters below as of 29 Jul 2022 08:44  Patient On (Oxygen Delivery Method): room air    PHYSICAL EXAM:  Constitutional: NAD  HEENT: NC/AT; PERRL, anicteric sclera; MMM  Neck: supple  Cardiovascular: +S1/S2, RRR  Respiratory: Decreased breath sounds over right lung field, L lung CTA  Gastrointestinal: soft, NT/ND  Extremities: WWP; no edema, clubbing or cyanosis  Vascular: 2+ radial pulses B/L  Neurological: awake and alert; VANG      LABS:  CBC Full  -  ( 29 Jul 2022 08:02 )  WBC Count : 7.13 K/uL  RBC Count : 3.47 M/uL  Hemoglobin : 9.8 g/dL  Hematocrit : 31.7 %  Platelet Count - Automated : 405 K/uL  Mean Cell Volume : 91.4 fl  Mean Cell Hemoglobin : 28.2 pg  Mean Cell Hemoglobin Concentration : 30.9 gm/dL  Auto Neutrophil # : x  Auto Lymphocyte # : x  Auto Monocyte # : x  Auto Eosinophil # : x  Auto Basophil # : x  Auto Neutrophil % : x  Auto Lymphocyte % : x  Auto Monocyte % : x  Auto Eosinophil % : x  Auto Basophil % : x    07-29    136  |  102  |  13  ----------------------------<  167<H>  4.5   |  26  |  1.16    Ca    8.4      29 Jul 2022 08:02  Phos  2.6     07-29  Mg     1.9     07-29    TPro  6.1  /  Alb  2.4<L>  /  TBili  0.2  /  DBili  x   /  AST  28  /  ALT  14  /  AlkPhos  191<H>  07-28    RADIOLOGY & ADDITIONAL STUDIES:

## 2022-07-29 NOTE — CHART NOTE - NSCHARTNOTEFT_GEN_A_CORE
Admitting Diagnosis:   Patient is a 89y old  Male who presents with a chief complaint of FTT  Shortness of breath (28 Jul 2022 15:35)  PAST MEDICAL & SURGICAL HISTORY:  HLD (hyperlipidemia)  DM (diabetes mellitus)  Hypertension  Claudication  DVT (deep venous thrombosis)  BPH (benign prostatic hyperplasia)  Cancer of stomach  Colon cancer  Presence of stent in artery  femoral artery- left  H/O hernia repair  Cataracts, both eyes    Current Nutrition Order: Pureed diet, with Ensure Enlive ONS BID   PO Intake: Good (%) [   ]  Fair (50-75%) [ x ] Poor (<25%) [   ]  GI Issues: within normal limits at this time per flowsheets data   Pain: denies pain/discomfort per flowsheets data   Skin Integrity: Clement: 19; no edema noted; skin within normal limits at this time per flowsheets data.     Labs:   07-29    136  |  102  |  13  ----------------------------<  167<H>  4.5   |  26  |  1.16    Ca    8.4      29 Jul 2022 08:02  Phos  2.6     07-29  Mg     1.9     07-29    TPro  6.1  /  Alb  2.4<L>  /  TBili  0.2  /  DBili  x   /  AST  28  /  ALT  14  /  AlkPhos  191<H>  07-28    CAPILLARY BLOOD GLUCOSE    POCT Blood Glucose.: 229 mg/dL (29 Jul 2022 12:14)  POCT Blood Glucose.: 144 mg/dL (29 Jul 2022 08:37)  POCT Blood Glucose.: 187 mg/dL (28 Jul 2022 22:23)  POCT Blood Glucose.: 216 mg/dL (28 Jul 2022 17:10)    Medications:  MEDICATIONS  (STANDING):  atorvastatin 10 milliGRAM(s) Oral at bedtime  dextrose 5%. 1000 milliLiter(s) (50 mL/Hr) IV Continuous <Continuous>  dextrose 50% Injectable 25 Gram(s) IV Push once  glucagon  Injectable 1 milliGRAM(s) IntraMuscular once  heparin   Injectable 5000 Unit(s) SubCutaneous every 8 hours  insulin lispro (ADMELOG) corrective regimen sliding scale   SubCutaneous Before meals and at bedtime  lidocaine 1% Injectable 20 milliLiter(s) Local Injection once  mirtazapine 15 milliGRAM(s) Oral at bedtime  tamsulosin 0.4 milliGRAM(s) Oral at bedtime    MEDICATIONS  (PRN):  acetaminophen     Tablet .. 650 milliGRAM(s) Oral every 6 hours PRN Temp greater or equal to 38C (100.4F), Mild Pain (1 - 3)  dextrose Oral Gel 15 Gram(s) Oral once PRN Blood Glucose LESS THAN 70 milliGRAM(s)/deciliter  ondansetron    Tablet 4 milliGRAM(s) Oral every 6 hours PRN Nausea    Anthropometrics:   Height for BMI (FEET)	5 Feet  Height for BMI (INCHES)	7 Inch(s)  Height for BMI (CM)            170.18 Centimeter(s)  Weight for BMI (lbs)	150 lb  Weight for BMI (kg)	68 kg  Body Mass Index	              23.4    Weight Change: No new wts since admission.     Estimated energy needs:   Weight used for calculations	current weight  Estimated Energy Needs Weight (lbs)	150 lb  Estimated Energy Needs Weight (kg)	68 kg  Estimated Energy Needs From (alejandrina/kg)30  Estimated Energy Needs To (alejandrina/kg)	35  Estimated Energy Needs Calculated From (alejandrina/kg) 2040  Estimated Energy Needs Calculated To (alejandrina/kg)	   2380  Weight used for calculations	current weight  Estimated Protein Needs Weight (lbs)	150 lb  Estimated Protein Needs Weight (kg)	68 kg  Estimated Protein Needs From (g/kg)	1.2  Estimated Protein Needs To (g/kg)	1.5  Estimated Protein Needs Calculated From (g/kg) 81.6  Estimated Protein Needs Calculated To (g/kg)	 102  Other Calculations	%IBW: 101; ABW used to calculate estimated needs d/t pt being between 80 and 120% IBW. Adjusted for PCM and colon/stomach cancer. Fluids per team.    Subjective:   89 yo Sudanese speaking M with PMHx of recently diagnosed stomach/colon cancer (not on treatment), iron deficiency anemia, DM, HTN, HLD, DVT (not on Eliquis), claudication s/p RLE SFA stent, BPH presenting with weakness, admitted for sepsis secondary to PNA. 07/27/22: Abd US showed cholelithiasis, no hepatic metastasis, small abdominal ascites.     Pt seen on 4U at bedside. RD interviewed pt in Sudanese on this date. As per pt, appetite good. Pt consumed >50% breakfast on this date, pt endorsed ~50% PO intakes overall. No c/o of N/V/D/C noted. Skin: within normal limits at this time. During interview, s/s of severe malnutrition still observed by RD. RD encouraged pt to increase PO intakes overall as able. Pt endorsed he is accepting and enjoying the Ensure Enlive ONS. No additional nutrition-related concerns. RD will remain available. Additional nutrition recommendations listed below to follow.     Previous Nutrition Diagnosis: Malnutrition (Severe PCM) RT inability to meet >75% EER via PO intake AEB pt with significant wt loss, NFPE, poor PO intake PTA    Active [ x ]     Resolved    Goal: Pt to consistently meet >75% of EER during hospital stay and show no further s/sx of malnutrition    Recommendations:  1. Continue with current diet order and ONS (Ensure Enlive BID (350kcal, 20g protein per serving)  2. Encourage pt to meet nutritional needs as able   3. Encourage adherence to diet education (reinforce as able)   4. Pain and bowel regimen per team   5. Will continue to assess/honor preferences as able  6. Align nutrition interventions with GOC at all times     Education: Pt amenable to education; RD provided education in regards to the importance of adequate macro and micronutrients, as well as hydration to support ADLs, maintain energy levels and overall functional/nutritional status. RD noted the importance of maintaining LBM and preserving muscles and nourishing them as able. Pt was receptive and verbalized understanding.     Risk Level: High [ x ] Moderate [   ] Low [   ]

## 2022-07-29 NOTE — PROGRESS NOTE ADULT - PROVIDER SPECIALTY LIST ADULT
Pulmonology
Pulmonology
Rehab Medicine
Pulmonology
Palliative Care
Internal Medicine

## 2022-07-29 NOTE — DISCHARGE NOTE NURSING/CASE MANAGEMENT/SOCIAL WORK - NSDCFUADDAPPT_GEN_ALL_CORE_FT
Appointment with Dr. Bolanos scheduled for 8/2/22  [address] [place] [address]  [phone]

## 2022-07-29 NOTE — DISCHARGE NOTE NURSING/CASE MANAGEMENT/SOCIAL WORK - NSDCPEFALRISK_GEN_ALL_CORE
For information on Fall & Injury Prevention, visit: [email or web address] OR  [email or web address] OR  https://www.cdc.gov/steadi/patient.html

## 2022-07-29 NOTE — PROGRESS NOTE ADULT - ASSESSMENT
Patient is a 89 yo M, never smoker, with PMHx of recently diagnosed stomach/colon cancer (not yet on tx, diagnosed 1 month ago, via biopsy at [facility], follows w Dr Bolanos) ALCON, DM, HTN, HLD, DVT s/p treatment with Eliquis, claudication s/p RLE SFA stent, BPH presenting with weakness for two weeks.     #Pleural effusion  #Pneumonia  #Consolidation vs Atelectasis    Patient presenting with fever and weakness with ambulation, daughter reporting IVEY, diagnosed with pneumonia and found to have right pleural effusion. POCUS shows simple appearing moderate sized right effusion with compressive atelectasis vs consolidation of RLL and A-line predominant lung fields bilaterally anteriorly. Differentials for pleural effusion include malignant, less likely parapneumonic as patient denies significant cough/phlegm, has no elevated WBC and appears simple appearing on POCUS. Per patient's daughter, he is taking plavix at home. Given the patient is going for home hospice and would therefore not be a surgical candidate if he were to have an intrathoracic haemorrhage, will defer thoracentesis until Friday so plavix is cleared from his system and the risk of bleeding is minimized.     Recommend:  -hold plavix  -Thora today  -Incentive spirometer, OOBTC, ambulate as tolerated     Patient s/e/d with Dr. Bernard. Pulm will follow. 
Patient is a 89 yo M, never smoker, with PMHx of recently diagnosed stomach/colon cancer (not yet on tx, diagnosed 1 month ago, via biopsy at [facility], follows w Dr Bolanos) ALCON, DM, HTN, HLD, DVT s/p treatment with Eliquis, claudication s/p RLE SFA stent, BPH presenting with weakness for two weeks.     #Pleural effusion  #Pneumonia  #Consolidation vs Atelectasis    Patient presenting with fever and weakness with ambulation, daughter reporting IVEY, diagnosed with pneumonia and found to have right pleural effusion. POCUS shows simple appearing moderate sized right effusion with compressive atelectasis vs consolidation of RLL and A-line predominant lung fields bilaterally anteriorly. Differentials for pleural effusion include malignant, less likely parapneumonic as patient denies significant cough/phlegm, has no elevated WBC and appears simple appearing on POCUS. Per patient's daughter, he is taking plavix at home. Given the patient is going for home hospice and would therefore not be a surgical candidate if he were to have an intrathoracic haemorrhage, will defer thoracentesis until Friday so plavix is cleared from his system and the risk of bleeding is minimized.     Recommend:  -hold plavix  -bedside thoracentesis Friday  -Incentive spirometer, OOBTC, ambulate as tolerated     Patient s/e/d with Dr. Bernard. Pulm will follow. 
per Internal Medicine    89 yo Urdu speaking M with PMHx of recently diagnosed stomach/colon cancer (not on treatment), iron deficiency anemia, DM, HTN, HLD, DVT (not on Eliquis), claudication s/p RLE SFA stent, BPH presenting with weakness, admitted for sepsis secondary to PNA.     Problem/Plan - 1:  ·  Problem: Sepsis.   ·  Plan: p/w fever, tachycardic, and source likely PNA. Still awaiting UA, though patient currently not endorsing urinary symptoms. Denying any other constitutional sx such as abd pain, diarrhea, chills. Negative COVID and RVP. Urine strep negative, urine legionella negative    Plan:  -follow blood; urine cultures stated unacceptable specimen, repeat after antibiotic resolution   -treatment of pna as below.    Problem/Plan - 2:  ·  Problem: Pneumonia.   ·  Plan: Patient presenting with sepsis and weakness, source likely 2/2 PNA. On xray right sided consolidation noted. Denying cough, SOB currently.  Has had no recent hospitalizations or recent abx use. Negative COVID and RVP. Urine strep negative. Negative legionella.    Plan:  -C/w Ctx for CAP coverage  -follow blood, sputum, cultures  -follow MRSA.    Problem/Plan - 3:  ·  Problem: Weakness.   ·  Plan: Presenting with 2 weeks of weakness and difficulty ambulating. Ddx includes malignancy vs poor po intake vs infection.    Plan:  -workup and mangement of infection as above.  -f/u on TSH/b12/folate   -Poor PO intake likely 2/2 nausea in addition to lack of appetitie, likely 2/2 gi malignancy.  - ensure enlive BID.    Problem/Plan - 4:  ·  Problem: Cancer of stomach.   ·  Plan: Patient recently diagnosed with stomach and colon cancer 1 month ago. Has yet to begin treatment, has an appointment with Dr. Bolanos on 8/2/22, likely will not need. Per Dr. Bolanos pt saw their pallaitive team and dont want any treatment, Heme onc spoke to family and confirmed no further management needed. strict comfort care. Daughter is main proxy. Dispo home hospice.     Plan:  -continue to monitor  -f/u abd US.    Problem/Plan - 5:  ·  Problem: Pleural effusion.   ·  Plan: malignant vs. infectious in nature. Pt found to have right lower pleural effusion. Pulm consulted and recommended  thoracentesis which will have to wait till Friday due to pt being on plavix before being here.    Plan:  - f/u w/ pulm.    Problem/Plan - 6:  ·  Problem: Stage 3 chronic kidney disease.   ·  Plan: Pt with elevated Cr 1.48, as per chart review, patient with baseline of 1.4-1.6.   -continue to monitor Cr.   -will avoid nephrotoxic agents    #BPH  -continue with tamsulosin 0.4mg daily  -will bladder scan q6.    Problem/Plan - 7:  ·  Problem: Hypertension.   ·  Plan: Patient presented initially with BP of 150s, then downtrended to SBP 110s to 130s.  As per last DC note ,patient had been on lisinopril and amlodipine; however, patient unsure of medications. As per med rec done on 7/25, patient not on any hypertensives.     Plan:  -monitor BP.    Problem/Plan - 8:  ·  Problem: DM (diabetes mellitus).   ·  Plan: Patient with DM.  -continue mIss.    Problem/Plan - 9:  ·  Problem: Prophylactic measure.   ·  Plan: F-s/p 2L nacl  E-replete PRN  N-puree  DVT-heparin SQ  CODE FULL.
Patient is a 89 yo M, never smoker, with PMHx of recently diagnosed stomach/colon cancer (not yet on tx, diagnosed 1 month ago, via biopsy at [facility], follows w Dr Bolanos) ALCON, DM, HTN, HLD, DVT s/p treatment with Eliquis, claudication s/p RLE SFA stent, BPH presenting with weakness for two weeks.     #Pleural effusion  #Pneumonia  #Consolidation vs Atelectasis    Patient presenting with fever and weakness with ambulation, daughter reporting IVEY, diagnosed with pneumonia and found to have right pleural effusion. POCUS shows simple appearing moderate sized right effusion with compressive atelectasis vs consolidation of RLL and A-line predominant lung fields bilaterally anteriorly. Differentials for pleural effusion include malignant, less likely parapneumonic as patient denies significant cough/phlegm, has no elevated WBC and appears simple appearing on POCUS. Per patient's daughter, he is taking plavix at home. Given the patient is going for home hospice and would therefore not be a surgical candidate if he were to have an intrathoracic haemorrhage, will defer thoracentesis until Friday so plavix is cleared from his system and the risk of bleeding is minimized.     Recommend:  -hold plavix  -bedside thoracentesis Friday  -Incentive spirometer, OOBTC, ambulate as tolerated     Patient s/e/d with Dr. Bernard. Pulm will follow. 
 Patient is a 89 yo Sri Lankan speaking M with PMHx of recently diagnosed stomach/colon cancer (not on treatment), iron deficiency anemia, DM, HTN, HLD, DVT (not on Eliquis), claudication s/p RLE SFA stent, BPH presenting with weakness, admitted for sepsis secondary to PNA. 
89 M with PMH of colon cancer, pleural effusion, debility, encounter for palliative care.
 Patient is a 89 yo Algerian speaking M with PMHx of recently diagnosed stomach/colon cancer (not on treatment), iron deficiency anemia, DM, HTN, HLD, DVT (not on Eliquis), claudication s/p RLE SFA stent, BPH presenting with weakness, admitted for sepsis secondary to PNA. 
 Patient is a 89 yo Cape Verdean speaking M with PMHx of recently diagnosed stomach/colon cancer (not on treatment), iron deficiency anemia, DM, HTN, HLD, DVT (not on Eliquis), claudication s/p RLE SFA stent, BPH presenting with weakness, admitted for sepsis secondary to PNA.

## 2022-07-29 NOTE — DIETITIAN NUTRITION RISK NOTIFICATION - ADDITIONAL COMMENTS/DIETITIAN RECOMMENDATIONS
1. Continue with current diet order and ONS (Ensure Enlive BID (350kcal, 20g protein per serving))  2. Encourage pt to meet nutritional needs as able   3. Encourage adherence to diet education (reinforce as able)   4. Pain and bowel regimen per team   5. Will continue to assess/honor preferences as able  6. Align nutrition interventions with GOC at all times

## 2022-07-29 NOTE — PROCEDURE NOTE - NSUSCPTCODES_ED_ALL
37496 US Chest (PTX, Pleural Effussion/CHF vs COPD)
99764 US Chest (PTX, Pleural Effussion/CHF vs COPD)

## 2022-09-10 PROBLEM — N40.0 BENIGN PROSTATIC HYPERPLASIA WITHOUT LOWER URINARY TRACT SYMPTOMS: Chronic | Status: ACTIVE | Noted: 2022-01-01

## 2022-09-10 PROBLEM — I82.409 ACUTE EMBOLISM AND THROMBOSIS OF UNSPECIFIED DEEP VEINS OF UNSPECIFIED LOWER EXTREMITY: Chronic | Status: ACTIVE | Noted: 2022-01-01

## 2022-09-10 PROBLEM — C16.9 MALIGNANT NEOPLASM OF STOMACH, UNSPECIFIED: Chronic | Status: ACTIVE | Noted: 2022-01-01

## 2022-09-10 PROBLEM — C18.9 MALIGNANT NEOPLASM OF COLON, UNSPECIFIED: Chronic | Status: ACTIVE | Noted: 2022-01-01

## 2022-09-10 NOTE — H&P ADULT - PROBLEM SELECTOR PLAN 5
Writer contacted Pt.  Pt reports that she was evaluated in the [place], is positive for COVID-19 and was prescribed a Z-Pack, tessalon perles and baby aspirin.  Pt is inquiring if she should receive the monoclonal antibody therapy.  Writer advised Pt to call the ER and inquire if she qualifies for the antibody therapy.  Pt is agreeable to plan and verbalizes complete understanding.   Known hx of NIDDM  - mISS  - f/u A1c Patient recently diagnosed with stomach and colon cancer 1 month ago. Has yet to begin treatment, has an appointment with Dr. Bolanos on 8/2/22.   -will alert Dr. Bolanos that patient is admitted  -continue to monitor

## 2022-09-10 NOTE — ED PROVIDER NOTE - CLINICAL SUMMARY MEDICAL DECISION MAKING FREE TEXT BOX
fever/chronic abd pain- w met ca- dehydration- poor po intake- acute on chronic pleural effusion noted- px co sob-  ext d/w daughter- they want to cont DNR/DNI- but they want IVF, pain meds, thoracentesis

## 2022-09-10 NOTE — H&P ADULT - PROBLEM SELECTOR PLAN 7
F: s/p 2.5L nacl  E: replete PRN  N: puree  DVT: lovenox SubQ  CODE FULL. c/w home tamsulosin Patient with baseline of 1.4-1.6 currently 1.19   - continue to monitor Cr   - will avoid nephrotoxic agents  - renally adjust medications

## 2022-09-10 NOTE — H&P ADULT - HISTORY OF PRESENT ILLNESS
Patient is a 89 yo Chinese speaking M with PMHx of recently diagnosed stomach/colon cancer (not yet on tx, diagnosed 1 month ago, via biopsy at [facility], follows w Dr Bolanos) iron deficiency anemia, DM, HTN, HLD, DVT s/p treatment with eliquis, claudication s/p RLE SFA stent, BPH presenting   Patient is a 89 yo Bengali speaking M with PMHx of recently diagnosed stomach/colon cancer (not yet on tx, diagnosed 1 month ago, via biopsy at [facility], follows w Dr Boalnos) iron deficiency anemia, DM, HTN, HLD, DVT s/p treatment with eliquis, claudication s/p RLE SFA stent, BPH presenting cough and chills for 1 day.  Patient is a 89 yo Telugu speaking M with PMHx of recently diagnosed stomach/colon cancer (not yet on tx, diagnosed 1 month ago, via biopsy at [facility], follows w Dr Bolanos) iron deficiency anemia, DM, HTN, HLD, DVT s/p treatment with eliquis, claudication s/p RLE SFA stent, BPH presenting cough and chills for 1 day. Pt reports feeling well. Per daughter and wife over phone. Pt had non productive cough the day prior to admission. Today, patient Patient is a 89 yo Mohawk speaking M with PMHx of recently diagnosed stomach/colon cancer (not yet on tx, diagnosed recently, via biopsy at [facility], follows w Dr Bolanos) iron deficiency anemia, DM, HTN, HLD, DVT s/p treatment with eliquis, claudication s/p RLE SFA stent, BPH presenting cough and chills for 1 day. Pt reports feeling well. Per daughter and wife over phone. Pt had non productive cough the day prior to admission. Today, patient was seen rigoring with no productive cough and brought ot the ED.    In the ED:  Initial vital signs: T: 101.9 F, HR: 136, BP: 134/63, R: 20, SpO2: 96% on RA  Labs: significant for Hb 11.5, lactate 4.7 ->2.0, Na 132, Ca 8.2, Albumin 2.3, Alk Phos 213, Cr 1.19  Imaging:  CXR: R plerual Effusion  CT Chest, CT Abd: 1. Large right and small left pleural effusion, moderate ascites, numerous hepatic lesions, in this patient with history of colon cancer and stomach cancer. These findings are concerning for malignant ascites/pleural effusion and diffuse metastatic disease to the liver.  2. Diffuse mural thickening of the stomach which may represent suboptimal distention versus a gastric carcinoma in view the patient's provided history. Additional cecal mass identified which may correlate with the provided history of colon carcinoma. Limited study due to the absence of intestinal contrast.  EKG: Sinus Tach LAD  Medications: Vanc, Zosyn, 2.5L NS, Ofirmev, morphine  Consults: Pulm  Patient is a 89 yo Welsh speaking M with PMHx of recently diagnosed stomach/colon cancer (not yet on tx, diagnosed recently, via biopsy at [facility], follows w Dr Bolanos) iron deficiency anemia, DM, HTN, HLD, DVT s/p treatment with eliquis, claudication s/p RLE SFA stent, BPH presenting cough and chills for 1 day. Pt reports feeling well. Per daughter and wife over phone. Pt had non productive cough the day prior to admission. Today, patient was seen rigoring with no productive cough and brought ot the ED. In ED,  used and pt speaking full sentences. Pt denies fever, chills, productive cough, dysuria, diarrhea.    In the ED:  Initial vital signs: T: 101.9 F, HR: 136, BP: 134/63, R: 20, SpO2: 96% on RA  Labs: significant for Hb 11.5, lactate 4.7 ->2.0, Na 132, Ca 8.2, Albumin 2.3, Alk Phos 213, Cr 1.19  Imaging:  CXR: R plerual Effusion  CT Chest, CT Abd: 1. Large right and small left pleural effusion, moderate ascites, numerous hepatic lesions, in this patient with history of colon cancer and stomach cancer. These findings are concerning for malignant ascites/pleural effusion and diffuse metastatic disease to the liver.  2. Diffuse mural thickening of the stomach which may represent suboptimal distention versus a gastric carcinoma in view the patient's provided history. Additional cecal mass identified which may correlate with the provided history of colon carcinoma. Limited study due to the absence of intestinal contrast.  EKG: Sinus Tach LAD  Medications: Vanc, Zosyn, 2.5L NS, Ofirmev, morphine  Consults: Pulm

## 2022-09-10 NOTE — ED ADULT NURSE NOTE - NSFALLRSKASSESSDT_ED_ALL_ED
How Severe Is Your Rash?: moderate
Is This A New Presentation, Or A Follow-Up?: Rash
10-Sep-2022 11:44

## 2022-09-10 NOTE — H&P ADULT - PROBLEM SELECTOR PROBLEM 7
Nutrition, metabolism, and development symptoms BPH (benign prostatic hyperplasia) Stage 3 chronic kidney disease

## 2022-09-10 NOTE — ED PROVIDER NOTE - NS ED MD DISPO ADMITTING SERVICE
How Severe Are Your Spot(S)?: mild
What Type Of Note Output Would You Prefer (Optional)?: Standard Output
What Is The Reason For Today's Visit?: Full Body Skin Examination
What Is The Reason For Today's Visit? (Being Monitored For X): concerning skin lesions on an annual basis
MED

## 2022-09-10 NOTE — ED PROVIDER NOTE - CARE PLAN
Principal Discharge DX:	Weakness  Secondary Diagnosis:	Dehydration  Secondary Diagnosis:	Pleural effusion, right   1

## 2022-09-10 NOTE — H&P ADULT - PROBLEM SELECTOR PROBLEM 1
Severe sepsis Implemented All Universal Safety Interventions:  [place] to call system. Call bell, personal items and telephone within reach. Instruct patient to call for assistance. Room bathroom lighting operational. Non-slip footwear when patient is off stretcher. Physically safe environment: no spills, clutter or unnecessary equipment. Stretcher in lowest position, wheels locked, appropriate side rails in place.

## 2022-09-10 NOTE — ED ADULT TRIAGE NOTE - CHIEF COMPLAINT QUOTE
pt c/o SOB and chills since this morning. hx of colon and stomach cancer. on hospice care. denies CP, headache, dizziness, n/v/d, numbness/tingling.

## 2022-09-10 NOTE — H&P ADULT - PROBLEM SELECTOR PLAN 2
Patient presenting with severe sepsis source likely 2/2 PNA. On xray right sided consolidation noted. On 2L NC Has had recent hospitalizations 07/22. COVID -.  -C/w Vanc and zosyn for HAP coverage  -follow up blood, sputum, cultures  -follow up urine legionella and strep  -follow up MRSA and RVP.

## 2022-09-10 NOTE — ED ADULT NURSE NOTE - OBJECTIVE STATEMENT
patient presents to ED for fever, abdominal pain since earlier in the morning. patient has colon, gastric ca but no treated d/t DNR status. patient denies chest pain, dizziness, headache, nausea and vomiting. A&Ox3, no distress. stable at bed. placed on fully monitor.

## 2022-09-10 NOTE — H&P ADULT - PROBLEM SELECTOR PLAN 3
Patient presented initially with BP of 150s, then downtrended to SBP 110s to 130s.  As per last DC note ,patient had been on lisinopril and amlodipine; however, patient unsure of medications. As per med rec done on 7/25, patient not on any hypertensives. known recrudescent exudative pleural effusion, previous thoracentesis 07/22 by [facility] pulm last admission   - f/u pulm recs

## 2022-09-10 NOTE — H&P ADULT - PROBLEM SELECTOR PLAN 1
Presented with fever, tachycardic, and Lactate 4.7 source likely PNA. neg UA. Denying any other constitutional sx such as abd pain, diarrhea, chills.   -follow blood cx  -s/p 2.5 L nacl  -treatment of pna as below. Presented with fever, tachycardic, and Lactate 4.7 source likely PNA. neg UA. Denying any other constitutional sx such as abd pain, diarrhea, chills.   -follow blood cx  -s/p 2.5 L IVF  -treatment of pna as below.

## 2022-09-10 NOTE — ED ADULT NURSE REASSESSMENT NOTE - NS ED NURSE REASSESS COMMENT FT1
patient is complaining of abdominal pain, V/S checked 136/-97%. made aware of Dr. Love.
patient states pain is getting much better, stable at bed. A&Ox3.

## 2022-09-10 NOTE — H&P ADULT - PROBLEM SELECTOR PLAN 4
Patient recently diagnosed with stomach and colon cancer 1 month ago. Has yet to begin treatment, has an appointment with Dr. Bolanos on 8/2/22.   -will alert Dr. Bolanos that patient is admitted  -continue to monitor Patient presented initially with BP of 150s, then downtrended to SBP 110s to 130s.  As per last DC note ,patient had been on lisinopril and amlodipine; however, patient unsure of medications. As per med rec done on 7/25, patient not on any hypertensives.

## 2022-09-10 NOTE — ED PROVIDER NOTE - OBJECTIVE STATEMENT
89 yo M, never smoker, with PMHx of recently diagnosed stomach/colon cancer (not yet on tx, diagnosed 1 month ago, via biopsy at [facility], follows w Dr Bolanos) ALCON, DM, HTN, HLD, DVT s/p treatment with Eliquis, claudication s/p RLE SFA stent, BPH presenting with weakness for two weeks. 89 yo M, never smoker, with PMHx of recently diagnosed stomach/colon cancer (not yet on tx, diagnosed 1 month ago, via biopsy at [facility], follows w Dr Bolanos) ALCON, DM, HTN, HLD, DVT s/p treatment with Eliquis, claudication s/p RLE SFA stent, BPH presenting with fever, tactile at home  fever x 1 day- px w pulm effusion during last admission, occ cough  limited po intake mild nausea co gen abd pain  mod severity

## 2022-09-10 NOTE — ED ADULT TRIAGE NOTE - NS ED TRIAGE AVPU SCALE
Alert-The patient is alert, awake and responds to voice. The patient is oriented to time, place, and person. The triage nurse is able to obtain subjective information.
[facility]

## 2022-09-10 NOTE — H&P ADULT - ATTENDING COMMENTS
Pt admitted for severe sepssis 2/2 bacterial HAP, continue with vancomycin and zosyn. Pt returning from home hospice per family wishes. He is still DNR/DNI and no escalation of care. However, pt requesting antibiotics and therapeutic thoracentesis. Pulmonology consult in AM for therapeutic thoracentesis. Pt probably will benefit more from a palliative pleurX catheter going forward. Further goals of care discussion should be discussed. Agree with rest of history, physical, assessment and plan as noted above by Dr. Lyn.

## 2022-09-10 NOTE — H&P ADULT - NSHPLABSRESULTS_GEN_ALL_CORE
LABS:                         11.5   7.36  )-----------( 517      ( 10 Sep 2022 11:28 )             37.1     09-10    132<L>  |  96  |  13  ----------------------------<  209<H>  4.2   |  22  |  1.19    Ca    8.2<L>      10 Sep 2022 11:28    TPro  7.0  /  Alb  2.3<L>  /  TBili  0.7  /  DBili  x   /  AST  28  /  ALT  11  /  AlkPhos  213<H>  09-10    PT/INR - ( 10 Sep 2022 11:28 )   PT: 13.6 sec;   INR: 1.14          PTT - ( 10 Sep 2022 11:28 )  PTT:33.3 sec  Urinalysis Basic - ( 10 Sep 2022 16:38 )    Color: Yellow / Appearance: Clear / S.025 / pH: x  Gluc: x / Ketone: NEGATIVE  / Bili: Negative / Urobili: 1.0 E.U./dL   Blood: x / Protein: Trace mg/dL / Nitrite: NEGATIVE   Leuk Esterase: NEGATIVE / RBC: < 5 /HPF / WBC < 5 /HPF   Sq Epi: x / Non Sq Epi: 0-5 /HPF / Bacteria: Present /HPF            Lactate, Blood: 2.0 mmol/L (09-10 @ 14:10)  Lactate, Blood: 4.7 mmol/L (09-10 @ 11:28)      RADIOLOGY, EKG & ADDITIONAL TESTS:

## 2022-09-10 NOTE — H&P ADULT - CONVERSATION DETAILS
Discussed with Patients daughter and Patient's wife (Fijian Speaking HCP) on phone. Family reported that patient has an advanced directive stating he would like to die peacefully. Specifically, Pt does not want pressors or escalation of oxygen beyond nasal cannula. Per family, they would like continued  antibiotics, and thoracentesis.    Family feels comfortable taking care of the patient at home.

## 2022-09-10 NOTE — ED PROVIDER NOTE - PSYCHIATRIC, MLM
Alert and oriented to person, place, time/situation. normal mood and affect. no apparent risk to self or others.
Psych/Behavioral

## 2022-09-10 NOTE — H&P ADULT - NSHPPHYSICALEXAM_GEN_ALL_CORE
.  VITAL SIGNS:  T(C): 37.1 (09-10-22 @ 17:00), Max: 38.8 (09-10-22 @ 11:40)  T(F): 98.7 (09-10-22 @ 17:00), Max: 101.9 (09-10-22 @ 11:40)  HR: 97 (09-10-22 @ 18:27) (97 - 136)  BP: 115/73 (09-10-22 @ 18:27) (101/63 - 136/63)  BP(mean): --  RR: 16 (09-10-22 @ 18:27) (16 - 20)  SpO2: 98% (09-10-22 @ 18:27) (96% - 98%)  Wt(kg): --    PHYSICAL EXAM:    Constitutional: elderly male, resting comfortably  Head: NC/AT  Eyes: PERRL, EOMI, clear conjunctiva  ENT: no nasal discharge; uvula midline, no oropharyngeal erythema or exudates;  mucous membranes dry  Neck: supple; no JVD or thyromegaly  Respiratory: rales noted on right upper and lower lobes, otherwise ctabl  Cardiac: +S1/S2; RRR; no M/R/G; PMI non-displaced  Gastrointestinal: soft, but distended belly, non tender to palpation  Extremities: WWP, no clubbing or cyanosis; no peripheral edema, pain on palpation of LLE  Musculoskeletal: NROM x4; no joint swelling, tenderness or erythema  Vascular: 2+ radial, femoral, DP/PT pulses B/L  Dermatologic: skin warm, dry and intact; no rashes, wounds, or scars  Lymphatic: no submandibular or cervical LAD  Neurologic: AAOx3; CNII-XII grossly intact; no focal deficits  Psychiatric: affect and characteristics of appearance, verbalizations, behaviors are appropriate .  VITAL SIGNS:  T(C): 37.1 (09-10-22 @ 17:00), Max: 38.8 (09-10-22 @ 11:40)  T(F): 98.7 (09-10-22 @ 17:00), Max: 101.9 (09-10-22 @ 11:40)  HR: 97 (09-10-22 @ 18:27) (97 - 136)  BP: 115/73 (09-10-22 @ 18:27) (101/63 - 136/63)  BP(mean): --  RR: 16 (09-10-22 @ 18:27) (16 - 20)  SpO2: 98% (09-10-22 @ 18:27) (96% - 98%)  Wt(kg): --    PHYSICAL EXAM:    Constitutional: elderly male, resting comfortably  Head: NC/AT  Eyes: PERRL, EOMI, clear conjunctiva  ENT: no nasal discharge; uvula midline, no oropharyngeal erythema or exudates;  mucous membranes dry  Neck: supple; no JVD or thyromegaly  Respiratory: rales noted on right upper and lower lobes, otherwise ctabl  Cardiac: +S1/S2; RRR; no M/R/G; PMI non-displaced  Gastrointestinal: soft, but distended belly, non tender to palpation  Extremities: WWP, no clubbing or cyanosis; no peripheral edema, pain on palpation of LLE  Musculoskeletal: NROM x4; no joint swelling, tenderness or erythema  Vascular: 2+ radial, femoral, DP/PT pulses B/L  Dermatologic: skin warm, dry and intact; no rashes, wounds, or scars  Lymphatic: no submandibular or cervical LAD  Neurologic: AAOx1-2; CNII-XII grossly intact; no focal deficits  Psychiatric: affect and characteristics of appearance, verbalizations, behaviors are appropriate .  VITAL SIGNS:  T(C): 37.1 (09-10-22 @ 17:00), Max: 38.8 (09-10-22 @ 11:40)  T(F): 98.7 (09-10-22 @ 17:00), Max: 101.9 (09-10-22 @ 11:40)  HR: 97 (09-10-22 @ 18:27) (97 - 136)  BP: 115/73 (09-10-22 @ 18:27) (101/63 - 136/63)  BP(mean): --  RR: 16 (09-10-22 @ 18:27) (16 - 20)  SpO2: 98% (09-10-22 @ 18:27) (96% - 98%)  Wt(kg): --    PHYSICAL EXAM:    Constitutional: elderly male, resting comfortably  Head: NC/AT  Eyes: PERRL, EOMI, clear conjunctiva  ENT: no nasal discharge; uvula midline, no oropharyngeal erythema or exudates;  mucous membranes dry  Neck: supple; no JVD or thyromegaly  Respiratory: decreased breath sounds and rales noted on right upper and lower lobes, otherwise ctabl  Cardiac: +S1/S2; RRR; no M/R/G; PMI non-displaced  Gastrointestinal: soft, but distended belly, non tender to palpation  Extremities: WWP, no clubbing or cyanosis; no peripheral edema, pain on palpation of LLE  Musculoskeletal: NROM x4; no joint swelling, tenderness or erythema  Vascular: 2+ radial, femoral, DP/PT pulses B/L  Dermatologic: skin warm, dry and intact; no rashes, wounds, or scars  Lymphatic: no submandibular or cervical LAD  Neurologic: AAOx1-2; CNII-XII grossly intact; no focal deficits  Psychiatric: affect and characteristics of appearance, verbalizations, behaviors are appropriate .  VITAL SIGNS:  T(C): 37.1 (09-10-22 @ 17:00), Max: 38.8 (09-10-22 @ 11:40)  T(F): 98.7 (09-10-22 @ 17:00), Max: 101.9 (09-10-22 @ 11:40)  HR: 97 (09-10-22 @ 18:27) (97 - 136)  BP: 115/73 (09-10-22 @ 18:27) (101/63 - 136/63)  BP(mean): --  RR: 16 (09-10-22 @ 18:27) (16 - 20)  SpO2: 98% (09-10-22 @ 18:27) (96% - 98%)  Wt(kg): --    PHYSICAL EXAM:    Constitutional: elderly male, resting comfortably  Head: NC/AT  Eyes: PERRL, EOMI, clear conjunctiva  ENT: no nasal discharge; uvula midline, no oropharyngeal erythema or exudates;  mucous membranes dry  Neck: supple; no JVD or thyromegaly  Respiratory: decreased breath sounds and rales noted on right upper and lower lobes, otherwise ctabl  Cardiac: +S1/S2; RRR; no M/R/G; PMI non-displaced  Gastrointestinal: soft, but distended belly, mildy tender to palpation  Extremities: WWP, no clubbing or cyanosis; no peripheral edema, pain on palpation of LLE  Musculoskeletal: NROM x4; no joint swelling, tenderness or erythema  Vascular: 2+ radial, femoral, DP/PT pulses B/L  Dermatologic: skin warm, dry and intact; no rashes, wounds, or scars  Lymphatic: no submandibular or cervical LAD  Neurologic: AAOx1-2; CNII-XII grossly intact; no focal deficits  Psychiatric: affect and characteristics of appearance, verbalizations, behaviors are appropriate

## 2022-09-10 NOTE — H&P ADULT - ASSESSMENT
Patient is a 89 yo Kazakh speaking M with PMHx of recently diagnosed stomach/colon cancer (not on treatment), iron deficiency anemia, DM, HTN, HLD, DVT (not on Eliquis), claudication s/p RLE SFA stent, BPH presenting with weakness, admitted for severe sepsis secondary to PNA.

## 2022-09-11 NOTE — CHART NOTE - NSCHARTNOTEFT_GEN_A_CORE
Called to bedside by nursing for hypotension (MAPs of low 60s) and increased tachypnea. Evaluated patient at bedside, AOx2 (name/location), CTA b/l, tachy to low 100s and abdomen distended, no bowel sounds, lower extremities cool to touch. Bladder scan <60cc urine with minimal/no urine output during the day. 1L NS bolus given, with initial improvement in MAPs to 70s, but then returned to low 50s/60s. Unable to obtain full set of labs, but lactate of 14 found. Family contacted regarding clinical status, who report no pressors and wish the patient to be kept comfortable. Discussed with the family regarding the recommendation from general surgery about sump placement while pending CT scan for r/o SBO. Family initially in agreement, however do not wish to agitate the patient with the placement of a sump and have further interventions. Patient's daughter and wife discussed and agreed to transition to comfort measures only with no further escalation of care.

## 2022-09-11 NOTE — CONSULT NOTE ADULT - SUBJECTIVE AND OBJECTIVE BOX
PULMONARY SERVICE INITIAL CONSULT NOTE    HPI:  Patient is a 89 yo Slovak speaking M with PMHx of recently diagnosed stomach/colon cancer (not yet on tx, diagnosed recently, via biopsy at [facility], follows w Dr Bolanos) iron deficiency anemia, DM, HTN, HLD, DVT s/p treatment with eliquis, claudication s/p RLE SFA stent, BPH presenting cough and chills for 1 day. Pt reports feeling well. Per daughter and wife over phone. Pt had non productive cough the day prior to admission. Today, patient was seen rigoring with no productive cough and brought ot the ED. In ED,  used and pt speaking full sentences. Pt denies fever, chills, productive cough, dysuria, diarrhea.    In the ED:  Initial vital signs: T: 101.9 F, HR: 136, BP: 134/63, R: 20, SpO2: 96% on RA  Labs: significant for Hb 11.5, lactate 4.7 ->2.0, Na 132, Ca 8.2, Albumin 2.3, Alk Phos 213, Cr 1.19  Imaging:  CXR: R plerual Effusion  CT Chest, CT Abd: 1. Large right and small left pleural effusion, moderate ascites, numerous hepatic lesions, in this patient with history of colon cancer and stomach cancer. These findings are concerning for malignant ascites/pleural effusion and diffuse metastatic disease to the liver.  2. Diffuse mural thickening of the stomach which may represent suboptimal distention versus a gastric carcinoma in view the patient's provided history. Additional cecal mass identified which may correlate with the provided history of colon carcinoma. Limited study due to the absence of intestinal contrast.  EKG: Sinus Tach LAD  Medications: Vanc, Zosyn, 2.5L NS, Ofirmev, morphine  Consults: Pulm  (10 Sep 2022 21:30)    Additional pulmonary HPI:   Pulmonary service consulted for recurrent large malignant pleural effusions. Previously underwent thoracentesis in July with studies revealing lymphocytic predominance and path concerning for malignant cells. Seen and examined at bedside, no acute distress. Daughter and additional family members also present. Reportedly with fevers and rigors at home x1 day with some shortness of breath - similar to last presentation. Was uncomfrtable and concerned that his symptoms could be more optimally managed given GOC. Discussed with patient and family at bedside, amenable to additoinal thoracentesis for therapuetic effect given improvement last time.     REVIEW OF SYSTEMS:  Constitutional: fevers and chills  Eyes: No eye pain, visual disturbances, or discharge  ENMT:  No difficulty hearing, tinnitus, vertigo; No sinus or throat pain  Neck: No pain, stiffness or neck swelling  Respiratory: see HPI  Cardiovascular: No chest pain, palpitations, dizziness or leg swelling  Gastrointestinal: No abdominal or epigastric pain. No nausea, vomiting or hematemesis; No diarrhea or constipation. No melena or hematochezia.  Genitourinary: No dysuria, frequency, hematuria or incontinence  Neurological: No headaches, memory loss, loss of strength, numbness or tremors  Skin: No itching, burning, rashes or lesions   Lymph Nodes: No enlarged glands  Endocrine: No heat or cold intolerance; No hair loss  Musculoskeletal: No joint pain or swelling; No muscle, back or extremity pain  Psychiatric: No depression, anxiety, mood swings or difficulty sleeping  Heme/Lymph: No easy bruising or bleeding gums  Allergy and Immunologic: No hives or eczema    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      H/O hernia repair      Cataracts, both eyes          FAMILY HISTORY:  FH: diabetes mellitus  mother        SOCIAL HISTORY:  Smoking Status: [ ] Current, [ ] Former, [ ] Never  Pack Years:    MEDICATIONS:  Pulmonary:    Antimicrobials:  piperacillin/tazobactam IVPB.. 3.375 Gram(s) IV Intermittent every 6 hours  vancomycin  IVPB 1000 milliGRAM(s) IV Intermittent every 24 hours    Anticoagulants:    Onc:    GI/:  polyethylene glycol 3350 17 Gram(s) Oral daily  senna 2 Tablet(s) Oral at bedtime    Endocrine:  dextrose 50% Injectable 25 Gram(s) IV Push once  dextrose 50% Injectable 12.5 Gram(s) IV Push once  dextrose 50% Injectable 25 Gram(s) IV Push once  dextrose Oral Gel 15 Gram(s) Oral once PRN  glucagon  Injectable 1 milliGRAM(s) IntraMuscular once  insulin lispro (ADMELOG) corrective regimen sliding scale   SubCutaneous Before meals and at bedtime    Cardiac:  tamsulosin 0.4 milliGRAM(s) Oral at bedtime    Other Medications:  dextrose 5%. 1000 milliLiter(s) IV Continuous <Continuous>  dextrose 5%. 1000 milliLiter(s) IV Continuous <Continuous>  mirtazapine 15 milliGRAM(s) Oral at bedtime      Allergies    No Known Allergies    Intolerances        Vital Signs Last 24 Hrs  T(C): 36.7 (11 Sep 2022 05:29), Max: 37.1 (10 Sep 2022 12:40)  T(F): 98.1 (11 Sep 2022 05:29), Max: 98.8 (10 Sep 2022 12:40)  HR: 73 (11 Sep 2022 05:29) (72 - 117)  BP: 110/69 (11 Sep 2022 05:29) (109/50 - 136/63)  BP(mean): --  RR: 16 (11 Sep 2022 05:29) (14 - 16)  SpO2: 97% (11 Sep 2022 05:29) (96% - 98%)    Parameters below as of 10 Sep 2022 21:59  Patient On (Oxygen Delivery Method): nasal cannula        09-10 @ 07:01  -  09-11 @ 07:00  --------------------------------------------------------  IN: 0 mL / OUT: 500 mL / NET: -500 mL          PHYSICAL EXAM:  Constitutional: well-appearing  Head: NC/AT  EENT: PERRL, anicteric sclera; oropharynx clear, MMM  Neck: supple, no appreciable JVD  Respiratory: CTA B/L; no W/R/R  Cardiovascular: +S1/S2, RRR  Gastrointestinal: soft, NT/ND  Extremities: WWP; no edema, clubbing or cyanosis  Vascular: 2+ radial pulses B/L  Neurological: awake and alert; VANG    LABS:      CBC Full  -  ( 11 Sep 2022 05:30 )  WBC Count : 15.49 K/uL  RBC Count : 4.23 M/uL  Hemoglobin : 11.4 g/dL  Hematocrit : 37.2 %  Platelet Count - Automated : 500 K/uL  Mean Cell Volume : 87.9 fl  Mean Cell Hemoglobin : 27.0 pg  Mean Cell Hemoglobin Concentration : 30.6 gm/dL  Auto Neutrophil # : 12.93 K/uL  Auto Lymphocyte # : 1.35 K/uL  Auto Monocyte # : 0.67 K/uL  Auto Eosinophil # : 0.14 K/uL  Auto Basophil # : 0.00 K/uL  Auto Neutrophil % : 80.9 %  Auto Lymphocyte % : 8.7 %  Auto Monocyte % : 4.3 %  Auto Eosinophil % : 0.9 %  Auto Basophil % : 0.0 %        132<L>  |  97  |  17  ----------------------------<  195<H>  4.5   |  20<L>  |  1.66<H>    Ca    7.8<L>      11 Sep 2022 05:30  Phos  4.4       Mg     1.6         TPro  7.0  /  Alb  2.3<L>  /  TBili  0.7  /  DBili  x   /  AST  28  /  ALT  11  /  AlkPhos  213<H>  09-10    PT/INR - ( 10 Sep 2022 11:28 )   PT: 13.6 sec;   INR: 1.14          PTT - ( 10 Sep 2022 11:28 )  PTT:33.3 sec      Urinalysis Basic - ( 10 Sep 2022 16:38 )    Color: Yellow / Appearance: Clear / S.025 / pH: x  Gluc: x / Ketone: NEGATIVE  / Bili: Negative / Urobili: 1.0 E.U./dL   Blood: x / Protein: Trace mg/dL / Nitrite: NEGATIVE   Leuk Esterase: NEGATIVE / RBC: < 5 /HPF / WBC < 5 /HPF   Sq Epi: x / Non Sq Epi: 0-5 /HPF / Bacteria: Present /HPF                RADIOLOGY & ADDITIONAL STUDIES:

## 2022-09-11 NOTE — PROGRESS NOTE ADULT - SUBJECTIVE AND OBJECTIVE BOX
**Incomplete Note**   CC: Patient is a 89y old  Male who presents with a chief complaint of Severe Sepsis (11 Sep 2022 12:06)      INTERVAL EVENTS: Admitted o/n for abx and thoracentesis. MOLST form with no escalation of O2 beyond NC, no pressors, DNR (see H&P).    SUBJECTIVE / INTERVAL HPI: Patient seen and examined at bedside in AM. Feeling improved energy, resolution of rigors. Denies f/c, n/v, chest pain, cough.    ROS: negative unless otherwise stated above.    VITAL SIGNS:  Vital Signs Last 24 Hrs  T(C): 36.3 (11 Sep 2022 21:04), Max: 37.8 (11 Sep 2022 19:53)  T(F): 97.4 (11 Sep 2022 21:), Max: 100 (11 Sep 2022 19:53)  HR: 106 (11 Sep 2022 21:) (73 - 106)  BP: 82/58 (11 Sep 2022 21:) (81/53 - 110/69)  BP(mean): --  RR: 24 (11 Sep 2022 21:) (16 - 24)  SpO2: 90% (11 Sep 2022 21:) (90% - 97%)    Parameters below as of 11 Sep 2022 21:04  Patient On (Oxygen Delivery Method): nasal cannula  O2 Flow (L/min): 4        09-10-22 @ 07:01  -  22 @ 07:00  --------------------------------------------------------  IN: 0 mL / OUT: 500 mL / NET: -500 mL    22 @ 07:01  -  22 @ 01:24  --------------------------------------------------------  IN: 0 mL / OUT: 50 mL / NET: -50 mL        PHYSICAL EXAM:  General: elderly man, lying in bed  HEENT: MMM  Neck: supple  Cardiovascular: +S1/S2; RRR  Respiratory: decreased breath sounds and rales RUL RLL; CTA left lung fields; breathing comfortably  Gastrointestinal: soft, distended, minimal BS  Extremities: WWP; no edema, clubbing or cyanosis  Vascular: 2+ radial, DP/PT pulses B/L  Neurological: AAOx2; no focal deficits    MEDICATIONS:  MEDICATIONS  (STANDING):  dextrose 5%. 1000 milliLiter(s) (50 mL/Hr) IV Continuous <Continuous>  dextrose 5%. 1000 milliLiter(s) (100 mL/Hr) IV Continuous <Continuous>  dextrose 50% Injectable 25 Gram(s) IV Push once  dextrose 50% Injectable 12.5 Gram(s) IV Push once  dextrose 50% Injectable 25 Gram(s) IV Push once  glucagon  Injectable 1 milliGRAM(s) IntraMuscular once    MEDICATIONS  (PRN):  dextrose Oral Gel 15 Gram(s) Oral once PRN Blood Glucose LESS THAN 70 milliGRAM(s)/deciliter  morphine  - Injectable 1 milliGRAM(s) IV Push every 4 hours PRN Moderate Pain (4 - 6)      ALLERGIES:  Allergies    No Known Allergies    Intolerances        LABS:                        11.4   15.49 )-----------( 500      ( 11 Sep 2022 05:30 )             37.2     09-11    132<L>  |  97  |  17  ----------------------------<  195<H>  4.5   |  20<L>  |  1.66<H>    Ca    7.8<L>      11 Sep 2022 05:30  Phos  4.4     -11  Mg     1.6     -11    TPro  7.0  /  Alb  2.3<L>  /  TBili  0.7  /  DBili  x   /  AST  28  /  ALT  11  /  AlkPhos  213<H>  09-10    PT/INR - ( 10 Sep 2022 11:28 )   PT: 13.6 sec;   INR: 1.14          PTT - ( 10 Sep 2022 11:28 )  PTT:33.3 sec  Urinalysis Basic - ( 11 Sep 2022 20:14 )    Color: Yellow / Appearance: Clear / S.020 / pH: x  Gluc: x / Ketone: Trace mg/dL  / Bili: Small / Urobili: 1.0 E.U./dL   Blood: x / Protein: Trace mg/dL / Nitrite: NEGATIVE   Leuk Esterase: NEGATIVE / RBC: < 5 /HPF / WBC < 5 /HPF   Sq Epi: x / Non Sq Epi: 0-5 /HPF / Bacteria: Present /HPF      CAPILLARY BLOOD GLUCOSE      POCT Blood Glucose.: 80 mg/dL (11 Sep 2022 21:00)      RADIOLOGY & ADDITIONAL TESTS: Reviewed.

## 2022-09-11 NOTE — PROGRESS NOTE ADULT - PROBLEM SELECTOR PLAN 7
Patient with baseline of 1.4-1.6 currently 1.19   - continue to monitor Cr   - will avoid nephrotoxic agents  - renally adjust medications Cr 1.66 this AM, increased from 1.19. Patient with baseline of 1.1-1.2   - continue to monitor Cr   - encourage PO intake  - will avoid nephrotoxic agents  - renally adjust medications

## 2022-09-11 NOTE — PROVIDER CONTACT NOTE (OTHER) - ASSESSMENT
Escalated to Md Tovar, VS checked, pt straight cathed, F/S checked, MDs at bedside, pt will get 1L NS bolus Escalated to Md Tovar, VS checked, pt straight cathed, F/S checked, MDs at bedside, pt will get 1L NS bolus  is used [id number] Gayathri , pt is a/a&ox2 Escalated to Md Tovar,  Xray done VS checked, pt straight cathed, F/S checked, MDs at bedside, pt will get 1L NS bolus  is used [id number] Gayathri , pt is a/a&ox2

## 2022-09-11 NOTE — CONSULT NOTE ADULT - ASSESSMENT
Mr. Gonzales is a 89 yo Thai speaking M with PMHx of recently diagnosed stomach/colon cancer (not yet on tx, diagnosed recently, via biopsy at [facility], follows w Dr Bolanos) iron deficiency anemia, DM, HTN, HLD, DVT s/p treatment with eliquis, claudication s/p RLE SFA stent, BPH presenting cough and chills for 1 day. Pulmonary service consulted for recurrence of malignant pleural effusion.     #Malignant pleural effusion   #Shortness of breath     Presenting from home with shortness of breath, fever and rigors, with CXR and CT revealing reaccumulation of known R malingnant pleural effusion. Currently comfortable on 2L NC, but family insists symptoms were not well controlled at home prior to presentation to ED   - discussed with family and daughter at bedside; amenable to repeat thoracentesis to see if this assists his symptoms; confirmed with family, he has been off all AC and blood thinners since last admission    - s/p bedside thoracentesis with 2L of straw colored pleural fluid removed; diagnostic studies deferred given GOC   - Repeat POCUS with trace residual PLEF   - CXR without PTX     Call back with questions

## 2022-09-11 NOTE — PROCEDURE NOTE - NSUS ED ADDITIONAL DETAIL1 FT
Large R sided simple PLEF, trace L sided effusion   A line pattern b/l   Small residiual R sided effusion after thoracentesis. lung sliding present post procedure.

## 2022-09-11 NOTE — PROVIDER CONTACT NOTE (OTHER) - ACTION/TREATMENT ORDERED:
pt straight cathed, F/S checked, MDs at bedside, pt will get 1L NS bolus, Pt placed on monitor , ICU consult will be held , Md will call family, urine sent to lab

## 2022-09-11 NOTE — PROGRESS NOTE ADULT - ASSESSMENT
Patient is a 89 yo Slovenian speaking M with PMHx of recently diagnosed stomach/colon cancer (not on treatment), iron deficiency anemia, DM, HTN, HLD, DVT (not on Eliquis), claudication s/p RLE SFA stent, BPH presenting with weakness, admitted for severe sepsis secondary to PNA.

## 2022-09-11 NOTE — PROCEDURE NOTE - NSUSPLEURALMOVEMENT_ED_ALL
Date of Injury:  12/17/2019     First Date Seen:  12/17/19    Subjective:  Patient presents with 8 on a 10 pain scale today.  Patient reports a reduction in pain last visit.    Objective: Taut and tender fibers are noted in theC6, C7, T1, T2, T5, T6, T7, T8, T12, L3, L5, sacroiliac left and sacroiliac right regions.  Bilateral knee flexion test is positive.  Hip internal and external rotation are within normal limits and negative  for increasing pain. There is a right pelvic deficiency of 1/4 inch, indicating assymetry.     Opinion:Diagnosis of C6, C7, T1, T2, T5, T6, T7, T8, T12, L3, L5, sacroiliac left and sacroiliac right segmental and somatic dysfunction with associated pain patterns. Patient progressing as expected.    Options: Patient's options other than care in this office include home based advice to remain active and against bed rest.   Patient is advised to perform Cristhian extension exercises home based exercise.    Advice: Patient is advised to continue with home program.    Agreed Plan: Patient will follow up on as needed basis .    Procedures:  The patient understands and agrees to the following procedures:  Chiropractic manipulative treatment is performed at C6, C7, T1, T2, T5, T6, T7, T8, T12, L3, L5, sacroiliac left and sacroiliac right.  HVLA manipulations performed the midthoracic transition region  Vibratory massage performed in the trapezius, lumbar region for a period of 5 minutes.    Patient tolerated the procedure well. Post-manipulation audit reveals an increase in range of motion and decrease in pain.  End of visit pain rated 7.         Yes

## 2022-09-11 NOTE — CONSULT NOTE ADULT - ATTENDING COMMENTS
89 yo M presenting from home hospice with poorly controlled SOB, found to have large R sided pleural effusion likely malignant vs. parapneumonic given presented with +SIRS concerning for sepsis. Family agreeable to antibiotics and pleural drainage. Has had 1 thoracentesis 6 weeks ago (cytology with + atypical cells concerning for malignancy), now with first symptomatic recurrence. Will drain effusion completely for now, no indication for pleurx catheter in setting of possible infection. Plan for thora and conversion to chest tube only if vaishali pus consistent with empyema is present. Otherwise, proceed with thoracentesis + antibiotics. No need to send pleural studies again as it will not . Family very clear about overall goals of care. Pending response to thoracentesis would consider serial thoracenteses vs pleurx catheter. If prognosis considered < 3 months, would favor serial thoracentesis especially as he had minimal symptoms since last drainage. No further pulmonary intervention at this time. Please reconsult with questions. Patient can follow up with Dr. Wilberto Cortes for consideration of pleurx catheter if symptoms return.

## 2022-09-11 NOTE — PROGRESS NOTE ADULT - PROBLEM SELECTOR PLAN 5
Patient recently diagnosed with stomach and colon cancer 1 month ago. Has yet to begin treatment, has an appointment with Dr. Bolanos on 8/2/22.   -will alert Dr. Bolanos that patient is admitted  -continue to monitor Patient recently diagnosed with stomach and colon cancer 1 month ago. Has yet to begin treatment, has an appointment with Dr. Bolanos on 8/2/22.   - f/u with Dr. Bolanos that patient is admitted  - continue to monitor

## 2022-09-11 NOTE — PROGRESS NOTE ADULT - PROBLEM SELECTOR PLAN 1
Presented with fever, tachycardic, and Lactate 4.7 source likely PNA. neg UA. Denying any other constitutional sx such as abd pain, diarrhea, chills.   -follow blood cx  -s/p 2.5 L IVF  -treatment of pna as below. Presented with fever, tachycardic, and Lactate 4.7 source likely PNA. S/p 2.5 L IVF in ED. neg UA. Denying any other constitutional sx such as abd pain, diarrhea, chills.   -BCx NGTD  -UA +bacteria, -LE, -nitrites  -UCx NGTD  -treatment of pna as below.

## 2022-09-11 NOTE — PROGRESS NOTE ADULT - PROBLEM SELECTOR PLAN 3
known recrudescent exudative pleural effusion, previous thoracentesis 07/22 by [facility] pulm last admission   - f/u pulm recs Known recrudescent exudative pleural effusion, previous thoracentesis 07/22 by [facility] pulm last admission   - plan for thoracentesis today  - pulm following, appreciate recs

## 2022-09-11 NOTE — PATIENT PROFILE ADULT - FALL HARM RISK - HARM RISK INTERVENTIONS

## 2022-09-11 NOTE — PROGRESS NOTE ADULT - PROBLEM SELECTOR PLAN 9
I have reviewed discharge instructions with the patient. The patient verbalized understanding. Patient left ED via Discharge Method: ambulatory to Home with self. Opportunity for questions and clarification provided. F: s/p 2.5L nacl  E: replete PRN  N: puree  DVT: lovenox SubQ  CODE FULL.

## 2022-09-11 NOTE — PROGRESS NOTE ADULT - PROBLEM SELECTOR PLAN 4
Patient presented initially with BP of 150s, then downtrended to SBP 110s to 130s.  As per last DC note ,patient had been on lisinopril and amlodipine; however, patient unsure of medications. As per med rec done on 7/25, patient not on any hypertensives. Patient presented initially with BP of 150s, then downtrended to SBP 110s to 130s.  As per last DC note, patient had been on lisinopril and amlodipine; however, patient unsure of medications. As per med rec done on 7/25, patient not on any hypertensives.  - normotensive without antihypertensives at this time

## 2022-09-11 NOTE — PROVIDER CONTACT NOTE (OTHER) - SITUATION
pt was getting more agitated and anxious , unable to redirect, vomited 5 times over this shift, also pt is retaining , no urine out but 30ml pt was getting more agitated and anxious , unable to redirect, vomited 5 times over this shift, also pt is retaining , no urine out but 30ml, 81/53, HR 73, RR24, abdominal breathing, oxygen 93 on 4L

## 2022-09-12 NOTE — DISCHARGE NOTE FOR THE EXPIRED PATIENT - HOSPITAL COURSE
Mr. Conner Gonzales is a 89 year old male with a past medical history of stomach and colon cancer (recent diagnosis at [facility], follows with Dr. Bolanos), ALCON, DM, HTN, HLD, history of DVT treated with Eliquis, claudication s/p RLE SFA stent, BPH who presented to the hospital for 1 day of cough and chills. On admission the patient was noted to be DNR/DNI with no goal for escalation of oxygen support beyond nasal canula. In the ED the patient was found to be febrile, tachycardic, with an elevated lactate meeting sepsis criteria. A CT scan of the chest/abdomen/pelvis noted a large right and small left pleural effusion, moderate ascites, numerous hepatic lesions, and mural thickening in the stomach, and cecal mass in the colon. The patient was initially treated with fluid resuscitation of 2.5 L in the ED and empiric antibiotics of Vancomycin and Zosyn. He was then admitted for further management of sepsis thought to be due to pneumonia. The patient was seen by pulmonology who performed a right sided thoracentesis with 2L of serous drainage. The patient was initially normotensive, saturating well on 2L via nasal canula during this hospitalization but was noted to develop worsening hypotension and tachypnea, increased abdominal distention with vomiting. A straight catheterization was performed with minimal output and abdominal X-ray with no evidence for obstruction. Surgery consulted for recommendation of NG tube with suction, this option was discussed with the family who then decided against further escalation of care. The patient was noted to be apneic at 1:23 am on 9/12/2022, time of death was pronounced at 1:36 am on 9/12/2022. Mr. Conner Gonzales is a 89 year old male with a past medical history of stomach and colon cancer (recent diagnosis at [facility], follows with Dr. Bolanos), ALCON, DM, HTN, HLD, history of DVT treated with Eliquis, claudication s/p RLE SFA stent, BPH who presented to the hospital for 1 day of cough and chills. On admission the patient was noted to be DNR/DNI with no goal for escalation of oxygen support beyond nasal canula. In the ED the patient was found to be febrile, tachycardic, with an elevated lactate meeting sepsis criteria. A CT scan of the chest/abdomen/pelvis noted a large right and small left pleural effusion, moderate ascites, numerous hepatic lesions, and mural thickening in the stomach, and cecal mass in the colon. The patient was initially treated with fluid resuscitation of 2.5 L in the ED and empiric antibiotics of Vancomycin and Zosyn. He was then admitted for further management of sepsis thought to be due to pneumonia. The patient was seen by pulmonology who performed a right sided thoracentesis with 2L of serous drainage. The patient was initially normotensive, saturating well on 2L via nasal canula during this hospitalization but was noted to develop worsening hypotension and tachypnea, increased abdominal distention with vomiting. A straight catheterization was performed with minimal output and abdominal X-ray with no evidence for obstruction. Surgery consulted for recommendation of NG tube with suction, this option was discussed with the family who then decided against further escalation of care. The patient was noted to be apneic at 1:23 am on 9/12/2022. Called bedside for apnea absent pulses. On physical exam patient had no pulse, no respirations, absent breath sounds and heart sounds on auscultation, absent corneal reflex, negative dolls eyes reflex, and pupils mid dilated and fixed. Time of death was pronounced at 1:36 am on 9/12/2022.

## 2022-09-12 NOTE — DISCHARGE NOTE FOR THE EXPIRED PATIENT - FINDINGS/TREATMENT
Pulmonology performed a thoracentesis on the right side under ultrasound guidance on 9/11 with 2L of straw-colored fluid drained.

## 2022-09-12 NOTE — DISCHARGE NOTE FOR THE EXPIRED PATIENT - SECONDARY DIAGNOSIS.
Cancer of stomach DM (diabetes mellitus) Pneumonia Stage 3 chronic kidney disease Hypertension HLD (hyperlipidemia) Pleural effusion

## 2022-09-26 DIAGNOSIS — C16.9 MALIGNANT NEOPLASM OF STOMACH, UNSPECIFIED: ICD-10-CM

## 2022-09-26 DIAGNOSIS — A41.9 SEPSIS, UNSPECIFIED ORGANISM: ICD-10-CM

## 2022-09-26 DIAGNOSIS — D50.9 IRON DEFICIENCY ANEMIA, UNSPECIFIED: ICD-10-CM

## 2022-09-26 DIAGNOSIS — E11.51 TYPE 2 DIABETES MELLITUS WITH DIABETIC PERIPHERAL ANGIOPATHY WITHOUT GANGRENE: ICD-10-CM

## 2022-09-26 DIAGNOSIS — E78.5 HYPERLIPIDEMIA, UNSPECIFIED: ICD-10-CM

## 2022-09-26 DIAGNOSIS — C18.9 MALIGNANT NEOPLASM OF COLON, UNSPECIFIED: ICD-10-CM

## 2022-09-26 DIAGNOSIS — I70.211 ATHEROSCLEROSIS OF NATIVE ARTERIES OF EXTREMITIES WITH INTERMITTENT CLAUDICATION, RIGHT LEG: ICD-10-CM

## 2022-09-26 DIAGNOSIS — E11.22 TYPE 2 DIABETES MELLITUS WITH DIABETIC CHRONIC KIDNEY DISEASE: ICD-10-CM

## 2022-09-26 DIAGNOSIS — Z95.828 PRESENCE OF OTHER VASCULAR IMPLANTS AND GRAFTS: ICD-10-CM

## 2022-09-26 DIAGNOSIS — R18.8 OTHER ASCITES: ICD-10-CM

## 2022-09-26 DIAGNOSIS — Z51.5 ENCOUNTER FOR PALLIATIVE CARE: ICD-10-CM

## 2022-09-26 DIAGNOSIS — Z66 DO NOT RESUSCITATE: ICD-10-CM

## 2022-09-26 DIAGNOSIS — R65.20 SEVERE SEPSIS WITHOUT SEPTIC SHOCK: ICD-10-CM

## 2022-09-26 DIAGNOSIS — J15.9 UNSPECIFIED BACTERIAL PNEUMONIA: ICD-10-CM

## 2022-09-26 DIAGNOSIS — J91.0 MALIGNANT PLEURAL EFFUSION: ICD-10-CM

## 2022-09-26 DIAGNOSIS — E86.0 DEHYDRATION: ICD-10-CM

## 2022-09-26 DIAGNOSIS — N18.30 CHRONIC KIDNEY DISEASE, STAGE 3 UNSPECIFIED: ICD-10-CM

## 2022-09-26 DIAGNOSIS — Z86.718 PERSONAL HISTORY OF OTHER VENOUS THROMBOSIS AND EMBOLISM: ICD-10-CM

## 2022-09-26 DIAGNOSIS — I12.9 HYPERTENSIVE CHRONIC KIDNEY DISEASE WITH STAGE 1 THROUGH STAGE 4 CHRONIC KIDNEY DISEASE, OR UNSPECIFIED CHRONIC KIDNEY DISEASE: ICD-10-CM

## 2022-09-26 DIAGNOSIS — N40.0 BENIGN PROSTATIC HYPERPLASIA WITHOUT LOWER URINARY TRACT SYMPTOMS: ICD-10-CM

## 2022-12-30 NOTE — ED ADULT NURSE NOTE - ED CARDIAC HEART SOUNDS
[FreeTextEntry1] : Now on ferrous sulfate 325 mg TID\par \par Pt labs on his phone from PCP 12/2022.\par \par T2DM\par New to our office after cardiac surgery Summer 2022\par Severity: moderate control\par Onset: routine labs\par Duration: approx 8 years\par Modifying Factors: only on insulin post hospital discharge \par Previously controlled by PCP\par \par SMBG\par Checks BS 4x a day\par All blood sugars 90s and 100s \par \par FS in office 117\par \par HGA1C 5.5- 12/2022\par \par Current drug regimen\par Farxiga 5 mg daily\par  mg BID\par \par Eye exam: last eye exam: Spring 2022- denies DR\par Foot exam: wife takes care of feet\par Kidney disease: denies \par Heart disease: follows with cardiology \par \par Weight: lost weight when leaving the hospital because he went very low carb, has since added carbs back and is still doing well\par Diet: wife cooks, less carbs and fried foods \par B: whole grain asain cereal with berries- 3 egg whites \par L: soup, apple, salad\par D: sweet potato, veggies, turkey\par S: not snacking\par Drinks: water \par Exercise: goes for walks when he can \par Smoking : denies \par \par HLD\par Lipid panel at goal \par Atorvastatin 80 mg daily- per cardiology\par \par HTN\par Bp in office 132/82\par Metorpolol 200 mg in PM\par Enalparil 5 mg BID\par Hydralazine 25 mg TID
normal S1, S2 heard

## 2023-02-03 NOTE — ED ADULT NURSE NOTE - ED CARDIAC RATE
01-Feb-2023 10:02 28-Jan-2023 13:07 29-Jan-2023 11:59 31-Jan-2023 10:15 03-Feb-2023 17:05 03-Feb-2023 02:00 tachycardic

## 2023-03-07 NOTE — ED PROVIDER NOTE - ADMIT DISPOSITION PRESENT ON ADMISSION SEPSIS
Pt scripts x1 instructed to follow up with PCP. Assessed per Eliel Lopez NP.      Melissa Cervantes, KISHOREN  [birth year]05 [id number] Yes

## 2023-08-14 NOTE — ED ADULT TRIAGE NOTE - HEART RATE (BEATS/MIN)
behavioral changes, confusion, headaches or seizures. Negative for weakness and numbness. Dermatological ROS: negative for - mole changes, rash  Cardiovascular: Negative for leg swelling. Gastrointestinal: Negative for constipation, diarrhea, nausea and vomiting. Lower Extremity Physical Examination:   Vitals: There were no vitals filed for this visit. General: AAO x 3 in NAD. Dermatologic Exam:  Skin lesion/ulceration Absent . Skin No rashes or nodules noted. .       Musculoskeletal:     1st MPJ ROM decreased, Bilateral.  Muscle strength 5/5, Bilateral.  Pain present upon palpation of left foot metatarsal bases 2-4 left . Medial longitudinal arch, Bilateral WNL. Ankle ROM WNL,Bilateral.    Dorsally contracted digits absent digits 1-5 Bilateral.     Vascular: DP and PT pulses palpable 2/4, Bilateral.  CFT <3 seconds, Bilateral.  Hair growth present to the level of the digits, Bilateral.  Edema absent, Bilateral.  Varicosities absent, Bilateral. Erythema absent, Bilateral    Neurological: Sensation intact to light touch to level of digits, Bilateral.  Protective sensation intact 10/10 sites via 5.07/10g [place]-Vasyl Monofilament, Bilateral.  negative Tinel's, Bilateral.  negative Valleix sign, Bilateral.      Integument: Warm, dry, supple, Bilateral.  Open lesion absent, Bilateral.  Interdigital maceration absent to web spaces 1-4, Bilateral.  Nails are normal in length, thickness and color 1-5 bilateral.  Fissures absent, Bilateral.       Radiographs:  3 views left foot xrays:  nondisplaced fractures of metatsaral bases 2-4 with soft tissue edema present.  :     Asessment: Patient is a 39 y.o. female with:    Diagnosis Orders   1. Left foot pain  XR FOOT LEFT (MIN 3 VIEWS)    ketorolac (TORADOL) 10 MG tablet    HYDROcodone-acetaminophen (NORCO) 5-325 MG per tablet            Plan: Patient examined and evaluated. Current condition and treatment options discussed in detail.   Discussed 89

## 2023-09-20 NOTE — ED PROVIDER NOTE - CADM POA PRESS ULCER
Overnight: Gas pains. Simethicone x 1.     SUBJECTIVE: Patient seen and examined on AM rounds. Patient reports feeling better. Passed TOV yesterday. Tolerating LRD. Ambulating well. + flatus. + BM. Denies fevers, chills, SOB, CP.     Vital Signs Last 24 Hrs  T(C): 36.5 (19 Sep 2023 20:54), Max: 36.9 (19 Sep 2023 00:44)  T(F): 97.7 (19 Sep 2023 20:54), Max: 98.5 (19 Sep 2023 05:45)  HR: 79 (19 Sep 2023 20:54) (71 - 82)  BP: 133/72 (19 Sep 2023 20:54) (110/56 - 139/63)  BP(mean): --  RR: 18 (19 Sep 2023 20:54) (18 - 18)  SpO2: 98% (19 Sep 2023 20:54) (97% - 99%)    Parameters below as of 19 Sep 2023 20:54  Patient On (Oxygen Delivery Method): room air        I&O's Detail    18 Sep 2023 07:01  -  19 Sep 2023 07:00  --------------------------------------------------------  IN:    IV PiggyBack: 300 mL    Lactated Ringers: 120 mL    Oral Fluid: 500 mL  Total IN: 920 mL    OUT:    Indwelling Catheter - Urethral (mL): 1145 mL  Total OUT: 1145 mL    Total NET: -225 mL      19 Sep 2023 07:01  -  20 Sep 2023 00:29  --------------------------------------------------------  IN:    Lactated Ringers: 480 mL    Oral Fluid: 200 mL  Total IN: 680 mL    OUT:    Indwelling Catheter - Urethral (mL): 100 mL    Voided (mL): 300 mL  Total OUT: 400 mL    Total NET: 280 mL        Physical Exam:  General Appearance: Appears well, NAD  Respiratory: No acute resp distress, no accesory muscle use  Abdomen: soft, nontender, nondistended, single midline incision covered with gauze with moderate s/s strikethrough  Extremities: Grossly symmetric, SCD's in place       LABS:                        11.5   11.75 )-----------( 497      ( 19 Sep 2023 12:40 )             36.4     09-19    137  |  104  |  11  ----------------------------<  99  4.0   |  20<L>  |  0.72    Ca    8.3<L>      19 Sep 2023 06:31  Phos  3.7     09-19  Mg     2.4     09-19      PT/INR - ( 18 Sep 2023 10:57 )   PT: 11.2 sec;   INR: 1.07 ratio         PTT - ( 18 Sep 2023 10:57 )  PTT:28.9 sec  Urinalysis Basic - ( 19 Sep 2023 06:31 )    Color: x / Appearance: x / SG: x / pH: x  Gluc: 99 mg/dL / Ketone: x  / Bili: x / Urobili: x   Blood: x / Protein: x / Nitrite: x   Leuk Esterase: x / RBC: x / WBC x   Sq Epi: x / Non Sq Epi: x / Bacteria: x        RADIOLOGY & ADDITIONAL STUDIES:   Overnight: Gas pains. Simethicone x 1.     SUBJECTIVE: Patient seen and examined on AM rounds. Patient reports feeling better. pain well controlled.  Passed TOV yesterday. Tolerating LRD. Ambulating well. + flatus. + BM. Stools loose and nonbloody. Denies fevers, chills, SOB, CP.     Vital Signs Last 24 Hrs  T(C): 36.5 (19 Sep 2023 20:54), Max: 36.9 (19 Sep 2023 00:44)  T(F): 97.7 (19 Sep 2023 20:54), Max: 98.5 (19 Sep 2023 05:45)  HR: 79 (19 Sep 2023 20:54) (71 - 82)  BP: 133/72 (19 Sep 2023 20:54) (110/56 - 139/63)  BP(mean): --  RR: 18 (19 Sep 2023 20:54) (18 - 18)  SpO2: 98% (19 Sep 2023 20:54) (97% - 99%)    Parameters below as of 19 Sep 2023 20:54  Patient On (Oxygen Delivery Method): room air        I&O's Detail    18 Sep 2023 07:01  -  19 Sep 2023 07:00  --------------------------------------------------------  IN:    IV PiggyBack: 300 mL    Lactated Ringers: 120 mL    Oral Fluid: 500 mL  Total IN: 920 mL    OUT:    Indwelling Catheter - Urethral (mL): 1145 mL  Total OUT: 1145 mL    Total NET: -225 mL      19 Sep 2023 07:01  -  20 Sep 2023 00:29  --------------------------------------------------------  IN:    Lactated Ringers: 480 mL    Oral Fluid: 200 mL  Total IN: 680 mL    OUT:    Indwelling Catheter - Urethral (mL): 100 mL    Voided (mL): 300 mL  Total OUT: 400 mL    Total NET: 280 mL        Physical Exam:  General Appearance: Appears well, NAD  Respiratory: No acute resp distress, no accesory muscle use  Abdomen: soft, minimally tender, nondistended, single midline incision covered with gauze c/d/i. Penrose taken out on rounds.   Extremities: Grossly symmetric, SCD's in place       LABS:                        11.5   11.75 )-----------( 497      ( 19 Sep 2023 12:40 )             36.4     09-19    137  |  104  |  11  ----------------------------<  99  4.0   |  20<L>  |  0.72    Ca    8.3<L>      19 Sep 2023 06:31  Phos  3.7     09-19  Mg     2.4     09-19      PT/INR - ( 18 Sep 2023 10:57 )   PT: 11.2 sec;   INR: 1.07 ratio         PTT - ( 18 Sep 2023 10:57 )  PTT:28.9 sec  Urinalysis Basic - ( 19 Sep 2023 06:31 )    Color: x / Appearance: x / SG: x / pH: x  Gluc: 99 mg/dL / Ketone: x  / Bili: x / Urobili: x   Blood: x / Protein: x / Nitrite: x   Leuk Esterase: x / RBC: x / WBC x   Sq Epi: x / Non Sq Epi: x / Bacteria: x        RADIOLOGY & ADDITIONAL STUDIES:   No

## 2023-11-07 NOTE — ED ADULT NURSE NOTE - EXTENSIONS OF SELF_ADULT
Detail Level: Zone Tetracycline Counseling: Patient counseled regarding possible photosensitivity and increased risk for sunburn.  Patient instructed to avoid sunlight, if possible.  When exposed to sunlight, patients should wear protective clothing, sunglasses, and sunscreen.  The patient was instructed to call the office immediately if the following severe adverse effects occur:  hearing changes, easy bruising/bleeding, severe headache, or vision changes.  The patient verbalized understanding of the proper use and possible adverse effects of tetracycline.  All of the patient's questions and concerns were addressed. Patient understands to avoid pregnancy while on therapy due to potential birth defects. Benzoyl Peroxide Counseling: Patient counseled that medicine may cause skin irritation and bleach clothing.  In the event of skin irritation, the patient was advised to reduce the amount of the drug applied or use it less frequently.   The patient verbalized understanding of the proper use and possible adverse effects of benzoyl peroxide.  All of the patient's questions and concerns were addressed. Spironolactone Counseling: Patient advised regarding risks of diarrhea, abdominal pain, hyperkalemia, birth defects (for female patients), liver toxicity and renal toxicity. The patient may need blood work to monitor liver and kidney function and potassium levels while on therapy. The patient verbalized understanding of the proper use and possible adverse effects of spironolactone.  All of the patient's questions and concerns were addressed. Aklief counseling:  Patient advised to apply a pea-sized amount only at bedtime and wait 30 minutes after washing their face before applying.  If too drying, patient may add a non-comedogenic moisturizer.  The most commonly reported side effects including irritation, redness, scaling, dryness, stinging, burning, itching, and increased risk of sunburn.  The patient verbalized understanding of the proper use and possible adverse effects of retinoids.  All of the patient's questions and concerns were addressed. None Sarecycline Counseling: Patient advised regarding possible photosensitivity and discoloration of the teeth, skin, lips, tongue and gums.  Patient instructed to avoid sunlight, if possible.  When exposed to sunlight, patients should wear protective clothing, sunglasses, and sunscreen.  The patient was instructed to call the office immediately if the following severe adverse effects occur:  hearing changes, easy bruising/bleeding, severe headache, or vision changes.  The patient verbalized understanding of the proper use and possible adverse effects of sarecycline.  All of the patient's questions and concerns were addressed. Azelaic Acid Counseling: Patient counseled that medicine may cause skin irritation and to avoid applying near the eyes.  In the event of skin irritation, the patient was advised to reduce the amount of the drug applied or use it less frequently.   The patient verbalized understanding of the proper use and possible adverse effects of azelaic acid.  All of the patient's questions and concerns were addressed. Use Enhanced Medication Counseling?: No Doxycycline Counseling:  Patient counseled regarding possible photosensitivity and increased risk for sunburn.  Patient instructed to avoid sunlight, if possible.  When exposed to sunlight, patients should wear protective clothing, sunglasses, and sunscreen.  The patient was instructed to call the office immediately if the following severe adverse effects occur:  hearing changes, easy bruising/bleeding, severe headache, or vision changes.  The patient verbalized understanding of the proper use and possible adverse effects of doxycycline.  All of the patient's questions and concerns were addressed. Detail Level: Detailed Azithromycin Counseling:  I discussed with the patient the risks of azithromycin including but not limited to GI upset, allergic reaction, drug rash, diarrhea, and yeast infections. Birth Control Pills Counseling: Birth Control Pill Counseling: I discussed with the patient the potential side effects of OCPs including but not limited to increased risk of stroke, heart attack, thrombophlebitis, deep venous thrombosis, hepatic adenomas, breast changes, GI upset, headaches, and depression.  The patient verbalized understanding of the proper use and possible adverse effects of OCPs. All of the patient's questions and concerns were addressed. Bactrim Counseling:  I discussed with the patient the risks of sulfa antibiotics including but not limited to GI upset, allergic reaction, drug rash, diarrhea, dizziness, photosensitivity, and yeast infections.  Rarely, more serious reactions can occur including but not limited to aplastic anemia, agranulocytosis, methemoglobinemia, blood dyscrasias, liver or kidney failure, lung infiltrates or desquamative/blistering drug rashes. Dapsone Counseling: I discussed with the patient the risks of dapsone including but not limited to hemolytic anemia, agranulocytosis, rashes, methemoglobinemia, kidney failure, peripheral neuropathy, headaches, GI upset, and liver toxicity.  Patients who start dapsone require monitoring including baseline LFTs and weekly CBCs for the first month, then every month thereafter.  The patient verbalized understanding of the proper use and possible adverse effects of dapsone.  All of the patient's questions and concerns were addressed. Tazorac Counseling:  Patient advised that medication is irritating and drying.  Patient may need to apply sparingly and wash off after an hour before eventually leaving it on overnight.  The patient verbalized understanding of the proper use and possible adverse effects of tazorac.  All of the patient's questions and concerns were addressed. High Dose Vitamin A Counseling: Side effects reviewed, pt to contact office should one occur. Topical Clindamycin Counseling: Patient counseled that this medication may cause skin irritation or allergic reactions.  In the event of skin irritation, the patient was advised to reduce the amount of the drug applied or use it less frequently.   The patient verbalized understanding of the proper use and possible adverse effects of clindamycin.  All of the patient's questions and concerns were addressed. Topical Retinoid counseling:  Patient advised to apply a pea-sized amount only at bedtime and wait 30 minutes after washing their face before applying.  If too drying, patient may add a non-comedogenic moisturizer. The patient verbalized understanding of the proper use and possible adverse effects of retinoids.  All of the patient's questions and concerns were addressed. Minocycline Counseling: Patient advised regarding possible photosensitivity and discoloration of the teeth, skin, lips, tongue and gums.  Patient instructed to avoid sunlight, if possible.  When exposed to sunlight, patients should wear protective clothing, sunglasses, and sunscreen.  The patient was instructed to call the office immediately if the following severe adverse effects occur:  hearing changes, easy bruising/bleeding, severe headache, or vision changes.  The patient verbalized understanding of the proper use and possible adverse effects of minocycline.  All of the patient's questions and concerns were addressed. Winlevi Counseling:  I discussed with the patient the risks of topical clascoterone including but not limited to erythema, scaling, itching, and stinging. Patient voiced their understanding. Erythromycin Counseling:  I discussed with the patient the risks of erythromycin including but not limited to GI upset, allergic reaction, drug rash, diarrhea, increase in liver enzymes, and yeast infections. Topical Sulfur Applications Counseling: Topical Sulfur Counseling: Patient counseled that this medication may cause skin irritation or allergic reactions.  In the event of skin irritation, the patient was advised to reduce the amount of the drug applied or use it less frequently.   The patient verbalized understanding of the proper use and possible adverse effects of topical sulfur application.  All of the patient's questions and concerns were addressed. Isotretinoin Counseling: Patient should get monthly blood tests, not donate blood, not drive at night if vision affected, not share medication, and not undergo elective surgery for 6 months after tx completed. Side effects reviewed, pt to contact office should one occur. Azelaic Acid Pregnancy And Lactation Text: This medication is considered safe during pregnancy and breast feeding. Sarecycline Pregnancy And Lactation Text: This medication is Pregnancy Category D and not consider safe during pregnancy. It is also excreted in breast milk. Aklief Pregnancy And Lactation Text: It is unknown if this medication is safe to use during pregnancy.  It is unknown if this medication is excreted in breast milk.  Breastfeeding women should use the topical cream on the smallest area of the skin for the shortest time needed while breastfeeding.  Do not apply to nipple and areola. Dapsone Pregnancy And Lactation Text: This medication is Pregnancy Category C and is not considered safe during pregnancy or breast feeding. Bactrim Pregnancy And Lactation Text: This medication is Pregnancy Category D and is known to cause fetal risk.  It is also excreted in breast milk. Doxycycline Pregnancy And Lactation Text: This medication is Pregnancy Category D and not consider safe during pregnancy. It is also excreted in breast milk but is considered safe for shorter treatment courses. Birth Control Pills Pregnancy And Lactation Text: This medication should be avoided if pregnant and for the first 30 days post-partum. Azithromycin Pregnancy And Lactation Text: This medication is considered safe during pregnancy and is also secreted in breast milk. Tazorac Pregnancy And Lactation Text: This medication is not safe during pregnancy. It is unknown if this medication is excreted in breast milk. Spironolactone Pregnancy And Lactation Text: This medication can cause feminization of the male fetus and should be avoided during pregnancy. The active metabolite is also found in breast milk. Benzoyl Peroxide Pregnancy And Lactation Text: This medication is Pregnancy Category C. It is unknown if benzoyl peroxide is excreted in breast milk. Winlevi Pregnancy And Lactation Text: This medication is considered safe during pregnancy and breastfeeding. Topical Retinoid Pregnancy And Lactation Text: This medication is Pregnancy Category C. It is unknown if this medication is excreted in breast milk. High Dose Vitamin A Pregnancy And Lactation Text: High dose vitamin A therapy is contraindicated during pregnancy and breast feeding. Topical Sulfur Applications Pregnancy And Lactation Text: This medication is Pregnancy Category C and has an unknown safety profile during pregnancy. It is unknown if this topical medication is excreted in breast milk. Erythromycin Pregnancy And Lactation Text: This medication is Pregnancy Category B and is considered safe during pregnancy. It is also excreted in breast milk. Topical Clindamycin Pregnancy And Lactation Text: This medication is Pregnancy Category B and is considered safe during pregnancy. It is unknown if it is excreted in breast milk. Isotretinoin Pregnancy And Lactation Text: This medication is Pregnancy Category X and is considered extremely dangerous during pregnancy. It is unknown if it is excreted in breast milk.
